# Patient Record
Sex: FEMALE | Race: BLACK OR AFRICAN AMERICAN | NOT HISPANIC OR LATINO | Employment: UNEMPLOYED | URBAN - METROPOLITAN AREA
[De-identification: names, ages, dates, MRNs, and addresses within clinical notes are randomized per-mention and may not be internally consistent; named-entity substitution may affect disease eponyms.]

---

## 2020-09-30 ENCOUNTER — TELEPHONE (OUTPATIENT)
Dept: RHEUMATOLOGY | Facility: CLINIC | Age: 45
End: 2020-09-30

## 2020-10-01 ENCOUNTER — OFFICE VISIT (OUTPATIENT)
Dept: RHEUMATOLOGY | Facility: CLINIC | Age: 45
End: 2020-10-01
Payer: MEDICAID

## 2020-10-01 ENCOUNTER — TELEPHONE (OUTPATIENT)
Dept: RHEUMATOLOGY | Facility: CLINIC | Age: 45
End: 2020-10-01

## 2020-10-01 ENCOUNTER — HOSPITAL ENCOUNTER (OUTPATIENT)
Dept: RADIOLOGY | Facility: HOSPITAL | Age: 45
Discharge: HOME | End: 2020-10-01
Attending: INTERNAL MEDICINE
Payer: MEDICAID

## 2020-10-01 VITALS
WEIGHT: 192.69 LBS | DIASTOLIC BLOOD PRESSURE: 102 MMHG | BODY MASS INDEX: 35.46 KG/M2 | HEART RATE: 71 BPM | SYSTOLIC BLOOD PRESSURE: 150 MMHG | HEIGHT: 62 IN

## 2020-10-01 DIAGNOSIS — M06.4 INFLAMMATORY POLYARTHRITIS: ICD-10-CM

## 2020-10-01 DIAGNOSIS — M06.4 INFLAMMATORY POLYARTHRITIS: Primary | ICD-10-CM

## 2020-10-01 PROCEDURE — 99205 OFFICE O/P NEW HI 60 MIN: CPT | Mod: S$PBB,,, | Performed by: INTERNAL MEDICINE

## 2020-10-01 PROCEDURE — 99999 PR PBB SHADOW E&M-EST. PATIENT-LVL IV: ICD-10-PCS | Mod: PBBFAC,,, | Performed by: INTERNAL MEDICINE

## 2020-10-01 PROCEDURE — 77077 JOINT SURVEY SINGLE VIEW: CPT | Mod: TC

## 2020-10-01 PROCEDURE — 99999 PR PBB SHADOW E&M-EST. PATIENT-LVL IV: CPT | Mod: PBBFAC,,, | Performed by: INTERNAL MEDICINE

## 2020-10-01 PROCEDURE — 99214 OFFICE O/P EST MOD 30 MIN: CPT | Mod: PBBFAC | Performed by: INTERNAL MEDICINE

## 2020-10-01 PROCEDURE — 77077 XR ARTHRITIS SURVEY: ICD-10-PCS | Mod: 26,,, | Performed by: RADIOLOGY

## 2020-10-01 PROCEDURE — 77077 JOINT SURVEY SINGLE VIEW: CPT | Mod: 26,,, | Performed by: RADIOLOGY

## 2020-10-01 PROCEDURE — 99205 PR OFFICE/OUTPT VISIT, NEW, LEVL V, 60-74 MIN: ICD-10-PCS | Mod: S$PBB,,, | Performed by: INTERNAL MEDICINE

## 2020-10-01 RX ORDER — HYDRALAZINE HYDROCHLORIDE 100 MG/1
TABLET, FILM COATED ORAL
COMMUNITY
Start: 2020-09-21 | End: 2021-03-30

## 2020-10-01 RX ORDER — TIZANIDINE 4 MG/1
TABLET ORAL
COMMUNITY
Start: 2020-09-28 | End: 2022-11-10 | Stop reason: ALTCHOICE

## 2020-10-01 RX ORDER — CARVEDILOL 25 MG/1
25 TABLET ORAL 2 TIMES DAILY
COMMUNITY
Start: 2020-09-05 | End: 2022-10-12

## 2020-10-01 RX ORDER — ESCITALOPRAM OXALATE 20 MG/1
TABLET ORAL
COMMUNITY
Start: 2020-08-25 | End: 2022-10-12

## 2020-10-01 RX ORDER — CLONAZEPAM 1 MG/1
TABLET ORAL
COMMUNITY
Start: 2020-08-18

## 2020-10-01 RX ORDER — IBUPROFEN 800 MG/1
TABLET ORAL
COMMUNITY
Start: 2020-09-28 | End: 2020-10-28 | Stop reason: ALTCHOICE

## 2020-10-01 RX ORDER — CHLORHEXIDINE GLUCONATE ORAL RINSE 1.2 MG/ML
SOLUTION DENTAL
COMMUNITY
Start: 2020-09-27 | End: 2021-03-30

## 2020-10-01 RX ORDER — NALOXONE HYDROCHLORIDE 4 MG/.1ML
SPRAY NASAL
COMMUNITY
Start: 2020-08-25 | End: 2021-10-05 | Stop reason: ALTCHOICE

## 2020-10-01 RX ORDER — PREDNISONE 20 MG/1
20 TABLET ORAL DAILY
Qty: 30 TABLET | Refills: 2 | Status: SHIPPED | OUTPATIENT
Start: 2020-10-01 | End: 2020-10-28 | Stop reason: ALTCHOICE

## 2020-10-01 RX ORDER — DICLOFENAC SODIUM 10 MG/G
GEL TOPICAL
COMMUNITY
Start: 2020-06-26 | End: 2021-10-05 | Stop reason: ALTCHOICE

## 2020-10-01 RX ORDER — ASPIRIN 325 MG
TABLET ORAL
COMMUNITY
Start: 2020-09-30

## 2020-10-01 RX ORDER — BUSPIRONE HYDROCHLORIDE 10 MG/1
TABLET ORAL
COMMUNITY
Start: 2020-09-15 | End: 2021-03-30

## 2020-10-01 RX ORDER — LOSARTAN POTASSIUM 100 MG/1
TABLET ORAL
COMMUNITY
Start: 2020-07-06 | End: 2021-03-30

## 2020-10-01 RX ORDER — GABAPENTIN 300 MG/1
CAPSULE ORAL
COMMUNITY
Start: 2020-09-28 | End: 2021-03-30

## 2020-10-01 RX ORDER — CLONIDINE HYDROCHLORIDE 0.2 MG/1
0.4 TABLET ORAL 2 TIMES DAILY
COMMUNITY
Start: 2020-09-10 | End: 2021-03-30

## 2020-10-01 NOTE — PROGRESS NOTES
RHEUMATOLOGY CLINIC INITIAL VISIT    Reason for referral:-  Referred for evaluation of joint pain.    Chief complaints:-  Joint pain.    HPI:-  [facility] 45 y[facility] pleasant female comes in for an initial visit with above chief complaints.  She was at her usual health until 3 months ago when she started having acute onset diffuse joint pain all over the body.  It came out of nowhere without any preceding systemic illness.  No exposure to COVID.  No Raynaud's phenomenon.  No skin rash.  No sicca syndrome.  No personal or family history of skin psoriasis.  No family history of lupus.  The pain has been progressively worsening associated with stiffness and swelling lasting all day long especially in her small joints.  She was checked for lupus and was found to have some lupus antibody.  No further information available on that.  She was given 3 days of prednisone which helped with the pain and the pain returned.  She has been doing physical therapy which has only worsened the pain.  It is not improving her pain.    Review of Systems   Constitutional: Negative for chills and fever.   HENT: Negative for congestion and sore throat.    Eyes: Negative for blurred vision and redness.   Respiratory: Negative for cough and shortness of breath.    Cardiovascular: Negative for chest pain and leg swelling.   Gastrointestinal: Negative for abdominal pain.   Genitourinary: Negative for dysuria.   Musculoskeletal: Positive for joint pain. Negative for back pain, falls, myalgias and neck pain.   Skin: Negative for rash.   Neurological: Negative for headaches.   Endo/Heme/Allergies: Does not bruise/bleed easily.   Psychiatric/Behavioral: Negative for memory loss. The patient does not have insomnia.        Past Medical History:   Diagnosis Date    Hypertension     Stroke            Past Surgical History:   Procedure Laterality Date     SECTION      TUBAL LIGATION          Social History     Tobacco Use     "Smoking status: Never Smoker    Smokeless tobacco: Never Used   Substance Use Topics    Alcohol use: Not Currently    Drug use: Not on file       Family History   Problem Relation Age of Onset    Hypertension Mother     Cancer Father         osiel       Review of patient's allergies indicates:   Allergen Reactions    Oranges [orange] Hives       Vitals:    10/01/20 1029   BP: (!) 150/102   Pulse: 71   Weight: 87.4 kg (192 lb 10.9 oz)   Height: 5' 2" (1.575 m)   PainSc:   9   PainLoc: Generalized       Physical Exam   Constitutional: She is oriented to person, place, and time and well-developed, well-nourished, and in no distress. No distress.   HENT:   Head: Normocephalic.   Mouth/Throat: Oropharynx is clear and moist.   Eyes: Pupils are equal, round, and reactive to light. Conjunctivae and EOM are normal.   Neck: Normal range of motion. Neck supple.   Cardiovascular: Normal rate and intact distal pulses.   Pulmonary/Chest: Effort normal. No respiratory distress.   Abdominal: Soft. There is no abdominal tenderness.   Musculoskeletal:      Comments: Severe synovitis over bilateral PIP is, MCPs, tenderness over bilateral wrists, tenderness around bilateral knees, fullness of bilateral ankles with effusion and positive MTP squeeze test.   Neurological: She is alert and oriented to person, place, and time. No cranial nerve deficit.   Skin: Skin is warm. No rash noted. No erythema.   Psychiatric: Mood and affect normal.   Nursing note and vitals reviewed.        Medication List with Changes/Refills   New Medications    PREDNISONE (DELTASONE) 20 MG TABLET    Take 1 tablet (20 mg total) by mouth once daily.   Current Medications    ASPIRIN 325 MG TABLET        BUSPIRONE (BUSPAR) 10 MG TABLET    TAKE 1 TO 2 TABLETS BY MOUTH ONCE DAILY    CARVEDILOL (COREG) 25 MG TABLET    Take 25 mg by mouth 2 (two) times daily.    CHLORHEXIDINE (PERIDEX) 0.12 % SOLUTION    SWISH AND SPIT 15 ML IN MOUTH TWICE A DAY (NO FOOD 60 " MINUTES AFTER)    CLONAZEPAM (KLONOPIN) 1 MG TABLET    TAKE 1 TABLET BY MOUTH ONCE DAILY AT BEDTIME AS NEEDED FOR ANXIETY SLEEP    CLONIDINE (CATAPRES) 0.2 MG TABLET    Take 0.4 mg by mouth 2 (two) times daily.    DICLOFENAC SODIUM (VOLTAREN) 1 % GEL    AS DIRECTED APPLY APPROXIMATELY 4 GRAMS TO PAINFUL JOINTS TWO TIMES A DAY IF NEEDED FOR 30 DAYS    ESCITALOPRAM OXALATE (LEXAPRO) 20 MG TABLET    TAKE 1 TABLET BY MOUTH ONCE DAILY FOR 30 DAYS    GABAPENTIN (NEURONTIN) 300 MG CAPSULE    TAKE 1 CAPSULE BY MOUTH TWICE DAILY FOR PAIN    HYDRALAZINE (APRESOLINE) 100 MG TABLET    TAKE 1 TABLET BY MOUTH THREE TIMES DAILY FOR 30 DAYS    IBUPROFEN (ADVIL,MOTRIN) 800 MG TABLET    TAKE 1 TABLET BY MOUTH EVERY 6 TO 8 HOURS FOR PAIN    LOSARTAN (COZAAR) 100 MG TABLET    TAKE 1 TABLET BY MOUTH ONCE DAILY FOR 30 DAYS    NARCAN 4 MG/ACTUATION SPRY    CALL 911. ADMINISTER A SINGLE SPRAY INTRANASALLY INTO ONE NOSTRIL UPON SIGNS OF OPIOID OVERDOSE. MAY REPEAT AFTER 3 MINUTES IF NO RESPONSE.    TIZANIDINE (ZANAFLEX) 4 MG TABLET    TAKE 1 TABLET BY MOUTH AS NEEDED THREE TIMES A DAY IF NEEDED FOR PAIN       Assessment/Plans:-  1. Inflammatory polyarthritis    ·  Acute onset chronic inflammatory symmetric polyarthritis.  Differential diagnosis includes rheumatoid arthritis, lupus arthritis, psoriatic arthritis and other connective tissue disease arthritis.  Check labs today to confirm what kind of arthritis she has.  Start prednisone in the interim.  Check x-ray to look for any damage  Problem List Items Addressed This Visit     Inflammatory polyarthritis - Primary    Relevant Medications    predniSONE (DELTASONE) 20 MG tablet    Other Relevant Orders    ED Screen w/Reflex    CBC auto differential    Cyclic Citrullinated Peptide Antibody, IgG    Comprehensive metabolic panel    C-Reactive Protein    Sedimentation rate    Rheumatoid factor    HLA B27 Antigen    Parvovirus B19 Antibody, IgG and IgM    XR Arthritis Survey          Follow up  in about 3 weeks (around 10/22/2020).    Thank you for allowing me to participate in the [facility].    Disclaimer: This note was prepared using voice recognition system and is likely to have sound alike errors and is not proof read.  Please call me with any questions.

## 2020-10-01 NOTE — TELEPHONE ENCOUNTER
Spoke with patient she states that her Potassium is always low. She is currently taking Potassium 20meq twice daily and states that she has been faithfully taking it.    She will contact her PCP Xena Miller [phone] for recommendations

## 2020-10-05 ENCOUNTER — TELEPHONE (OUTPATIENT)
Dept: RHEUMATOLOGY | Facility: CLINIC | Age: 45
End: 2020-10-05

## 2020-10-05 NOTE — TELEPHONE ENCOUNTER
----- Message from Chase Edwards sent at 10/5/2020  9:42 AM CDT -----  Type:  Test Results    Who Called: Pt  Name of Test (Lab/Mammo/Etc): lab   Date of Test:   Ordering Provider: Dr. LÓPEZ   Where the test was performed:   Would the patient rather a call back or a response via [facility]? Call back  Best Call Back Number: [phone] (home)   Additional Information:

## 2020-10-05 NOTE — TELEPHONE ENCOUNTER
Spoke with pt and she was call for her lab results, please advise.     Pt also states that she is feeling a little better that at her visit.

## 2020-10-09 ENCOUNTER — TELEPHONE (OUTPATIENT)
Dept: RHEUMATOLOGY | Facility: CLINIC | Age: 45
End: 2020-10-09

## 2020-10-09 DIAGNOSIS — M06.4 INFLAMMATORY POLYARTHRITIS: Primary | ICD-10-CM

## 2020-10-09 NOTE — TELEPHONE ENCOUNTER
Spoke with pt and she states that she has been having pain the last few days. States that it moves around throughout her body and is a throbbing pain. Rates it at a 9 on a scale of 0 to 10. States that she couldn't go to therapy yesterday because of the pain and she couldn't sleep last night because of it. Has tried aleve, bc powder, ibuprofen, etc and nothing has helped. Advised pt that Dr. LÓPEZ is out of the clinic on Fridays but we could check with, come for a visit or do a virtual visit with another provider in clinic today and pt declined stating that she only wants to see Dr. LÓPEZ. Advised patient that he will be back in clinic on Monday and once he reviews her message we will get back with her. Pt verbalized understanding

## 2020-10-09 NOTE — TELEPHONE ENCOUNTER
----- Message from Jennifer Alberto sent at 10/9/2020  7:41 AM CDT -----  Contact: self  Type:  Needs Medical Advice    Who Called: pt  Symptoms (please be specific): pains through out body  How long has patient had these symptoms: 2 days  Pharmacy name and phone #:   [facility] [place], [place] - [address]  [address] [address]  Phone: [phone] Fax: [phone]  Would the patient rather a call back or a response via [facility]? Call back   Best Call Back Number: [phone]  Additional Information: none

## 2020-10-12 ENCOUNTER — LAB VISIT (OUTPATIENT)
Dept: LAB | Facility: HOSPITAL | Age: 45
End: 2020-10-12
Attending: INTERNAL MEDICINE
Payer: MEDICAID

## 2020-10-12 DIAGNOSIS — M06.4 INFLAMMATORY POLYARTHRITIS: ICD-10-CM

## 2020-10-12 LAB
ALBUMIN SERPL BCP-MCNC: 3.5 G/DL (ref 3.5–5.2)
ALP SERPL-CCNC: 70 U/L (ref 55–135)
ALT SERPL W/O P-5'-P-CCNC: 19 U/L (ref 10–44)
ANION GAP SERPL CALC-SCNC: 12 MMOL/L (ref 8–16)
ANISOCYTOSIS BLD QL SMEAR: SLIGHT
AST SERPL-CCNC: 13 U/L (ref 10–40)
BASOPHILS # BLD AUTO: 0.03 K/UL (ref 0–0.2)
BASOPHILS NFR BLD: 0.3 % (ref 0–1.9)
BILIRUB SERPL-MCNC: 0.3 MG/DL (ref 0.1–1)
BUN SERPL-MCNC: 19 MG/DL (ref 6–20)
CALCIUM SERPL-MCNC: 9.1 MG/DL (ref 8.7–10.5)
CHLORIDE SERPL-SCNC: 104 MMOL/L (ref 95–110)
CO2 SERPL-SCNC: 25 MMOL/L (ref 23–29)
CREAT SERPL-MCNC: 0.9 MG/DL (ref 0.5–1.4)
CRP SERPL-MCNC: 3.2 MG/L (ref 0–8.2)
DIFFERENTIAL METHOD: ABNORMAL
EOSINOPHIL # BLD AUTO: 0 K/UL (ref 0–0.5)
EOSINOPHIL NFR BLD: 0.1 % (ref 0–8)
ERYTHROCYTE [DISTWIDTH] IN BLOOD BY AUTOMATED COUNT: 15.9 % (ref 11.5–14.5)
EST. GFR  (AFRICAN AMERICAN): >60 ML/MIN/1.73 M^2
EST. GFR  (NON AFRICAN AMERICAN): >60 ML/MIN/1.73 M^2
GLUCOSE SERPL-MCNC: 108 MG/DL (ref 70–110)
HCT VFR BLD AUTO: 34.7 % (ref 37–48.5)
HGB BLD-MCNC: 10.7 G/DL (ref 12–16)
IMM GRANULOCYTES # BLD AUTO: 0.22 K/UL (ref 0–0.04)
IMM GRANULOCYTES NFR BLD AUTO: 2 % (ref 0–0.5)
LYMPHOCYTES # BLD AUTO: 1.9 K/UL (ref 1–4.8)
LYMPHOCYTES NFR BLD: 17.4 % (ref 18–48)
MCH RBC QN AUTO: 28.7 PG (ref 27–31)
MCHC RBC AUTO-ENTMCNC: 30.8 G/DL (ref 32–36)
MCV RBC AUTO: 93 FL (ref 82–98)
MONOCYTES # BLD AUTO: 0.9 K/UL (ref 0.3–1)
MONOCYTES NFR BLD: 8.4 % (ref 4–15)
NEUTROPHILS # BLD AUTO: 8.2 K/UL (ref 1.8–7.7)
NEUTROPHILS NFR BLD: 73.8 % (ref 38–73)
NRBC BLD-RTO: 0 /100 WBC
PLATELET # BLD AUTO: 326 K/UL (ref 150–350)
PLATELET BLD QL SMEAR: ABNORMAL
PMV BLD AUTO: 11.5 FL (ref 9.2–12.9)
POLYCHROMASIA BLD QL SMEAR: ABNORMAL
POTASSIUM SERPL-SCNC: 3.2 MMOL/L (ref 3.5–5.1)
PROT SERPL-MCNC: 7.4 G/DL (ref 6–8.4)
RBC # BLD AUTO: 3.73 M/UL (ref 4–5.4)
SODIUM SERPL-SCNC: 141 MMOL/L (ref 136–145)
WBC # BLD AUTO: 11.14 K/UL (ref 3.9–12.7)

## 2020-10-12 PROCEDURE — 85025 COMPLETE CBC W/AUTO DIFF WBC: CPT

## 2020-10-12 PROCEDURE — 86704 HEP B CORE ANTIBODY TOTAL: CPT

## 2020-10-12 PROCEDURE — 80053 COMPREHEN METABOLIC PANEL: CPT

## 2020-10-12 PROCEDURE — 87340 HEPATITIS B SURFACE AG IA: CPT

## 2020-10-12 PROCEDURE — 36415 COLL VENOUS BLD VENIPUNCTURE: CPT

## 2020-10-12 PROCEDURE — 85652 RBC SED RATE AUTOMATED: CPT

## 2020-10-12 PROCEDURE — 86803 HEPATITIS C AB TEST: CPT

## 2020-10-12 PROCEDURE — 86140 C-REACTIVE PROTEIN: CPT

## 2020-10-12 PROCEDURE — 86706 HEP B SURFACE ANTIBODY: CPT

## 2020-10-12 NOTE — TELEPHONE ENCOUNTER
Patient called and advised about the lab results. Please schedule ESR/CRP ALONG WITH OTHER LABS ordered today at the earliest.

## 2020-10-13 ENCOUNTER — TELEPHONE (OUTPATIENT)
Dept: RHEUMATOLOGY | Facility: CLINIC | Age: 45
End: 2020-10-13

## 2020-10-13 LAB
ERYTHROCYTE [SEDIMENTATION RATE] IN BLOOD BY WESTERGREN METHOD: 46 MM/HR (ref 0–36)
HBV CORE AB SERPL QL IA: NEGATIVE
HBV SURFACE AB SER-ACNC: POSITIVE M[IU]/ML
HBV SURFACE AG SERPL QL IA: NEGATIVE
HCV AB SERPL QL IA: NEGATIVE

## 2020-10-13 NOTE — PROGRESS NOTES
Test results show significant improvement of inflammation.  Advice to continue prednisone until follow-up visit.

## 2020-10-13 NOTE — TELEPHONE ENCOUNTER
----- Message from Ruperto Noguera MD sent at 10/13/2020  8:44 AM CDT -----  Test results show significant improvement of inflammation.  Advice to continue prednisone until follow-up visit.

## 2020-10-21 ENCOUNTER — TELEPHONE (OUTPATIENT)
Dept: RHEUMATOLOGY | Facility: CLINIC | Age: 45
End: 2020-10-21

## 2020-10-21 NOTE — TELEPHONE ENCOUNTER
----- Message from Pat Nelson sent at 10/21/2020 12:02 PM CDT -----  Regarding: pain  Contact: patient  Patient states that she is in a lot of pain and swollen and would like to be advised, please call her back at [phone]

## 2020-10-21 NOTE — TELEPHONE ENCOUNTER
Spoke with pt and scheduled apt for labs on 10.22.20 at the [place]. Pt verbalized understanding

## 2020-10-21 NOTE — TELEPHONE ENCOUNTER
Spoke with pt and about 1 week ago she being to have swelling all over and pain in her legs, hands and shoulders. States that it is a constant achy pain that she rates at a 9 on a scale of 0 to 10. Is currently taking 20 mg prednisone daily. Would like to know what else she can do. Please Advise.

## 2020-10-22 ENCOUNTER — LAB VISIT (OUTPATIENT)
Dept: LAB | Facility: HOSPITAL | Age: 45
End: 2020-10-22
Attending: INTERNAL MEDICINE
Payer: MEDICAID

## 2020-10-22 DIAGNOSIS — M06.4 INFLAMMATORY POLYARTHRITIS: ICD-10-CM

## 2020-10-22 LAB
CRP SERPL-MCNC: 7.2 MG/L (ref 0–8.2)
ERYTHROCYTE [SEDIMENTATION RATE] IN BLOOD BY WESTERGREN METHOD: 69 MM/HR (ref 0–36)

## 2020-10-22 PROCEDURE — 85652 RBC SED RATE AUTOMATED: CPT

## 2020-10-22 PROCEDURE — 36415 COLL VENOUS BLD VENIPUNCTURE: CPT

## 2020-10-22 PROCEDURE — 86140 C-REACTIVE PROTEIN: CPT

## 2020-10-26 ENCOUNTER — TELEPHONE (OUTPATIENT)
Dept: RHEUMATOLOGY | Facility: CLINIC | Age: 45
End: 2020-10-26

## 2020-10-26 NOTE — TELEPHONE ENCOUNTER
----- Message from Ruperto Noguera MD sent at 10/25/2020 10:39 PM CDT -----  Advice to increase prednisone to 40 mg daily until the clinic visit. Thanks.

## 2020-10-28 ENCOUNTER — PATIENT MESSAGE (OUTPATIENT)
Dept: RHEUMATOLOGY | Facility: CLINIC | Age: 45
End: 2020-10-28

## 2020-10-28 ENCOUNTER — OFFICE VISIT (OUTPATIENT)
Dept: RHEUMATOLOGY | Facility: CLINIC | Age: 45
End: 2020-10-28
Payer: MEDICAID

## 2020-10-28 ENCOUNTER — TELEPHONE (OUTPATIENT)
Dept: RHEUMATOLOGY | Facility: CLINIC | Age: 45
End: 2020-10-28

## 2020-10-28 DIAGNOSIS — R06.02 SHORTNESS OF BREATH: ICD-10-CM

## 2020-10-28 DIAGNOSIS — R06.09 DOE (DYSPNEA ON EXERTION): ICD-10-CM

## 2020-10-28 DIAGNOSIS — M06.4 INFLAMMATORY POLYARTHRITIS: Primary | ICD-10-CM

## 2020-10-28 PROCEDURE — 99215 OFFICE O/P EST HI 40 MIN: CPT | Mod: 95,,, | Performed by: INTERNAL MEDICINE

## 2020-10-28 PROCEDURE — 99215 PR OFFICE/OUTPT VISIT, EST, LEVL V, 40-54 MIN: ICD-10-PCS | Mod: 95,,, | Performed by: INTERNAL MEDICINE

## 2020-10-28 NOTE — TELEPHONE ENCOUNTER
Spoke with pt to offer virtual visit with Dr. LÓPEZ for 10.28.20 at 4.45 pm due to the weather, pt accepted virtual visit. Text link sent via epic to set up [employer] account, will send message with instructions on how to complete virtual visit once account is set up and change visit over. Pt verbalized understanding.

## 2020-10-28 NOTE — PROGRESS NOTES
RHEUMATOLOGY FOLLOW UP - TELE VISIT     The patient location is: [place]  The chief complaint leading to consultation is:  Worsening joint pain, diffuse body pain, diffuse swelling  Visit type: Virtual visit with synchronous audio and video  Total time spent with patient: 25 miinutes  Each patient to whom he or she provides medical services by telemedicine is:  (1) informed of the relationship between the physician and patient and the respective role of any other health care provider with respect to management of the patient; and (2) notified that he or she may decline to receive medical services by telemedicine and may withdraw from such care at any time.    HPI:-  Denise Leburnelaine a 45 y.o. pleasant female seen today through My chart video visit for follow up.  She has a very complex presentation of acute onset chronic inflammatory arthritis.  When I saw her in 1st week of October she manifested with inflammatory arthritis and high inflammatory markers.  She was diagnosed with seronegative rheumatoid arthritis and was started on prednisone therapy.  The 20 mg prednisone significantly improved her symptoms for a week.  When she complained of pain returning and worsening inflammation, repeat inflammatory markers showed normalization of C-reactive protein but high sedimentation rate.  At that time she was advised by her primary care physician to start taking another 10 mg prednisone at bedtime.  She was taking 20 mg in mornings and 10 mg at night and after a week when she complained of worsening pain repeat inflammatory markers were drawn which showed high sedimentation rate back to her prior value but normal CRP.  In this time she denies any new onset skin rash other than mild redness under left elbow.  No symptoms like temporal arteritis, abdominal pain after eating, chest pain with exertion, neuropathy in her feet or purpura.  In the last to be due to worsening symptom her prednisone dose was increased to 40 mg  but she did not notice any change in her symptoms and have noticed worsening swelling all over the body associated with dyspnea on exertion.  She was seen by her primary care physician and was found to have fluid around her lungs and was put on diuretics.  She has history of stroke back in 2010 and chronic uncontrolled hypertension but no history of heart failure.    Review of Systems   Constitutional: Positive for malaise/fatigue. Negative for chills and fever.   HENT: Negative for congestion and sore throat.    Eyes: Negative for blurred vision and redness.   Respiratory: Positive for shortness of breath. Negative for cough.    Cardiovascular: Positive for leg swelling. Negative for chest pain.   Gastrointestinal: Negative for abdominal pain.   Genitourinary: Negative for dysuria.   Musculoskeletal: Positive for back pain, joint pain, myalgias and neck pain. Negative for falls.   Skin: Positive for rash.   Neurological: Negative for headaches.   Endo/Heme/Allergies: Does not bruise/bleed easily.   Psychiatric/Behavioral: Negative for memory loss. The patient is nervous/anxious. The patient does not have insomnia.        Past Medical History:   Diagnosis Date    Hypertension     Stroke            Past Surgical History:   Procedure Laterality Date     SECTION      TUBAL LIGATION          Social History     Tobacco Use    Smoking status: Never Smoker    Smokeless tobacco: Never Used   Substance Use Topics    Alcohol use: Not Currently    Drug use: Not on file       Family History   Problem Relation Age of Onset    Hypertension Mother     Cancer Father         esophegal       Review of patient's allergies indicates:   Allergen Reactions    Oranges [orange] Hives       Physical exam:-    GEN: awake, alert, non-diaphoretic, no psychomotor agitation, no acute distress    HEENT :Head: atraumatic, normocephalic, no rashes noted, no lesions noted;    Eyes:  Mild periorbital swelling.    Nose: NO  redness, swelling, discharge, deformity, or impetigo/crusting    Skin:  Erythematous rash under left elbow but no scaling.    Cardiopulmonary: no increased respiratory effort, speaking in clear sentences    MSK: normal ROM in the neck, Upper extremities, Lower extremities  Good ROM of hands, fist formation 80% with diffuse swelling of hands and feet  Good ambulation in front of the camera    Neuro: cranial nerves grossly normal, speech normal rate and rhythm, orientation arrived to appointment on time with no prompting, moved both upper extremities equally    Pysch:  appearance, behavior, and attitude- well groomed, pleasant, cooperative    Medication List with Changes/Refills   Current Medications    ASPIRIN 325 MG TABLET        BUSPIRONE (BUSPAR) 10 MG TABLET    TAKE 1 TO 2 TABLETS BY MOUTH ONCE DAILY    CARVEDILOL (COREG) 25 MG TABLET    Take 25 mg by mouth 2 (two) times daily.    CHLORHEXIDINE (PERIDEX) 0.12 % SOLUTION    SWISH AND SPIT 15 ML IN MOUTH TWICE A DAY (NO FOOD 60 MINUTES AFTER)    CLONAZEPAM (KLONOPIN) 1 MG TABLET    TAKE 1 TABLET BY MOUTH ONCE DAILY AT BEDTIME AS NEEDED FOR ANXIETY SLEEP    CLONIDINE (CATAPRES) 0.2 MG TABLET    Take 0.4 mg by mouth 2 (two) times daily.    DICLOFENAC SODIUM (VOLTAREN) 1 % GEL    AS DIRECTED APPLY APPROXIMATELY 4 GRAMS TO PAINFUL JOINTS TWO TIMES A DAY IF NEEDED FOR 30 DAYS    ESCITALOPRAM OXALATE (LEXAPRO) 20 MG TABLET    TAKE 1 TABLET BY MOUTH ONCE DAILY FOR 30 DAYS    GABAPENTIN (NEURONTIN) 300 MG CAPSULE    TAKE 1 CAPSULE BY MOUTH TWICE DAILY FOR PAIN    HYDRALAZINE (APRESOLINE) 100 MG TABLET    TAKE 1 TABLET BY MOUTH THREE TIMES DAILY FOR 30 DAYS    LOSARTAN (COZAAR) 100 MG TABLET    TAKE 1 TABLET BY MOUTH ONCE DAILY FOR 30 DAYS    NARCAN 4 MG/ACTUATION SPRY    CALL 911. ADMINISTER A SINGLE SPRAY INTRANASALLY INTO ONE NOSTRIL UPON SIGNS OF OPIOID OVERDOSE. MAY REPEAT AFTER 3 MINUTES IF NO RESPONSE.    TIZANIDINE (ZANAFLEX) 4 MG TABLET    TAKE 1 TABLET BY MOUTH AS  NEEDED THREE TIMES A DAY IF NEEDED FOR PAIN   Discontinued Medications    IBUPROFEN (ADVIL,MOTRIN) 800 MG TABLET    TAKE 1 TABLET BY MOUTH EVERY 6 TO 8 HOURS FOR PAIN    PREDNISONE (DELTASONE) 20 MG TABLET    Take 1 tablet (20 mg total) by mouth once daily.       Assessment/Plans:-  1. Inflammatory polyarthritis    2. ALSTON (dyspnea on exertion)    3. Shortness of breath    ·  Extremely complex presentation with significantly elevated inflammatory markers, diffuse inflammatory polyarthritis not responding to high-dose prednisone therapy -40 mg daily causing worsening swelling, volume overload and worsening dyspnea on exertion.  · Her CRP normalized after prednisone but her ESR failed to improve.  · Her pain improved for a week on 20 mg prednisone but when it worsened the pain failed to improve even after increasing the dose of prednisone to 40 mg  · Worsening diffuse swelling, volume overload, need for diuretics raises possibility of a more systemic inflammatory process.  · No purpura or neuropathy to suspect small or medium vessel vasculitis.  · No headaches or chest pain to suspect large vessel vasculitis  · No skin psoriasis to suspect psoriatic arthritis  · High titer positive ED but negative DRE, no photosensitive rash, sicca syndrome, Raynaud's phenomenon to suspect systemic lupus  · No history of exposure to COVID-19 to suspect unusual postviral autoimmune inflammatory process.  · Her diagnosis is still unclear.  · Expand the antibody panel for vasculitis antibodies, antiphospholipid antibodies.  · Discontinue prednisone.  · Chest x-ray before starting methotrexate for seronegative rheumatoid arthritis/ psoriatic arthritis  · TB QuantiFERON testing before starting TNF alpha inhibitors or other biologic if any other medical comorbidities detected which might contraindicate TNF alpha inhibitor therapy  Problem List Items Addressed This Visit     Inflammatory polyarthritis - Primary    Relevant Orders    B-TYPE  NATRIURETIC PEPTIDE    Cardiolipin antibody    Beta-2 Glycoprotein Abs (IgA, IgG, IgM)    DRVVT    Sedimentation rate    C-Reactive Protein    Anti-Neutrophilic Cytoplasmic Antibody    Proteinase 3 Autoantibodies    Myeloperoxidase Antibody (MPO)    X-Ray Chest PA And Lateral    Quantiferon Gold TB    CK    Aldolase    ALSTON (dyspnea on exertion)    Relevant Orders    CT Chest Without Contrast      Other Visit Diagnoses     Shortness of breath        Relevant Orders    CT Chest Without Contrast          Follow up in about 4 weeks (around 11/25/2020).    Disclaimer: This note was prepared using voice recognition system and is likely to have sound alike errors and is not proof read.  Please call me with any questions.

## 2020-10-29 ENCOUNTER — HOSPITAL ENCOUNTER (OUTPATIENT)
Dept: RADIOLOGY | Facility: HOSPITAL | Age: 45
Discharge: HOME | End: 2020-10-29
Attending: INTERNAL MEDICINE
Payer: MEDICAID

## 2020-10-29 DIAGNOSIS — M06.4 INFLAMMATORY POLYARTHRITIS: ICD-10-CM

## 2020-10-29 PROCEDURE — 71046 X-RAY EXAM CHEST 2 VIEWS: CPT | Mod: 26,,, | Performed by: RADIOLOGY

## 2020-10-29 PROCEDURE — 71046 XR CHEST PA AND LATERAL: ICD-10-PCS | Mod: 26,,, | Performed by: RADIOLOGY

## 2020-10-29 PROCEDURE — 71046 X-RAY EXAM CHEST 2 VIEWS: CPT | Mod: TC

## 2020-11-02 ENCOUNTER — PATIENT MESSAGE (OUTPATIENT)
Dept: RHEUMATOLOGY | Facility: CLINIC | Age: 45
End: 2020-11-02

## 2020-11-04 ENCOUNTER — PATIENT MESSAGE (OUTPATIENT)
Dept: RHEUMATOLOGY | Facility: CLINIC | Age: 45
End: 2020-11-04

## 2020-11-04 NOTE — PROGRESS NOTES
Called and left voicemail to both her PCP Ms. Miller and Cardiologist Dr. Rodrigues to discuss about trial of discontinuing Hydralazine which might be contributing to her positive ED and positive MPO antibody.

## 2020-11-05 ENCOUNTER — TELEPHONE (OUTPATIENT)
Dept: RADIOLOGY | Facility: HOSPITAL | Age: 45
End: 2020-11-05

## 2020-11-06 ENCOUNTER — HOSPITAL ENCOUNTER (OUTPATIENT)
Dept: RADIOLOGY | Facility: HOSPITAL | Age: 45
Discharge: HOME | End: 2020-11-06
Attending: INTERNAL MEDICINE
Payer: MEDICAID

## 2020-11-06 DIAGNOSIS — R06.09 DOE (DYSPNEA ON EXERTION): ICD-10-CM

## 2020-11-06 DIAGNOSIS — R06.02 SHORTNESS OF BREATH: ICD-10-CM

## 2020-11-06 PROCEDURE — 71250 CT THORAX DX C-: CPT | Mod: 26,,, | Performed by: RADIOLOGY

## 2020-11-06 PROCEDURE — 71250 CT THORAX DX C-: CPT | Mod: TC

## 2020-11-06 PROCEDURE — 71250 CT CHEST WITHOUT CONTRAST: ICD-10-PCS | Mod: 26,,, | Performed by: RADIOLOGY

## 2020-11-11 ENCOUNTER — TELEPHONE (OUTPATIENT)
Dept: RHEUMATOLOGY | Facility: CLINIC | Age: 45
End: 2020-11-11

## 2020-11-11 DIAGNOSIS — M31.7 MICROSCOPIC POLYANGIITIS: ICD-10-CM

## 2020-11-11 DIAGNOSIS — M06.09 RHEUMATOID ARTHRITIS OF MULTIPLE SITES WITH NEGATIVE RHEUMATOID FACTOR: Primary | ICD-10-CM

## 2020-11-11 RX ORDER — FOLIC ACID 1 MG/1
1 TABLET ORAL DAILY
Qty: 90 TABLET | Refills: 0 | Status: SHIPPED | OUTPATIENT
Start: 2020-11-11 | End: 2021-03-01

## 2020-11-11 RX ORDER — METHOTREXATE 2.5 MG/1
TABLET ORAL
Qty: 84 TABLET | Refills: 0 | Status: SHIPPED | OUTPATIENT
Start: 2020-11-11 | End: 2020-12-22

## 2020-11-11 RX ORDER — PREDNISONE 20 MG/1
20 TABLET ORAL DAILY
Qty: 30 TABLET | Refills: 3 | Status: SHIPPED | OUTPATIENT
Start: 2020-11-11 | End: 2020-12-22

## 2020-11-11 NOTE — TELEPHONE ENCOUNTER
----- Message from Nikki Thompson sent at 11/11/2020 11:54 AM CST -----  Contact: [phone]/SELF  Type:  Needs Medical Advice    Who Called: Patient  Symptoms (please be specific):  HIGH BP  How long has patient had these symptoms:    Pharmacy name and phone #:    [facility] [address] [place], [place] - [address]  [address] [address]  Phone: [phone] Fax: [phone]      Would the patient rather a call back or a response via [facility]? Call back  Best Call Back Number: [phone]  Additional Information: Patients BP is 202/187 last night and would like to speak with nurse        Dionna/IFEANYI

## 2020-11-11 NOTE — TELEPHONE ENCOUNTER
Spoke with pt and her blood pressure is elevated. States that the last few nights her blood pressure is has been very high. States that last night it was 202/187. States that it has been running around 169/100 since stopping the hydralazine on 10.28.2020. Just took her blood pressure and it is 148/99 and that is after taking the losartan, clonidine 0.2 mg and aspirin. States that in the evenings it becomes very high.       Pt states that her pain has not improved since stopping the hydralazine. Advised pt that Dr. LÓPEZ called Dr. Meza office and left a message with his nurse to have Dr. Rodrigues call him back but he said not heard back yet. Would like pt to call his office to 1. Tell them about her blood pressure and 2. Let them know that Dr. LÓPEZ is trying to reach Dr. Rodrigues regarding her care. Pt states that she will call them later today.

## 2020-11-11 NOTE — TELEPHONE ENCOUNTER
Seronegative RA/ Arthritis secondary to Microscopic polyangiitis . Start methotrexate with prednisone. I called and advised patient. Answered all questions.

## 2020-11-12 ENCOUNTER — PATIENT MESSAGE (OUTPATIENT)
Dept: RHEUMATOLOGY | Facility: CLINIC | Age: 45
End: 2020-11-12

## 2020-11-12 NOTE — TELEPHONE ENCOUNTER
Called and advised to patient the importance of discontinuing hydralazine and swithcing to an alternative anti HYPERTENSIVE AGENT. Her cardiologist have not returned my call after several attempts.

## 2020-12-14 ENCOUNTER — TELEPHONE (OUTPATIENT)
Dept: RHEUMATOLOGY | Facility: CLINIC | Age: 45
End: 2020-12-14

## 2020-12-16 ENCOUNTER — PATIENT MESSAGE (OUTPATIENT)
Dept: RHEUMATOLOGY | Facility: CLINIC | Age: 45
End: 2020-12-16

## 2020-12-16 ENCOUNTER — TELEPHONE (OUTPATIENT)
Dept: RHEUMATOLOGY | Facility: CLINIC | Age: 45
End: 2020-12-16

## 2020-12-16 NOTE — TELEPHONE ENCOUNTER
Spoke with Ms. Flower and rescheduled her missed appt with Dr. Noguera for 12/22/20. Virtual Visit instructions provided.

## 2020-12-16 NOTE — TELEPHONE ENCOUNTER
----- Message from Veronica De Oliveira sent at 12/16/2020  4:30 PM CST -----  Regarding: her virtual visit  Contact: pt  Caller is requesting a call back regarding her virtual visit.  Please call back at [phone] .  Thanks.

## 2020-12-21 ENCOUNTER — TELEPHONE (OUTPATIENT)
Dept: RHEUMATOLOGY | Facility: CLINIC | Age: 45
End: 2020-12-21

## 2020-12-22 ENCOUNTER — OFFICE VISIT (OUTPATIENT)
Dept: RHEUMATOLOGY | Facility: CLINIC | Age: 45
End: 2020-12-22
Payer: MEDICAID

## 2020-12-22 DIAGNOSIS — M06.09 RHEUMATOID ARTHRITIS OF MULTIPLE SITES WITH NEGATIVE RHEUMATOID FACTOR: ICD-10-CM

## 2020-12-22 DIAGNOSIS — M31.7 MICROSCOPIC POLYANGIITIS: Primary | ICD-10-CM

## 2020-12-22 DIAGNOSIS — D84.9 IMMUNOCOMPROMISED: ICD-10-CM

## 2020-12-22 DIAGNOSIS — Z79.899 LONG TERM CURRENT USE OF IMMUNOSUPPRESSIVE DRUG: ICD-10-CM

## 2020-12-22 DIAGNOSIS — Z22.7 TB LUNG, LATENT: ICD-10-CM

## 2020-12-22 PROCEDURE — 99215 PR OFFICE/OUTPT VISIT, EST, LEVL V, 40-54 MIN: ICD-10-PCS | Mod: 95,,, | Performed by: INTERNAL MEDICINE

## 2020-12-22 PROCEDURE — 99215 OFFICE O/P EST HI 40 MIN: CPT | Mod: 95,,, | Performed by: INTERNAL MEDICINE

## 2020-12-22 RX ORDER — METHOTREXATE 2.5 MG/1
20 TABLET ORAL
Qty: 32 TABLET | Refills: 3 | Status: SHIPPED | OUTPATIENT
Start: 2020-12-22 | End: 2021-05-26

## 2020-12-22 RX ORDER — PREDNISONE 10 MG/1
10 TABLET ORAL DAILY
Qty: 30 TABLET | Refills: 3 | Status: SHIPPED | OUTPATIENT
Start: 2020-12-22 | End: 2021-04-20

## 2020-12-22 NOTE — PROGRESS NOTES
RHEUMATOLOGY FOLLOW UP - TELE VISIT     The patient location is: [place]  The chief complaint leading to consultation is:  WORSENING HAND PAIN.  Visit type: Virtual visit with synchronous audio and video  Total time spent with patient: 15 minutes  Each patient to whom he or she provides medical services by telemedicine is:  (1) informed of the relationship between the physician and patient and the respective role of any other health care provider with respect to management of the patient; and (2) notified that he or she may decline to receive medical services by telemedicine and may withdraw from such care at any time.    HPI:-  Denise JoPattersonelaine a 45 y.o. pleasant female seen today through My chart video visit for follow up.  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  She reports severe flare of her arthritis since running out of prednisone 3 days ago.  She has been taking methotrexate without any adverse effects.  No abdominal pain.  No blood in stool.  No nasal ulcers.  No recurrent sinusitis.  TB QuantiFERON positive but no cough, night sweats, fever.  Worsening pain over bilateral hands and over feet associated with swelling and stiffness.    Review of Systems   Constitutional: Negative for chills and fever.   HENT: Negative for congestion and sore throat.    Eyes: Negative for blurred vision and redness.   Respiratory: Negative for cough and shortness of breath.    Cardiovascular: Negative for chest pain and leg swelling.   Gastrointestinal: Negative for abdominal pain.   Genitourinary: Negative for dysuria.   Musculoskeletal: Positive for joint pain. Negative for back pain, falls, myalgias and neck pain.   Skin: Negative for rash.   Neurological: Negative for headaches.   Endo/Heme/Allergies: Does not bruise/bleed easily.   Psychiatric/Behavioral: Negative for memory loss. The patient does not have  insomnia.        Past Medical History:   Diagnosis Date    Hypertension     Stroke            Past Surgical History:   Procedure Laterality Date     SECTION      TUBAL LIGATION          Social History     Tobacco Use    Smoking status: Never Smoker    Smokeless tobacco: Never Used   Substance Use Topics    Alcohol use: Not Currently    Drug use: Not on file       Family History   Problem Relation Age of Onset    Hypertension Mother     Cancer Father         esophegal       Review of patient's allergies indicates:   Allergen Reactions    Oranges [orange] Hives       Physical exam:-    GEN: awake, alert, non-diaphoretic, no psychomotor agitation, no acute distress    HEENT :Head: atraumatic, normocephalic, no rashes noted, no lesions noted;    Eyes: NO redness, discharge, swelling, or lesions    Nose: NO redness, swelling, discharge, deformity, or impetigo/crusting    Skin: no lesions, wounds, erythema, or cyanosis noted on face or hands    Cardiopulmonary: no increased respiratory effort, speaking in clear sentences    MSK: normal ROM in the neck, Upper extremities, Lower extremities  Good ROM of hands, fist formation 80% with diffuse synovitis-possible dactylitis over bilateral hands.    Good ambulation in front of the camera    Neuro: cranial nerves grossly normal, speech normal rate and rhythm, orientation arrived to appointment on time with no prompting, moved both upper extremities equally    Pysch:  appearance, behavior, and attitude- well groomed, pleasant, cooperative    Medication List with Changes/Refills   New Medications    PREDNISONE (DELTASONE) 10 MG TABLET    Take 1 tablet (10 mg total) by mouth once daily.   Current Medications    ASPIRIN 325 MG TABLET        BUSPIRONE (BUSPAR) 10 MG TABLET    TAKE 1 TO 2 TABLETS BY MOUTH ONCE DAILY    CARVEDILOL (COREG) 25 MG TABLET    Take 25 mg by mouth 2 (two) times daily.    CHLORHEXIDINE (PERIDEX) 0.12 % SOLUTION    SWISH AND SPIT 15 ML IN  MOUTH TWICE A DAY (NO FOOD 60 MINUTES AFTER)    CLONAZEPAM (KLONOPIN) 1 MG TABLET    TAKE 1 TABLET BY MOUTH ONCE DAILY AT BEDTIME AS NEEDED FOR ANXIETY SLEEP    CLONIDINE (CATAPRES) 0.2 MG TABLET    Take 0.4 mg by mouth 2 (two) times daily.    DICLOFENAC SODIUM (VOLTAREN) 1 % GEL    AS DIRECTED APPLY APPROXIMATELY 4 GRAMS TO PAINFUL JOINTS TWO TIMES A DAY IF NEEDED FOR 30 DAYS    ESCITALOPRAM OXALATE (LEXAPRO) 20 MG TABLET    TAKE 1 TABLET BY MOUTH ONCE DAILY FOR 30 DAYS    FOLIC ACID (FOLVITE) 1 MG TABLET    Take 1 tablet (1 mg total) by mouth once daily.    GABAPENTIN (NEURONTIN) 300 MG CAPSULE    TAKE 1 CAPSULE BY MOUTH TWICE DAILY FOR PAIN    HYDRALAZINE (APRESOLINE) 100 MG TABLET    TAKE 1 TABLET BY MOUTH THREE TIMES DAILY FOR 30 DAYS    LOSARTAN (COZAAR) 100 MG TABLET    TAKE 1 TABLET BY MOUTH ONCE DAILY FOR 30 DAYS    NARCAN 4 MG/ACTUATION SPRY    CALL 911. ADMINISTER A SINGLE SPRAY INTRANASALLY INTO ONE NOSTRIL UPON SIGNS OF OPIOID OVERDOSE. MAY REPEAT AFTER 3 MINUTES IF NO RESPONSE.    TIZANIDINE (ZANAFLEX) 4 MG TABLET    TAKE 1 TABLET BY MOUTH AS NEEDED THREE TIMES A DAY IF NEEDED FOR PAIN   Changed and/or Refilled Medications    Modified Medication Previous Medication    METHOTREXATE 2.5 MG TAB methotrexate 2.5 MG Tab       Take 8 tablets (20 mg total) by mouth every 7 days.    Take 4 tablets (10 mg total) by mouth every 7 days for 14 days, THEN 6 tablets (15 mg total) every 7 days for 14 days, THEN 8 tablets (20 mg total) every 7 days.   Discontinued Medications    PREDNISONE (DELTASONE) 20 MG TABLET    Take 1 tablet (20 mg total) by mouth once daily.       Assessment/Plans:-  1. Microscopic polyangiitis    2. Rheumatoid arthritis of multiple sites with negative rheumatoid factor    3. Long term current use of immunosuppressive drug    4. Immunocompromised    Hydralazine induced Microscopic polyangiitis with polyarthritis on methotrexate.  It has been 1 month since discontinuing hydralazine.  Active.  Increase methotrexate to 20 mg weekly and prednisone 10 mg daily.   Latent TB- Will consult ID .  HOLD METHOTREXATE IF any infection  Safety labs as soon as possible  UA and urine protein creatinine ratio ordered today.  Problem List Items Addressed This Visit     Rheumatoid arthritis of multiple sites with negative rheumatoid factor    Relevant Medications    methotrexate 2.5 MG Tab    predniSONE (DELTASONE) 10 MG tablet    Microscopic polyangiitis - Primary    Relevant Medications    methotrexate 2.5 MG Tab    predniSONE (DELTASONE) 10 MG tablet      Other Visit Diagnoses     Long term current use of immunosuppressive drug        Immunocompromised              Follow up in about 3 months (around 3/22/2021).    Disclaimer: This note was prepared using voice recognition system and is likely to have sound alike errors and is not proof read.  Please call me with any questions.

## 2020-12-22 NOTE — Clinical Note
Please schedule ALL 4 labs asap. UA and urine protein creatinine ratio  with blood test.  FOLLOW UP IN 3 MONTHS.

## 2020-12-23 ENCOUNTER — TELEPHONE (OUTPATIENT)
Dept: RHEUMATOLOGY | Facility: CLINIC | Age: 45
End: 2020-12-23

## 2020-12-23 NOTE — TELEPHONE ENCOUNTER
Spoke with pt and scheduled labs for 12.28.20 at the [facility] and a 3 month follow up for 3.30.21 at 11.45 am. Pt verbalized understanding.

## 2020-12-23 NOTE — TELEPHONE ENCOUNTER
----- Message from Ruperto Noguera MD sent at 12/22/2020  5:32 PM CST -----  Please schedule ALL 4 labs asap. UA and urine protein creatinine ratio  with blood test.  FOLLOW UP IN 3 MONTHS.

## 2020-12-28 ENCOUNTER — TELEPHONE (OUTPATIENT)
Dept: RHEUMATOLOGY | Facility: CLINIC | Age: 45
End: 2020-12-28

## 2020-12-28 ENCOUNTER — LAB VISIT (OUTPATIENT)
Dept: LAB | Facility: HOSPITAL | Age: 45
End: 2020-12-28
Attending: INTERNAL MEDICINE
Payer: MEDICAID

## 2020-12-28 DIAGNOSIS — M06.4 INFLAMMATORY POLYARTHRITIS: ICD-10-CM

## 2020-12-28 DIAGNOSIS — E87.6 HYPOPOTASSEMIA: Primary | ICD-10-CM

## 2020-12-28 LAB
ALBUMIN SERPL BCP-MCNC: 3.4 G/DL (ref 3.5–5.2)
ALP SERPL-CCNC: 76 U/L (ref 55–135)
ALT SERPL W/O P-5'-P-CCNC: 29 U/L (ref 10–44)
ANION GAP SERPL CALC-SCNC: 11 MMOL/L (ref 8–16)
AST SERPL-CCNC: 19 U/L (ref 10–40)
BASOPHILS # BLD AUTO: 0.03 K/UL (ref 0–0.2)
BASOPHILS NFR BLD: 0.8 % (ref 0–1.9)
BILIRUB SERPL-MCNC: 0.4 MG/DL (ref 0.1–1)
BUN SERPL-MCNC: 16 MG/DL (ref 6–20)
CALCIUM SERPL-MCNC: 9.2 MG/DL (ref 8.7–10.5)
CHLORIDE SERPL-SCNC: 99 MMOL/L (ref 95–110)
CO2 SERPL-SCNC: 32 MMOL/L (ref 23–29)
CREAT SERPL-MCNC: 0.9 MG/DL (ref 0.5–1.4)
CRP SERPL-MCNC: 9.9 MG/L (ref 0–8.2)
DIFFERENTIAL METHOD: ABNORMAL
EOSINOPHIL # BLD AUTO: 0.1 K/UL (ref 0–0.5)
EOSINOPHIL NFR BLD: 1.6 % (ref 0–8)
ERYTHROCYTE [DISTWIDTH] IN BLOOD BY AUTOMATED COUNT: 14.4 % (ref 11.5–14.5)
ERYTHROCYTE [SEDIMENTATION RATE] IN BLOOD BY WESTERGREN METHOD: 68 MM/HR (ref 0–36)
EST. GFR  (AFRICAN AMERICAN): >60 ML/MIN/1.73 M^2
EST. GFR  (NON AFRICAN AMERICAN): >60 ML/MIN/1.73 M^2
GLUCOSE SERPL-MCNC: 127 MG/DL (ref 70–110)
HCT VFR BLD AUTO: 37.7 % (ref 37–48.5)
HGB BLD-MCNC: 12.2 G/DL (ref 12–16)
IMM GRANULOCYTES # BLD AUTO: 0.01 K/UL (ref 0–0.04)
IMM GRANULOCYTES NFR BLD AUTO: 0.3 % (ref 0–0.5)
LYMPHOCYTES # BLD AUTO: 1.9 K/UL (ref 1–4.8)
LYMPHOCYTES NFR BLD: 50 % (ref 18–48)
MCH RBC QN AUTO: 28.6 PG (ref 27–31)
MCHC RBC AUTO-ENTMCNC: 32.4 G/DL (ref 32–36)
MCV RBC AUTO: 88 FL (ref 82–98)
MONOCYTES # BLD AUTO: 0.5 K/UL (ref 0.3–1)
MONOCYTES NFR BLD: 13.4 % (ref 4–15)
NEUTROPHILS # BLD AUTO: 1.3 K/UL (ref 1.8–7.7)
NEUTROPHILS NFR BLD: 33.9 % (ref 38–73)
NRBC BLD-RTO: 0 /100 WBC
PLATELET # BLD AUTO: 381 K/UL (ref 150–350)
PMV BLD AUTO: 10.2 FL (ref 9.2–12.9)
POTASSIUM SERPL-SCNC: 2.6 MMOL/L (ref 3.5–5.1)
PROT SERPL-MCNC: 7.6 G/DL (ref 6–8.4)
RBC # BLD AUTO: 4.27 M/UL (ref 4–5.4)
SODIUM SERPL-SCNC: 142 MMOL/L (ref 136–145)
WBC # BLD AUTO: 3.82 K/UL (ref 3.9–12.7)

## 2020-12-28 PROCEDURE — 85025 COMPLETE CBC W/AUTO DIFF WBC: CPT

## 2020-12-28 PROCEDURE — 36415 COLL VENOUS BLD VENIPUNCTURE: CPT

## 2020-12-28 PROCEDURE — 85652 RBC SED RATE AUTOMATED: CPT

## 2020-12-28 PROCEDURE — 80053 COMPREHEN METABOLIC PANEL: CPT

## 2020-12-28 PROCEDURE — 86140 C-REACTIVE PROTEIN: CPT

## 2020-12-28 RX ORDER — POTASSIUM CHLORIDE 20 MEQ/1
20 TABLET, EXTENDED RELEASE ORAL 2 TIMES DAILY
Qty: 60 TABLET | Refills: 6 | Status: SHIPPED | OUTPATIENT
Start: 2020-12-28

## 2020-12-28 NOTE — TELEPHONE ENCOUNTER
Spoke with pt and she states that she is currently taking 20 meq of potassium daily and denies any symptoms at this time. Pt states that her potassium level always comes back low. ( 10.1.20- 2.6, 10.12.20- 3.2)

## 2020-12-28 NOTE — TELEPHONE ENCOUNTER
Have her increase her potassium supplement 20 mEq from once a day up to twice a day and I will send in new script to [facility]  Follow up w/dr LÓPEZ as scheduled and can repeat cmp to check potassium

## 2020-12-28 NOTE — TELEPHONE ENCOUNTER
----- Message from Ruperto Ngouera MD sent at 12/28/2020 11:08 AM CST -----  Critically low potassium , probably due to her anti hypertensive medications. I called her, no answer, couldnt leave voicemail since it was not set up.  If she calls back , please advice to contact her cardiologist/PCP office immediately to correct her potassium.   Thanks.

## 2021-01-11 DIAGNOSIS — R80.0 ISOLATED PROTEINURIA WITHOUT SPECIFIC MORPHOLOGIC LESION: Primary | ICD-10-CM

## 2021-01-11 DIAGNOSIS — M31.7 MICROSCOPIC POLYANGIITIS: ICD-10-CM

## 2021-01-11 PROBLEM — N06.9 ISOLATED PROTEINURIA WITH MORPHOLOGIC LESION: Status: ACTIVE | Noted: 2021-01-11

## 2021-03-26 ENCOUNTER — PATIENT MESSAGE (OUTPATIENT)
Dept: RHEUMATOLOGY | Facility: CLINIC | Age: 46
End: 2021-03-26

## 2021-03-30 ENCOUNTER — TELEPHONE (OUTPATIENT)
Dept: NEPHROLOGY | Facility: CLINIC | Age: 46
End: 2021-03-30

## 2021-03-30 ENCOUNTER — LAB VISIT (OUTPATIENT)
Dept: LAB | Facility: HOSPITAL | Age: 46
End: 2021-03-30
Attending: INTERNAL MEDICINE
Payer: MEDICAID

## 2021-03-30 ENCOUNTER — OFFICE VISIT (OUTPATIENT)
Dept: RHEUMATOLOGY | Facility: CLINIC | Age: 46
End: 2021-03-30
Payer: MEDICAID

## 2021-03-30 ENCOUNTER — TELEPHONE (OUTPATIENT)
Dept: RHEUMATOLOGY | Facility: CLINIC | Age: 46
End: 2021-03-30

## 2021-03-30 VITALS
BODY MASS INDEX: 41.3 KG/M2 | DIASTOLIC BLOOD PRESSURE: 102 MMHG | SYSTOLIC BLOOD PRESSURE: 159 MMHG | HEIGHT: 62 IN | HEART RATE: 78 BPM | WEIGHT: 224.44 LBS

## 2021-03-30 DIAGNOSIS — M31.7 MICROSCOPIC POLYANGIITIS: ICD-10-CM

## 2021-03-30 DIAGNOSIS — M31.7 MICROSCOPIC POLYANGIITIS: Primary | ICD-10-CM

## 2021-03-30 DIAGNOSIS — R80.0 ISOLATED PROTEINURIA WITHOUT SPECIFIC MORPHOLOGIC LESION: ICD-10-CM

## 2021-03-30 DIAGNOSIS — M06.09 RHEUMATOID ARTHRITIS OF MULTIPLE SITES WITH NEGATIVE RHEUMATOID FACTOR: ICD-10-CM

## 2021-03-30 DIAGNOSIS — Z22.7 TB LUNG, LATENT: ICD-10-CM

## 2021-03-30 LAB
ALBUMIN SERPL BCP-MCNC: 4 G/DL (ref 3.5–5.2)
ALP SERPL-CCNC: 77 U/L (ref 55–135)
ALT SERPL W/O P-5'-P-CCNC: 110 U/L (ref 10–44)
ANION GAP SERPL CALC-SCNC: 11 MMOL/L (ref 8–16)
AST SERPL-CCNC: 69 U/L (ref 10–40)
BACTERIA #/AREA URNS HPF: ABNORMAL /HPF
BASOPHILS # BLD AUTO: 0.03 K/UL (ref 0–0.2)
BASOPHILS NFR BLD: 0.5 % (ref 0–1.9)
BILIRUB SERPL-MCNC: 0.6 MG/DL (ref 0.1–1)
BILIRUB UR QL STRIP: NEGATIVE
BUN SERPL-MCNC: 24 MG/DL (ref 6–20)
CALCIUM SERPL-MCNC: 10.1 MG/DL (ref 8.7–10.5)
CHLORIDE SERPL-SCNC: 97 MMOL/L (ref 95–110)
CK SERPL-CCNC: 41 U/L (ref 20–180)
CLARITY UR: ABNORMAL
CO2 SERPL-SCNC: 30 MMOL/L (ref 23–29)
COLOR UR: YELLOW
CREAT SERPL-MCNC: 1.3 MG/DL (ref 0.5–1.4)
CRP SERPL-MCNC: 8.7 MG/L (ref 0–8.2)
DIFFERENTIAL METHOD: ABNORMAL
EOSINOPHIL # BLD AUTO: 0.1 K/UL (ref 0–0.5)
EOSINOPHIL NFR BLD: 1.6 % (ref 0–8)
ERYTHROCYTE [DISTWIDTH] IN BLOOD BY AUTOMATED COUNT: 15.6 % (ref 11.5–14.5)
ERYTHROCYTE [SEDIMENTATION RATE] IN BLOOD BY WESTERGREN METHOD: 60 MM/HR (ref 0–20)
EST. GFR  (AFRICAN AMERICAN): 56 ML/MIN/1.73 M^2
EST. GFR  (NON AFRICAN AMERICAN): 49 ML/MIN/1.73 M^2
GLUCOSE SERPL-MCNC: 91 MG/DL (ref 70–110)
GLUCOSE UR QL STRIP: NEGATIVE
HCT VFR BLD AUTO: 40.7 % (ref 37–48.5)
HGB BLD-MCNC: 13 G/DL (ref 12–16)
HGB UR QL STRIP: NEGATIVE
HYALINE CASTS #/AREA URNS LPF: 0 /LPF
IMM GRANULOCYTES # BLD AUTO: 0.01 K/UL (ref 0–0.04)
IMM GRANULOCYTES NFR BLD AUTO: 0.2 % (ref 0–0.5)
KETONES UR QL STRIP: NEGATIVE
LEUKOCYTE ESTERASE UR QL STRIP: NEGATIVE
LYMPHOCYTES # BLD AUTO: 2.5 K/UL (ref 1–4.8)
LYMPHOCYTES NFR BLD: 43.2 % (ref 18–48)
MCH RBC QN AUTO: 30.3 PG (ref 27–31)
MCHC RBC AUTO-ENTMCNC: 31.9 G/DL (ref 32–36)
MCV RBC AUTO: 95 FL (ref 82–98)
MICROSCOPIC COMMENT: ABNORMAL
MONOCYTES # BLD AUTO: 0.3 K/UL (ref 0.3–1)
MONOCYTES NFR BLD: 5.5 % (ref 4–15)
NEUTROPHILS # BLD AUTO: 2.8 K/UL (ref 1.8–7.7)
NEUTROPHILS NFR BLD: 49 % (ref 38–73)
NITRITE UR QL STRIP: NEGATIVE
NRBC BLD-RTO: 0 /100 WBC
PH UR STRIP: 6 [PH] (ref 5–8)
PLATELET # BLD AUTO: 367 K/UL (ref 150–450)
PMV BLD AUTO: 11.1 FL (ref 9.2–12.9)
POTASSIUM SERPL-SCNC: 4.1 MMOL/L (ref 3.5–5.1)
PROT SERPL-MCNC: 8.1 G/DL (ref 6–8.4)
PROT UR QL STRIP: ABNORMAL
RBC # BLD AUTO: 4.29 M/UL (ref 4–5.4)
RBC #/AREA URNS HPF: 5 /HPF (ref 0–4)
SODIUM SERPL-SCNC: 138 MMOL/L (ref 136–145)
SP GR UR STRIP: 1.02 (ref 1–1.03)
SQUAMOUS #/AREA URNS HPF: 30 /HPF
TRICHOMONAS UR QL MICRO: ABNORMAL
URN SPEC COLLECT METH UR: ABNORMAL
WBC # BLD AUTO: 5.67 K/UL (ref 3.9–12.7)
WBC #/AREA URNS HPF: 8 /HPF (ref 0–5)

## 2021-03-30 PROCEDURE — 85651 RBC SED RATE NONAUTOMATED: CPT | Performed by: INTERNAL MEDICINE

## 2021-03-30 PROCEDURE — 36415 COLL VENOUS BLD VENIPUNCTURE: CPT | Performed by: INTERNAL MEDICINE

## 2021-03-30 PROCEDURE — 85025 COMPLETE CBC W/AUTO DIFF WBC: CPT | Performed by: INTERNAL MEDICINE

## 2021-03-30 PROCEDURE — 99999 PR PBB SHADOW E&M-EST. PATIENT-LVL V: CPT | Mod: PBBFAC,,, | Performed by: INTERNAL MEDICINE

## 2021-03-30 PROCEDURE — 99215 OFFICE O/P EST HI 40 MIN: CPT | Mod: S$PBB,,, | Performed by: INTERNAL MEDICINE

## 2021-03-30 PROCEDURE — 80053 COMPREHEN METABOLIC PANEL: CPT | Performed by: INTERNAL MEDICINE

## 2021-03-30 PROCEDURE — 84156 ASSAY OF PROTEIN URINE: CPT | Performed by: INTERNAL MEDICINE

## 2021-03-30 PROCEDURE — 99999 PR PBB SHADOW E&M-EST. PATIENT-LVL V: ICD-10-PCS | Mod: PBBFAC,,, | Performed by: INTERNAL MEDICINE

## 2021-03-30 PROCEDURE — 82550 ASSAY OF CK (CPK): CPT | Performed by: INTERNAL MEDICINE

## 2021-03-30 PROCEDURE — 81000 URINALYSIS NONAUTO W/SCOPE: CPT | Performed by: INTERNAL MEDICINE

## 2021-03-30 PROCEDURE — 99215 PR OFFICE/OUTPT VISIT, EST, LEVL V, 40-54 MIN: ICD-10-PCS | Mod: S$PBB,,, | Performed by: INTERNAL MEDICINE

## 2021-03-30 PROCEDURE — 86480 TB TEST CELL IMMUN MEASURE: CPT | Performed by: INTERNAL MEDICINE

## 2021-03-30 PROCEDURE — 99215 OFFICE O/P EST HI 40 MIN: CPT | Mod: PBBFAC | Performed by: INTERNAL MEDICINE

## 2021-03-30 PROCEDURE — 82085 ASSAY OF ALDOLASE: CPT | Performed by: INTERNAL MEDICINE

## 2021-03-30 PROCEDURE — 86140 C-REACTIVE PROTEIN: CPT | Performed by: INTERNAL MEDICINE

## 2021-03-30 RX ORDER — TRIAMTERENE/HYDROCHLOROTHIAZID 37.5-25 MG
1 TABLET ORAL EVERY MORNING
COMMUNITY
Start: 2021-03-10 | End: 2022-04-27

## 2021-03-30 RX ORDER — IRBESARTAN AND HYDROCHLOROTHIAZIDE 300; 12.5 MG/1; MG/1
1 TABLET, FILM COATED ORAL DAILY
COMMUNITY
Start: 2021-03-04 | End: 2022-04-27

## 2021-03-30 RX ORDER — NIFEDIPINE 30 MG/1
TABLET, FILM COATED, EXTENDED RELEASE ORAL
COMMUNITY
Start: 2021-02-01 | End: 2022-04-27

## 2021-03-30 RX ORDER — LEFLUNOMIDE 10 MG/1
10 TABLET ORAL DAILY
Qty: 30 TABLET | Refills: 2 | Status: SHIPPED | OUTPATIENT
Start: 2021-03-30 | End: 2021-04-20

## 2021-03-30 RX ORDER — MIRTAZAPINE 15 MG/1
15 TABLET, FILM COATED ORAL NIGHTLY
COMMUNITY
Start: 2021-03-28 | End: 2022-10-12

## 2021-03-30 RX ORDER — BUSPIRONE HYDROCHLORIDE 15 MG/1
15 TABLET ORAL 2 TIMES DAILY
COMMUNITY
Start: 2021-03-12 | End: 2021-10-05 | Stop reason: ALTCHOICE

## 2021-03-30 RX ORDER — GABAPENTIN 600 MG/1
600 TABLET ORAL 4 TIMES DAILY
COMMUNITY
Start: 2021-03-12 | End: 2021-10-05

## 2021-03-30 RX ORDER — FUROSEMIDE 20 MG/1
20 TABLET ORAL 2 TIMES DAILY
COMMUNITY
Start: 2021-03-10 | End: 2022-10-12

## 2021-03-30 RX ORDER — SPIRONOLACTONE 25 MG/1
25 TABLET ORAL DAILY
COMMUNITY
Start: 2021-03-10 | End: 2022-10-12

## 2021-03-30 RX ORDER — BUPROPION HYDROCHLORIDE 150 MG/1
TABLET ORAL
COMMUNITY
Start: 2021-02-22 | End: 2021-05-26

## 2021-03-30 RX ORDER — TRAMADOL HYDROCHLORIDE 50 MG/1
50 TABLET ORAL EVERY 12 HOURS PRN
Qty: 14 TABLET | Refills: 0 | Status: SHIPPED | OUTPATIENT
Start: 2021-03-30 | End: 2022-03-10

## 2021-03-30 RX ORDER — MELOXICAM 7.5 MG/1
7.5 TABLET ORAL 2 TIMES DAILY PRN
COMMUNITY
Start: 2021-03-22 | End: 2021-10-05 | Stop reason: ALTCHOICE

## 2021-03-30 RX ORDER — CLONIDINE HYDROCHLORIDE 0.3 MG/1
TABLET ORAL
COMMUNITY
Start: 2021-01-19 | End: 2022-10-12

## 2021-03-31 ENCOUNTER — PATIENT MESSAGE (OUTPATIENT)
Dept: RHEUMATOLOGY | Facility: CLINIC | Age: 46
End: 2021-03-31

## 2021-03-31 LAB
ALDOLASE SERPL-CCNC: 10.1 U/L (ref 1.2–7.6)
CREAT UR-MCNC: 208 MG/DL (ref 15–325)
PROT UR-MCNC: 42 MG/DL (ref 0–15)
PROT/CREAT UR: 0.2 MG/G{CREAT} (ref 0–0.2)

## 2021-03-31 RX ORDER — SODIUM CHLORIDE 0.9 % (FLUSH) 0.9 %
10 SYRINGE (ML) INJECTION
Status: CANCELLED | OUTPATIENT
Start: 2021-03-31

## 2021-03-31 RX ORDER — HEPARIN 100 UNIT/ML
500 SYRINGE INTRAVENOUS
Status: CANCELLED | OUTPATIENT
Start: 2021-03-31

## 2021-03-31 RX ORDER — FAMOTIDINE 10 MG/ML
20 INJECTION INTRAVENOUS
Status: CANCELLED | OUTPATIENT
Start: 2021-03-31

## 2021-03-31 RX ORDER — ACETAMINOPHEN 325 MG/1
650 TABLET ORAL
Status: CANCELLED | OUTPATIENT
Start: 2021-03-31

## 2021-04-01 ENCOUNTER — PATIENT MESSAGE (OUTPATIENT)
Dept: RHEUMATOLOGY | Facility: CLINIC | Age: 46
End: 2021-04-01

## 2021-04-01 ENCOUNTER — TELEPHONE (OUTPATIENT)
Dept: INFUSION THERAPY | Facility: HOSPITAL | Age: 46
End: 2021-04-01

## 2021-04-01 DIAGNOSIS — Z22.7 TB LUNG, LATENT: Primary | ICD-10-CM

## 2021-04-01 LAB
GAMMA INTERFERON BACKGROUND BLD IA-ACNC: 0.08 IU/ML
M TB IFN-G CD4+ BCKGRND COR BLD-ACNC: 0.81 IU/ML
MITOGEN IGNF BCKGRD COR BLD-ACNC: 8.6 IU/ML
TB GOLD PLUS: POSITIVE
TB2 - NIL: 0.45 IU/ML

## 2021-04-09 ENCOUNTER — PATIENT MESSAGE (OUTPATIENT)
Dept: INFUSION THERAPY | Facility: HOSPITAL | Age: 46
End: 2021-04-09

## 2021-04-09 ENCOUNTER — TELEPHONE (OUTPATIENT)
Dept: INFUSION THERAPY | Facility: HOSPITAL | Age: 46
End: 2021-04-09

## 2021-04-12 ENCOUNTER — TELEPHONE (OUTPATIENT)
Dept: INFUSION THERAPY | Facility: HOSPITAL | Age: 46
End: 2021-04-12

## 2021-04-19 ENCOUNTER — TELEPHONE (OUTPATIENT)
Dept: RHEUMATOLOGY | Facility: CLINIC | Age: 46
End: 2021-04-19

## 2021-04-19 ENCOUNTER — PATIENT MESSAGE (OUTPATIENT)
Dept: INFUSION THERAPY | Facility: HOSPITAL | Age: 46
End: 2021-04-19

## 2021-04-19 ENCOUNTER — TELEPHONE (OUTPATIENT)
Dept: INFUSION THERAPY | Facility: HOSPITAL | Age: 46
End: 2021-04-19

## 2021-04-20 ENCOUNTER — OFFICE VISIT (OUTPATIENT)
Dept: RHEUMATOLOGY | Facility: CLINIC | Age: 46
End: 2021-04-20
Payer: MEDICAID

## 2021-04-20 ENCOUNTER — TELEPHONE (OUTPATIENT)
Dept: RHEUMATOLOGY | Facility: CLINIC | Age: 46
End: 2021-04-20

## 2021-04-20 ENCOUNTER — LAB VISIT (OUTPATIENT)
Dept: LAB | Facility: HOSPITAL | Age: 46
End: 2021-04-20
Attending: FAMILY MEDICINE
Payer: MEDICAID

## 2021-04-20 VITALS
SYSTOLIC BLOOD PRESSURE: 160 MMHG | DIASTOLIC BLOOD PRESSURE: 100 MMHG | HEART RATE: 98 BPM | WEIGHT: 222.69 LBS | HEIGHT: 62 IN | RESPIRATION RATE: 16 BRPM | BODY MASS INDEX: 40.98 KG/M2

## 2021-04-20 DIAGNOSIS — Z22.7 TB LUNG, LATENT: ICD-10-CM

## 2021-04-20 DIAGNOSIS — M06.09 RHEUMATOID ARTHRITIS OF MULTIPLE SITES WITH NEGATIVE RHEUMATOID FACTOR: Primary | ICD-10-CM

## 2021-04-20 DIAGNOSIS — M31.7 MICROSCOPIC POLYANGIITIS: ICD-10-CM

## 2021-04-20 DIAGNOSIS — M06.4 INFLAMMATORY POLYARTHRITIS: ICD-10-CM

## 2021-04-20 LAB
ALBUMIN SERPL BCP-MCNC: 3.7 G/DL (ref 3.5–5.2)
ALP SERPL-CCNC: 79 U/L (ref 55–135)
ALT SERPL W/O P-5'-P-CCNC: 32 U/L (ref 10–44)
ANION GAP SERPL CALC-SCNC: 13 MMOL/L (ref 8–16)
AST SERPL-CCNC: 25 U/L (ref 10–40)
BASOPHILS # BLD AUTO: 0.03 K/UL (ref 0–0.2)
BASOPHILS NFR BLD: 0.6 % (ref 0–1.9)
BILIRUB SERPL-MCNC: 0.3 MG/DL (ref 0.1–1)
BUN SERPL-MCNC: 17 MG/DL (ref 6–20)
CALCIUM SERPL-MCNC: 9.8 MG/DL (ref 8.7–10.5)
CHLORIDE SERPL-SCNC: 101 MMOL/L (ref 95–110)
CO2 SERPL-SCNC: 26 MMOL/L (ref 23–29)
CREAT SERPL-MCNC: 1.2 MG/DL (ref 0.5–1.4)
DIFFERENTIAL METHOD: ABNORMAL
EOSINOPHIL # BLD AUTO: 0.1 K/UL (ref 0–0.5)
EOSINOPHIL NFR BLD: 1.6 % (ref 0–8)
ERYTHROCYTE [DISTWIDTH] IN BLOOD BY AUTOMATED COUNT: 14.8 % (ref 11.5–14.5)
EST. GFR  (AFRICAN AMERICAN): >60 ML/MIN/1.73 M^2
EST. GFR  (NON AFRICAN AMERICAN): 54 ML/MIN/1.73 M^2
GLUCOSE SERPL-MCNC: 130 MG/DL (ref 70–110)
HCT VFR BLD AUTO: 39.7 % (ref 37–48.5)
HGB BLD-MCNC: 12.4 G/DL (ref 12–16)
IMM GRANULOCYTES # BLD AUTO: 0.01 K/UL (ref 0–0.04)
IMM GRANULOCYTES NFR BLD AUTO: 0.2 % (ref 0–0.5)
LYMPHOCYTES # BLD AUTO: 2 K/UL (ref 1–4.8)
LYMPHOCYTES NFR BLD: 40.2 % (ref 18–48)
MCH RBC QN AUTO: 29.6 PG (ref 27–31)
MCHC RBC AUTO-ENTMCNC: 31.2 G/DL (ref 32–36)
MCV RBC AUTO: 95 FL (ref 82–98)
MONOCYTES # BLD AUTO: 0.7 K/UL (ref 0.3–1)
MONOCYTES NFR BLD: 13.9 % (ref 4–15)
NEUTROPHILS # BLD AUTO: 2.2 K/UL (ref 1.8–7.7)
NEUTROPHILS NFR BLD: 43.5 % (ref 38–73)
NRBC BLD-RTO: 0 /100 WBC
PLATELET # BLD AUTO: 296 K/UL (ref 150–450)
PMV BLD AUTO: 11.1 FL (ref 9.2–12.9)
POTASSIUM SERPL-SCNC: 3.5 MMOL/L (ref 3.5–5.1)
PROT SERPL-MCNC: 8.3 G/DL (ref 6–8.4)
RBC # BLD AUTO: 4.19 M/UL (ref 4–5.4)
SODIUM SERPL-SCNC: 140 MMOL/L (ref 136–145)
WBC # BLD AUTO: 5.03 K/UL (ref 3.9–12.7)

## 2021-04-20 PROCEDURE — 99999 PR PBB SHADOW E&M-EST. PATIENT-LVL V: ICD-10-PCS | Mod: PBBFAC,,, | Performed by: INTERNAL MEDICINE

## 2021-04-20 PROCEDURE — 36415 COLL VENOUS BLD VENIPUNCTURE: CPT | Performed by: INTERNAL MEDICINE

## 2021-04-20 PROCEDURE — 99999 PR PBB SHADOW E&M-EST. PATIENT-LVL V: CPT | Mod: PBBFAC,,, | Performed by: INTERNAL MEDICINE

## 2021-04-20 PROCEDURE — 99215 OFFICE O/P EST HI 40 MIN: CPT | Mod: PBBFAC | Performed by: INTERNAL MEDICINE

## 2021-04-20 PROCEDURE — 99215 OFFICE O/P EST HI 40 MIN: CPT | Mod: S$PBB,,, | Performed by: INTERNAL MEDICINE

## 2021-04-20 PROCEDURE — 85025 COMPLETE CBC W/AUTO DIFF WBC: CPT | Performed by: INTERNAL MEDICINE

## 2021-04-20 PROCEDURE — 99215 PR OFFICE/OUTPT VISIT, EST, LEVL V, 40-54 MIN: ICD-10-PCS | Mod: S$PBB,,, | Performed by: INTERNAL MEDICINE

## 2021-04-20 PROCEDURE — 80053 COMPREHEN METABOLIC PANEL: CPT | Performed by: INTERNAL MEDICINE

## 2021-04-20 RX ORDER — BARICITINIB 2 MG/1
2 TABLET, FILM COATED ORAL DAILY
Qty: 30 TABLET | Refills: 11 | OUTPATIENT
Start: 2021-04-20 | End: 2021-05-13 | Stop reason: ALTCHOICE

## 2021-04-20 RX ORDER — RIFAMPIN 300 MG/1
600 CAPSULE ORAL DAILY
Qty: 60 CAPSULE | Refills: 3 | Status: SHIPPED | OUTPATIENT
Start: 2021-04-20 | End: 2021-10-05 | Stop reason: ALTCHOICE

## 2021-04-20 RX ORDER — UPADACITINIB 15 MG/1
15 TABLET, EXTENDED RELEASE ORAL DAILY
Qty: 30 TABLET | Refills: 11 | OUTPATIENT
Start: 2021-04-20 | End: 2021-04-20 | Stop reason: ALTCHOICE

## 2021-04-21 ENCOUNTER — TELEPHONE (OUTPATIENT)
Dept: RHEUMATOLOGY | Facility: CLINIC | Age: 46
End: 2021-04-21

## 2021-04-22 ENCOUNTER — TELEPHONE (OUTPATIENT)
Dept: RHEUMATOLOGY | Facility: CLINIC | Age: 46
End: 2021-04-22

## 2021-04-22 ENCOUNTER — SPECIALTY PHARMACY (OUTPATIENT)
Dept: PHARMACY | Facility: CLINIC | Age: 46
End: 2021-04-22

## 2021-04-23 ENCOUNTER — INFUSION (OUTPATIENT)
Dept: INFUSION THERAPY | Facility: HOSPITAL | Age: 46
End: 2021-04-23
Attending: INTERNAL MEDICINE
Payer: MEDICAID

## 2021-04-23 VITALS
TEMPERATURE: 98 F | HEART RATE: 73 BPM | SYSTOLIC BLOOD PRESSURE: 138 MMHG | BODY MASS INDEX: 42.03 KG/M2 | DIASTOLIC BLOOD PRESSURE: 74 MMHG | WEIGHT: 228.38 LBS | OXYGEN SATURATION: 98 % | HEIGHT: 62 IN | RESPIRATION RATE: 18 BRPM

## 2021-04-23 DIAGNOSIS — M31.7 MICROSCOPIC POLYANGIITIS: Primary | ICD-10-CM

## 2021-04-23 DIAGNOSIS — M06.09 RHEUMATOID ARTHRITIS OF MULTIPLE SITES WITH NEGATIVE RHEUMATOID FACTOR: ICD-10-CM

## 2021-04-23 PROCEDURE — 63600175 PHARM REV CODE 636 W HCPCS: Performed by: INTERNAL MEDICINE

## 2021-04-23 PROCEDURE — A4216 STERILE WATER/SALINE, 10 ML: HCPCS | Performed by: INTERNAL MEDICINE

## 2021-04-23 PROCEDURE — 96413 CHEMO IV INFUSION 1 HR: CPT

## 2021-04-23 PROCEDURE — 96415 CHEMO IV INFUSION ADDL HR: CPT

## 2021-04-23 PROCEDURE — 96375 TX/PRO/DX INJ NEW DRUG ADDON: CPT

## 2021-04-23 PROCEDURE — 25000003 PHARM REV CODE 250: Performed by: INTERNAL MEDICINE

## 2021-04-23 RX ORDER — DIPHENHYDRAMINE HYDROCHLORIDE 50 MG/ML
25 INJECTION INTRAMUSCULAR; INTRAVENOUS
Status: CANCELLED
Start: 2021-04-23

## 2021-04-23 RX ORDER — SODIUM CHLORIDE 0.9 % (FLUSH) 0.9 %
10 SYRINGE (ML) INJECTION
Status: DISCONTINUED | OUTPATIENT
Start: 2021-04-23 | End: 2021-04-23 | Stop reason: HOSPADM

## 2021-04-23 RX ORDER — ACETAMINOPHEN 325 MG/1
650 TABLET ORAL
Status: COMPLETED | OUTPATIENT
Start: 2021-04-23 | End: 2021-04-23

## 2021-04-23 RX ORDER — HEPARIN 100 UNIT/ML
500 SYRINGE INTRAVENOUS
Status: CANCELLED | OUTPATIENT
Start: 2021-04-23

## 2021-04-23 RX ORDER — FAMOTIDINE 10 MG/ML
20 INJECTION INTRAVENOUS
Status: COMPLETED | OUTPATIENT
Start: 2021-04-23 | End: 2021-04-23

## 2021-04-23 RX ORDER — METHYLPREDNISOLONE SOD SUCC 125 MG
100 VIAL (EA) INJECTION
Status: CANCELLED
Start: 2021-04-23

## 2021-04-23 RX ORDER — ACETAMINOPHEN 325 MG/1
650 TABLET ORAL
Status: CANCELLED | OUTPATIENT
Start: 2021-04-23

## 2021-04-23 RX ORDER — SODIUM CHLORIDE 0.9 % (FLUSH) 0.9 %
10 SYRINGE (ML) INJECTION
Status: CANCELLED | OUTPATIENT
Start: 2021-04-23

## 2021-04-23 RX ORDER — DIPHENHYDRAMINE HYDROCHLORIDE 50 MG/ML
25 INJECTION INTRAMUSCULAR; INTRAVENOUS
Status: COMPLETED | OUTPATIENT
Start: 2021-04-23 | End: 2021-04-23

## 2021-04-23 RX ORDER — FAMOTIDINE 10 MG/ML
20 INJECTION INTRAVENOUS
Status: CANCELLED | OUTPATIENT
Start: 2021-04-23

## 2021-04-23 RX ORDER — METHYLPREDNISOLONE SOD SUCC 125 MG
100 VIAL (EA) INJECTION
Status: COMPLETED | OUTPATIENT
Start: 2021-04-23 | End: 2021-04-23

## 2021-04-23 RX ADMIN — ACETAMINOPHEN 650 MG: 325 TABLET ORAL at 09:04

## 2021-04-23 RX ADMIN — Medication 10 ML: at 09:04

## 2021-04-23 RX ADMIN — METHYLPREDNISOLONE SODIUM SUCCINATE 100 MG: 125 INJECTION, POWDER, FOR SOLUTION INTRAMUSCULAR; INTRAVENOUS at 09:04

## 2021-04-23 RX ADMIN — DIPHENHYDRAMINE HYDROCHLORIDE 25 MG: 50 INJECTION INTRAMUSCULAR; INTRAVENOUS at 09:04

## 2021-04-23 RX ADMIN — RITUXIMAB 1000 MG: 10 INJECTION, SOLUTION INTRAVENOUS at 09:04

## 2021-04-23 RX ADMIN — FAMOTIDINE 20 MG: 10 INJECTION INTRAVENOUS at 09:04

## 2021-05-07 ENCOUNTER — INFUSION (OUTPATIENT)
Dept: INFUSION THERAPY | Facility: HOSPITAL | Age: 46
End: 2021-05-07
Attending: INTERNAL MEDICINE
Payer: MEDICAID

## 2021-05-07 VITALS
HEIGHT: 62 IN | BODY MASS INDEX: 42.8 KG/M2 | DIASTOLIC BLOOD PRESSURE: 88 MMHG | OXYGEN SATURATION: 98 % | HEART RATE: 75 BPM | TEMPERATURE: 98 F | RESPIRATION RATE: 18 BRPM | SYSTOLIC BLOOD PRESSURE: 155 MMHG | WEIGHT: 232.56 LBS

## 2021-05-07 DIAGNOSIS — M06.09 RHEUMATOID ARTHRITIS OF MULTIPLE SITES WITH NEGATIVE RHEUMATOID FACTOR: ICD-10-CM

## 2021-05-07 DIAGNOSIS — M31.7 MICROSCOPIC POLYANGIITIS: Primary | ICD-10-CM

## 2021-05-07 PROCEDURE — 25000003 PHARM REV CODE 250: Performed by: INTERNAL MEDICINE

## 2021-05-07 PROCEDURE — 63600175 PHARM REV CODE 636 W HCPCS: Performed by: INTERNAL MEDICINE

## 2021-05-07 PROCEDURE — 96415 CHEMO IV INFUSION ADDL HR: CPT

## 2021-05-07 PROCEDURE — 96375 TX/PRO/DX INJ NEW DRUG ADDON: CPT

## 2021-05-07 PROCEDURE — 96413 CHEMO IV INFUSION 1 HR: CPT

## 2021-05-07 RX ORDER — SODIUM CHLORIDE 0.9 % (FLUSH) 0.9 %
10 SYRINGE (ML) INJECTION
Status: CANCELLED | OUTPATIENT
Start: 2021-05-07

## 2021-05-07 RX ORDER — ACETAMINOPHEN 325 MG/1
650 TABLET ORAL
Status: COMPLETED | OUTPATIENT
Start: 2021-05-07 | End: 2021-05-07

## 2021-05-07 RX ORDER — SODIUM CHLORIDE 0.9 % (FLUSH) 0.9 %
10 SYRINGE (ML) INJECTION
Status: DISCONTINUED | OUTPATIENT
Start: 2021-05-07 | End: 2021-05-07 | Stop reason: HOSPADM

## 2021-05-07 RX ORDER — ACETAMINOPHEN 325 MG/1
650 TABLET ORAL
Status: CANCELLED | OUTPATIENT
Start: 2021-05-07

## 2021-05-07 RX ORDER — METHYLPREDNISOLONE SOD SUCC 125 MG
100 VIAL (EA) INJECTION
Status: CANCELLED
Start: 2021-05-07

## 2021-05-07 RX ORDER — DIPHENHYDRAMINE HYDROCHLORIDE 50 MG/ML
25 INJECTION INTRAMUSCULAR; INTRAVENOUS
Status: CANCELLED
Start: 2021-05-07

## 2021-05-07 RX ORDER — METHYLPREDNISOLONE SOD SUCC 125 MG
100 VIAL (EA) INJECTION
Status: COMPLETED | OUTPATIENT
Start: 2021-05-07 | End: 2021-05-07

## 2021-05-07 RX ORDER — HEPARIN 100 UNIT/ML
500 SYRINGE INTRAVENOUS
Status: CANCELLED | OUTPATIENT
Start: 2021-05-07

## 2021-05-07 RX ORDER — FAMOTIDINE 10 MG/ML
20 INJECTION INTRAVENOUS
Status: COMPLETED | OUTPATIENT
Start: 2021-05-07 | End: 2021-05-07

## 2021-05-07 RX ORDER — DIPHENHYDRAMINE HYDROCHLORIDE 50 MG/ML
25 INJECTION INTRAMUSCULAR; INTRAVENOUS
Status: COMPLETED | OUTPATIENT
Start: 2021-05-07 | End: 2021-05-07

## 2021-05-07 RX ORDER — FAMOTIDINE 10 MG/ML
20 INJECTION INTRAVENOUS
Status: CANCELLED | OUTPATIENT
Start: 2021-05-07

## 2021-05-07 RX ADMIN — RITUXIMAB 1000 MG: 10 INJECTION, SOLUTION INTRAVENOUS at 09:05

## 2021-05-07 RX ADMIN — DIPHENHYDRAMINE HYDROCHLORIDE 25 MG: 50 INJECTION INTRAMUSCULAR; INTRAVENOUS at 08:05

## 2021-05-07 RX ADMIN — FAMOTIDINE 20 MG: 10 INJECTION INTRAVENOUS at 08:05

## 2021-05-07 RX ADMIN — ACETAMINOPHEN 650 MG: 325 TABLET ORAL at 08:05

## 2021-05-07 RX ADMIN — METHYLPREDNISOLONE SODIUM SUCCINATE 100 MG: 125 INJECTION, POWDER, FOR SOLUTION INTRAMUSCULAR; INTRAVENOUS at 08:05

## 2021-05-12 ENCOUNTER — PATIENT MESSAGE (OUTPATIENT)
Dept: RHEUMATOLOGY | Facility: CLINIC | Age: 46
End: 2021-05-12

## 2021-05-24 ENCOUNTER — TELEPHONE (OUTPATIENT)
Dept: RHEUMATOLOGY | Facility: CLINIC | Age: 46
End: 2021-05-24

## 2021-05-26 ENCOUNTER — OFFICE VISIT (OUTPATIENT)
Dept: RHEUMATOLOGY | Facility: CLINIC | Age: 46
End: 2021-05-26
Payer: MEDICAID

## 2021-05-26 ENCOUNTER — LAB VISIT (OUTPATIENT)
Dept: LAB | Facility: HOSPITAL | Age: 46
End: 2021-05-26
Attending: INTERNAL MEDICINE
Payer: MEDICAID

## 2021-05-26 DIAGNOSIS — M06.09 RHEUMATOID ARTHRITIS OF MULTIPLE SITES WITH NEGATIVE RHEUMATOID FACTOR: Primary | ICD-10-CM

## 2021-05-26 DIAGNOSIS — M31.7 MICROSCOPIC POLYANGIITIS: ICD-10-CM

## 2021-05-26 DIAGNOSIS — Z79.899 LONG TERM CURRENT USE OF IMMUNOSUPPRESSIVE DRUG: ICD-10-CM

## 2021-05-26 DIAGNOSIS — D84.9 IMMUNOCOMPROMISED: ICD-10-CM

## 2021-05-26 DIAGNOSIS — M06.09 RHEUMATOID ARTHRITIS OF MULTIPLE SITES WITH NEGATIVE RHEUMATOID FACTOR: ICD-10-CM

## 2021-05-26 DIAGNOSIS — Z22.7 TB LUNG, LATENT: ICD-10-CM

## 2021-05-26 PROBLEM — Z79.60 LONG TERM CURRENT USE OF IMMUNOSUPPRESSIVE DRUG: Status: ACTIVE | Noted: 2021-05-26

## 2021-05-26 LAB
ALBUMIN SERPL BCP-MCNC: 3.6 G/DL (ref 3.5–5.2)
ALP SERPL-CCNC: 75 U/L (ref 55–135)
ALT SERPL W/O P-5'-P-CCNC: 15 U/L (ref 10–44)
ANION GAP SERPL CALC-SCNC: 10 MMOL/L (ref 8–16)
AST SERPL-CCNC: 13 U/L (ref 10–40)
BASOPHILS # BLD AUTO: 0.02 K/UL (ref 0–0.2)
BASOPHILS NFR BLD: 0.4 % (ref 0–1.9)
BILIRUB SERPL-MCNC: 0.2 MG/DL (ref 0.1–1)
BUN SERPL-MCNC: 16 MG/DL (ref 6–20)
CALCIUM SERPL-MCNC: 9 MG/DL (ref 8.7–10.5)
CHLORIDE SERPL-SCNC: 101 MMOL/L (ref 95–110)
CO2 SERPL-SCNC: 30 MMOL/L (ref 23–29)
CREAT SERPL-MCNC: 1.3 MG/DL (ref 0.5–1.4)
CRP SERPL-MCNC: 11.1 MG/L (ref 0–8.2)
DIFFERENTIAL METHOD: ABNORMAL
EOSINOPHIL # BLD AUTO: 0.1 K/UL (ref 0–0.5)
EOSINOPHIL NFR BLD: 1.9 % (ref 0–8)
ERYTHROCYTE [DISTWIDTH] IN BLOOD BY AUTOMATED COUNT: 13.2 % (ref 11.5–14.5)
ERYTHROCYTE [SEDIMENTATION RATE] IN BLOOD BY WESTERGREN METHOD: 36 MM/HR (ref 0–20)
EST. GFR  (AFRICAN AMERICAN): 56 ML/MIN/1.73 M^2
EST. GFR  (NON AFRICAN AMERICAN): 49 ML/MIN/1.73 M^2
GLUCOSE SERPL-MCNC: 104 MG/DL (ref 70–110)
HCT VFR BLD AUTO: 36 % (ref 37–48.5)
HGB BLD-MCNC: 11.8 G/DL (ref 12–16)
IMM GRANULOCYTES # BLD AUTO: 0.01 K/UL (ref 0–0.04)
IMM GRANULOCYTES NFR BLD AUTO: 0.2 % (ref 0–0.5)
LYMPHOCYTES # BLD AUTO: 2.1 K/UL (ref 1–4.8)
LYMPHOCYTES NFR BLD: 43.1 % (ref 18–48)
MCH RBC QN AUTO: 30.6 PG (ref 27–31)
MCHC RBC AUTO-ENTMCNC: 32.8 G/DL (ref 32–36)
MCV RBC AUTO: 94 FL (ref 82–98)
MONOCYTES # BLD AUTO: 0.9 K/UL (ref 0.3–1)
MONOCYTES NFR BLD: 18.3 % (ref 4–15)
NEUTROPHILS # BLD AUTO: 1.7 K/UL (ref 1.8–7.7)
NEUTROPHILS NFR BLD: 36.1 % (ref 38–73)
NRBC BLD-RTO: 0 /100 WBC
PLATELET # BLD AUTO: 277 K/UL (ref 150–450)
PMV BLD AUTO: 11.8 FL (ref 9.2–12.9)
POTASSIUM SERPL-SCNC: 3.1 MMOL/L (ref 3.5–5.1)
PROT SERPL-MCNC: 7.7 G/DL (ref 6–8.4)
RBC # BLD AUTO: 3.85 M/UL (ref 4–5.4)
SODIUM SERPL-SCNC: 141 MMOL/L (ref 136–145)
WBC # BLD AUTO: 4.8 K/UL (ref 3.9–12.7)

## 2021-05-26 PROCEDURE — 85025 COMPLETE CBC W/AUTO DIFF WBC: CPT | Performed by: INTERNAL MEDICINE

## 2021-05-26 PROCEDURE — 83516 IMMUNOASSAY NONANTIBODY: CPT | Mod: 59 | Performed by: INTERNAL MEDICINE

## 2021-05-26 PROCEDURE — 83516 IMMUNOASSAY NONANTIBODY: CPT | Performed by: INTERNAL MEDICINE

## 2021-05-26 PROCEDURE — 85651 RBC SED RATE NONAUTOMATED: CPT | Performed by: INTERNAL MEDICINE

## 2021-05-26 PROCEDURE — 86235 NUCLEAR ANTIGEN ANTIBODY: CPT | Mod: 59 | Performed by: INTERNAL MEDICINE

## 2021-05-26 PROCEDURE — 86160 COMPLEMENT ANTIGEN: CPT | Performed by: INTERNAL MEDICINE

## 2021-05-26 PROCEDURE — 86140 C-REACTIVE PROTEIN: CPT | Performed by: INTERNAL MEDICINE

## 2021-05-26 PROCEDURE — 99215 OFFICE O/P EST HI 40 MIN: CPT | Mod: 95,,, | Performed by: INTERNAL MEDICINE

## 2021-05-26 PROCEDURE — 99215 PR OFFICE/OUTPT VISIT, EST, LEVL V, 40-54 MIN: ICD-10-PCS | Mod: 95,,, | Performed by: INTERNAL MEDICINE

## 2021-05-26 PROCEDURE — 86160 COMPLEMENT ANTIGEN: CPT | Mod: 59 | Performed by: INTERNAL MEDICINE

## 2021-05-26 PROCEDURE — 80053 COMPREHEN METABOLIC PANEL: CPT | Performed by: INTERNAL MEDICINE

## 2021-05-26 PROCEDURE — 86038 ANTINUCLEAR ANTIBODIES: CPT | Performed by: INTERNAL MEDICINE

## 2021-05-26 PROCEDURE — 86039 ANTINUCLEAR ANTIBODIES (ANA): CPT | Performed by: INTERNAL MEDICINE

## 2021-05-26 PROCEDURE — 36415 COLL VENOUS BLD VENIPUNCTURE: CPT | Performed by: INTERNAL MEDICINE

## 2021-05-26 RX ORDER — PREDNISONE 10 MG/1
10 TABLET ORAL DAILY
Qty: 30 TABLET | Refills: 3 | Status: SHIPPED | OUTPATIENT
Start: 2021-05-26 | End: 2021-10-05

## 2021-05-27 ENCOUNTER — TELEPHONE (OUTPATIENT)
Dept: RHEUMATOLOGY | Facility: CLINIC | Age: 46
End: 2021-05-27

## 2021-05-27 LAB
C3 SERPL-MCNC: 139 MG/DL (ref 50–180)
C4 SERPL-MCNC: 25 MG/DL (ref 11–44)

## 2021-05-28 LAB
ANA PATTERN 1: NORMAL
ANA SER QL IF: POSITIVE
ANA TITR SER IF: NORMAL {TITER}

## 2021-06-01 LAB
ANTI SM ANTIBODY: 0.07 RATIO (ref 0–0.99)
ANTI SM/RNP ANTIBODY: 0.12 RATIO (ref 0–0.99)
ANTI-SM INTERPRETATION: NEGATIVE
ANTI-SM/RNP INTERPRETATION: NEGATIVE
ANTI-SSA ANTIBODY: 0.07 RATIO (ref 0–0.99)
ANTI-SSA INTERPRETATION: NEGATIVE
ANTI-SSB ANTIBODY: 0.06 RATIO (ref 0–0.99)
ANTI-SSB INTERPRETATION: NEGATIVE
DSDNA AB SER-ACNC: NORMAL [IU]/ML
MYELOPEROXIDASE AB SER-ACNC: 35 UNITS
PROTEINASE3 IGG SER-ACNC: 0.3 U

## 2021-06-23 ENCOUNTER — OFFICE VISIT (OUTPATIENT)
Dept: RHEUMATOLOGY | Facility: CLINIC | Age: 46
End: 2021-06-23
Payer: MEDICAID

## 2021-06-23 ENCOUNTER — LAB VISIT (OUTPATIENT)
Dept: LAB | Facility: HOSPITAL | Age: 46
End: 2021-06-23
Attending: INTERNAL MEDICINE
Payer: MEDICAID

## 2021-06-23 VITALS
SYSTOLIC BLOOD PRESSURE: 126 MMHG | HEART RATE: 77 BPM | DIASTOLIC BLOOD PRESSURE: 85 MMHG | BODY MASS INDEX: 42.84 KG/M2 | HEIGHT: 62 IN | WEIGHT: 232.81 LBS

## 2021-06-23 DIAGNOSIS — M31.7 MICROSCOPIC POLYANGIITIS: Primary | ICD-10-CM

## 2021-06-23 DIAGNOSIS — Z79.899 LONG TERM CURRENT USE OF IMMUNOSUPPRESSIVE DRUG: ICD-10-CM

## 2021-06-23 DIAGNOSIS — Z22.7 TB LUNG, LATENT: ICD-10-CM

## 2021-06-23 DIAGNOSIS — D84.9 IMMUNOCOMPROMISED: ICD-10-CM

## 2021-06-23 DIAGNOSIS — M31.7 MICROSCOPIC POLYANGIITIS: ICD-10-CM

## 2021-06-23 LAB
ALBUMIN SERPL BCP-MCNC: 3.5 G/DL (ref 3.5–5.2)
ALP SERPL-CCNC: 76 U/L (ref 55–135)
ALT SERPL W/O P-5'-P-CCNC: 18 U/L (ref 10–44)
ANION GAP SERPL CALC-SCNC: 13 MMOL/L (ref 8–16)
AST SERPL-CCNC: 12 U/L (ref 10–40)
BASOPHILS # BLD AUTO: 0.03 K/UL (ref 0–0.2)
BASOPHILS NFR BLD: 0.3 % (ref 0–1.9)
BILIRUB SERPL-MCNC: 0.2 MG/DL (ref 0.1–1)
BUN SERPL-MCNC: 21 MG/DL (ref 6–20)
CALCIUM SERPL-MCNC: 9.1 MG/DL (ref 8.7–10.5)
CHLORIDE SERPL-SCNC: 100 MMOL/L (ref 95–110)
CO2 SERPL-SCNC: 31 MMOL/L (ref 23–29)
CREAT SERPL-MCNC: 1.6 MG/DL (ref 0.5–1.4)
CRP SERPL-MCNC: 14.6 MG/L (ref 0–8.2)
DIFFERENTIAL METHOD: ABNORMAL
EOSINOPHIL # BLD AUTO: 0.1 K/UL (ref 0–0.5)
EOSINOPHIL NFR BLD: 1.3 % (ref 0–8)
ERYTHROCYTE [DISTWIDTH] IN BLOOD BY AUTOMATED COUNT: 13 % (ref 11.5–14.5)
ERYTHROCYTE [SEDIMENTATION RATE] IN BLOOD BY WESTERGREN METHOD: 102 MM/HR (ref 0–36)
EST. GFR  (AFRICAN AMERICAN): 44 ML/MIN/1.73 M^2
EST. GFR  (NON AFRICAN AMERICAN): 38 ML/MIN/1.73 M^2
GLUCOSE SERPL-MCNC: 85 MG/DL (ref 70–110)
HCT VFR BLD AUTO: 35.7 % (ref 37–48.5)
HGB BLD-MCNC: 11.5 G/DL (ref 12–16)
IMM GRANULOCYTES # BLD AUTO: 0.03 K/UL (ref 0–0.04)
IMM GRANULOCYTES NFR BLD AUTO: 0.3 % (ref 0–0.5)
LYMPHOCYTES # BLD AUTO: 3.1 K/UL (ref 1–4.8)
LYMPHOCYTES NFR BLD: 33.5 % (ref 18–48)
MCH RBC QN AUTO: 29.9 PG (ref 27–31)
MCHC RBC AUTO-ENTMCNC: 32.2 G/DL (ref 32–36)
MCV RBC AUTO: 93 FL (ref 82–98)
MONOCYTES # BLD AUTO: 1.3 K/UL (ref 0.3–1)
MONOCYTES NFR BLD: 13.5 % (ref 4–15)
NEUTROPHILS # BLD AUTO: 4.8 K/UL (ref 1.8–7.7)
NEUTROPHILS NFR BLD: 51.1 % (ref 38–73)
NRBC BLD-RTO: 0 /100 WBC
PLATELET # BLD AUTO: 324 K/UL (ref 150–450)
PMV BLD AUTO: 11.8 FL (ref 9.2–12.9)
POTASSIUM SERPL-SCNC: 3.3 MMOL/L (ref 3.5–5.1)
PROT SERPL-MCNC: 8 G/DL (ref 6–8.4)
RBC # BLD AUTO: 3.84 M/UL (ref 4–5.4)
SODIUM SERPL-SCNC: 144 MMOL/L (ref 136–145)
WBC # BLD AUTO: 9.35 K/UL (ref 3.9–12.7)

## 2021-06-23 PROCEDURE — 99999 PR PBB SHADOW E&M-EST. PATIENT-LVL V: ICD-10-PCS | Mod: PBBFAC,,, | Performed by: INTERNAL MEDICINE

## 2021-06-23 PROCEDURE — 86140 C-REACTIVE PROTEIN: CPT | Performed by: INTERNAL MEDICINE

## 2021-06-23 PROCEDURE — 99215 OFFICE O/P EST HI 40 MIN: CPT | Mod: PBBFAC | Performed by: INTERNAL MEDICINE

## 2021-06-23 PROCEDURE — 85652 RBC SED RATE AUTOMATED: CPT | Performed by: INTERNAL MEDICINE

## 2021-06-23 PROCEDURE — 0034U TPMT NUDT15 GENES: CPT | Performed by: INTERNAL MEDICINE

## 2021-06-23 PROCEDURE — 85025 COMPLETE CBC W/AUTO DIFF WBC: CPT | Performed by: INTERNAL MEDICINE

## 2021-06-23 PROCEDURE — 80053 COMPREHEN METABOLIC PANEL: CPT | Performed by: INTERNAL MEDICINE

## 2021-06-23 PROCEDURE — 36415 COLL VENOUS BLD VENIPUNCTURE: CPT | Performed by: INTERNAL MEDICINE

## 2021-06-23 PROCEDURE — 99215 OFFICE O/P EST HI 40 MIN: CPT | Mod: S$PBB,,, | Performed by: INTERNAL MEDICINE

## 2021-06-23 PROCEDURE — 99999 PR PBB SHADOW E&M-EST. PATIENT-LVL V: CPT | Mod: PBBFAC,,, | Performed by: INTERNAL MEDICINE

## 2021-06-23 PROCEDURE — 99215 PR OFFICE/OUTPT VISIT, EST, LEVL V, 40-54 MIN: ICD-10-PCS | Mod: S$PBB,,, | Performed by: INTERNAL MEDICINE

## 2021-06-23 RX ORDER — PANTOPRAZOLE SODIUM 20 MG/1
20 TABLET, DELAYED RELEASE ORAL EVERY MORNING
COMMUNITY
Start: 2021-06-08 | End: 2022-10-12

## 2021-06-23 RX ORDER — AZATHIOPRINE 50 MG/1
50 TABLET ORAL DAILY
Qty: 30 TABLET | Refills: 1 | Status: SHIPPED | OUTPATIENT
Start: 2021-06-23 | End: 2021-07-13

## 2021-06-23 RX ORDER — BUMETANIDE 2 MG/1
2 TABLET ORAL 2 TIMES DAILY
COMMUNITY
Start: 2021-05-28 | End: 2022-10-12

## 2021-06-23 RX ORDER — LIDOCAINE 50 MG/G
1 PATCH TOPICAL DAILY
COMMUNITY
Start: 2021-06-08 | End: 2023-03-30

## 2021-06-23 RX ORDER — OLMESARTAN MEDOXOMIL 20 MG/1
20 TABLET ORAL EVERY MORNING
COMMUNITY
Start: 2021-06-08 | End: 2022-10-12

## 2021-06-23 ASSESSMENT — ROUTINE ASSESSMENT OF PATIENT INDEX DATA (RAPID3): MDHAQ FUNCTION SCORE: 1.5

## 2021-06-24 ENCOUNTER — PATIENT MESSAGE (OUTPATIENT)
Dept: RHEUMATOLOGY | Facility: CLINIC | Age: 46
End: 2021-06-24

## 2021-06-24 ENCOUNTER — TELEPHONE (OUTPATIENT)
Dept: RHEUMATOLOGY | Facility: CLINIC | Age: 46
End: 2021-06-24

## 2021-06-24 RX ORDER — SODIUM CHLORIDE 0.9 % (FLUSH) 0.9 %
10 SYRINGE (ML) INJECTION
Status: CANCELLED | OUTPATIENT
Start: 2021-06-24

## 2021-06-24 RX ORDER — HEPARIN 100 UNIT/ML
500 SYRINGE INTRAVENOUS
Status: CANCELLED | OUTPATIENT
Start: 2021-06-24

## 2021-06-28 LAB
NUDT15 GENOTYPE: NORMAL
NUDT15 PHENOTYPE: NORMAL
TPMT ADDITIONAL INFORMATION: NORMAL
TPMT DISCLAIMER: NORMAL
TPMT GENOTYPE RESULT: NORMAL
TPMT INTERPRETATION: NORMAL
TPMT METHOD: NORMAL
TPMT PHENOTYPE: NORMAL
TPMT REVIEWED BY: NORMAL

## 2021-07-12 ENCOUNTER — LAB VISIT (OUTPATIENT)
Dept: LAB | Facility: HOSPITAL | Age: 46
End: 2021-07-12
Payer: MEDICAID

## 2021-07-12 ENCOUNTER — PATIENT MESSAGE (OUTPATIENT)
Dept: RHEUMATOLOGY | Facility: CLINIC | Age: 46
End: 2021-07-12

## 2021-07-12 DIAGNOSIS — M06.09 RHEUMATOID ARTHRITIS OF MULTIPLE SITES WITH NEGATIVE RHEUMATOID FACTOR: ICD-10-CM

## 2021-07-12 DIAGNOSIS — M31.7 MICROSCOPIC POLYANGIITIS: ICD-10-CM

## 2021-07-12 LAB
CRP SERPL-MCNC: 48 MG/L (ref 0–8.2)
ERYTHROCYTE [SEDIMENTATION RATE] IN BLOOD BY WESTERGREN METHOD: 88 MM/HR (ref 0–36)

## 2021-07-12 PROCEDURE — 36415 COLL VENOUS BLD VENIPUNCTURE: CPT | Performed by: INTERNAL MEDICINE

## 2021-07-12 PROCEDURE — 85652 RBC SED RATE AUTOMATED: CPT | Performed by: INTERNAL MEDICINE

## 2021-07-12 PROCEDURE — 86140 C-REACTIVE PROTEIN: CPT | Performed by: INTERNAL MEDICINE

## 2021-07-13 ENCOUNTER — INFUSION (OUTPATIENT)
Dept: INFUSION THERAPY | Facility: HOSPITAL | Age: 46
End: 2021-07-13
Attending: INTERNAL MEDICINE
Payer: MEDICAID

## 2021-07-13 ENCOUNTER — PATIENT MESSAGE (OUTPATIENT)
Dept: RHEUMATOLOGY | Facility: CLINIC | Age: 46
End: 2021-07-13

## 2021-07-13 ENCOUNTER — TELEPHONE (OUTPATIENT)
Dept: RHEUMATOLOGY | Facility: CLINIC | Age: 46
End: 2021-07-13

## 2021-07-13 VITALS
TEMPERATURE: 98 F | DIASTOLIC BLOOD PRESSURE: 64 MMHG | OXYGEN SATURATION: 96 % | HEART RATE: 71 BPM | SYSTOLIC BLOOD PRESSURE: 100 MMHG | RESPIRATION RATE: 18 BRPM

## 2021-07-13 DIAGNOSIS — M31.7 MICROSCOPIC POLYANGIITIS: ICD-10-CM

## 2021-07-13 DIAGNOSIS — M31.7 MICROSCOPIC POLYANGIITIS: Primary | ICD-10-CM

## 2021-07-13 PROCEDURE — 63600175 PHARM REV CODE 636 W HCPCS: Performed by: INTERNAL MEDICINE

## 2021-07-13 PROCEDURE — 96365 THER/PROPH/DIAG IV INF INIT: CPT

## 2021-07-13 PROCEDURE — 25000003 PHARM REV CODE 250: Performed by: INTERNAL MEDICINE

## 2021-07-13 RX ORDER — HEPARIN 100 UNIT/ML
500 SYRINGE INTRAVENOUS
Status: CANCELLED | OUTPATIENT
Start: 2021-07-13

## 2021-07-13 RX ORDER — AZATHIOPRINE 50 MG/1
50 TABLET ORAL 2 TIMES DAILY
Qty: 60 TABLET | Refills: 1 | Status: SHIPPED | OUTPATIENT
Start: 2021-07-13 | End: 2021-10-05

## 2021-07-13 RX ORDER — SODIUM CHLORIDE 0.9 % (FLUSH) 0.9 %
10 SYRINGE (ML) INJECTION
Status: CANCELLED | OUTPATIENT
Start: 2021-07-13

## 2021-07-13 RX ADMIN — SODIUM CHLORIDE 1000 MG: 9 INJECTION, SOLUTION INTRAVENOUS at 11:07

## 2021-07-20 ENCOUNTER — TELEPHONE (OUTPATIENT)
Dept: RHEUMATOLOGY | Facility: CLINIC | Age: 46
End: 2021-07-20

## 2021-07-20 DIAGNOSIS — M06.09 RHEUMATOID ARTHRITIS OF MULTIPLE SITES WITH NEGATIVE RHEUMATOID FACTOR: Primary | ICD-10-CM

## 2021-07-21 ENCOUNTER — TELEPHONE (OUTPATIENT)
Dept: RHEUMATOLOGY | Facility: CLINIC | Age: 46
End: 2021-07-21

## 2021-07-21 DIAGNOSIS — E87.6 HYPOPOTASSEMIA: Primary | ICD-10-CM

## 2021-07-22 ENCOUNTER — DOCUMENTATION ONLY (OUTPATIENT)
Dept: RHEUMATOLOGY | Facility: CLINIC | Age: 46
End: 2021-07-22

## 2021-08-03 ENCOUNTER — TELEPHONE (OUTPATIENT)
Dept: RHEUMATOLOGY | Facility: CLINIC | Age: 46
End: 2021-08-03

## 2021-08-11 ENCOUNTER — TELEPHONE (OUTPATIENT)
Dept: RHEUMATOLOGY | Facility: CLINIC | Age: 46
End: 2021-08-11

## 2021-08-11 ENCOUNTER — TELEPHONE (OUTPATIENT)
Dept: INFUSION THERAPY | Facility: HOSPITAL | Age: 46
End: 2021-08-11

## 2021-08-11 DIAGNOSIS — M31.7 MICROSCOPIC POLYANGIITIS: Primary | ICD-10-CM

## 2021-08-11 RX ORDER — ACETAMINOPHEN 325 MG/1
650 TABLET ORAL
Status: CANCELLED | OUTPATIENT
Start: 2021-08-11

## 2021-08-11 RX ORDER — METHYLPREDNISOLONE SOD SUCC 125 MG
125 VIAL (EA) INJECTION
Status: CANCELLED | OUTPATIENT
Start: 2021-08-11

## 2021-08-11 RX ORDER — SODIUM CHLORIDE 0.9 % (FLUSH) 0.9 %
10 SYRINGE (ML) INJECTION
Status: CANCELLED | OUTPATIENT
Start: 2021-08-11

## 2021-08-11 RX ORDER — MEPERIDINE HYDROCHLORIDE 50 MG/ML
25 INJECTION INTRAMUSCULAR; INTRAVENOUS; SUBCUTANEOUS
Status: CANCELLED | OUTPATIENT
Start: 2021-08-11

## 2021-08-11 RX ORDER — HEPARIN 100 UNIT/ML
500 SYRINGE INTRAVENOUS
Status: CANCELLED | OUTPATIENT
Start: 2021-08-11

## 2021-08-11 RX ORDER — DIPHENHYDRAMINE HCL 25 MG
25 CAPSULE ORAL
Status: CANCELLED | OUTPATIENT
Start: 2021-08-11

## 2021-08-13 ENCOUNTER — INFUSION (OUTPATIENT)
Dept: INFUSION THERAPY | Facility: HOSPITAL | Age: 46
End: 2021-08-13
Attending: INTERNAL MEDICINE
Payer: MEDICAID

## 2021-08-13 VITALS
OXYGEN SATURATION: 98 % | HEART RATE: 68 BPM | RESPIRATION RATE: 18 BRPM | SYSTOLIC BLOOD PRESSURE: 142 MMHG | DIASTOLIC BLOOD PRESSURE: 96 MMHG | TEMPERATURE: 98 F

## 2021-08-13 DIAGNOSIS — M31.7 MICROSCOPIC POLYANGIITIS: Primary | ICD-10-CM

## 2021-08-13 PROCEDURE — 63600175 PHARM REV CODE 636 W HCPCS: Performed by: INTERNAL MEDICINE

## 2021-08-13 PROCEDURE — 25000003 PHARM REV CODE 250: Performed by: PHYSICIAN ASSISTANT

## 2021-08-13 PROCEDURE — A4216 STERILE WATER/SALINE, 10 ML: HCPCS | Performed by: INTERNAL MEDICINE

## 2021-08-13 PROCEDURE — 25000003 PHARM REV CODE 250: Performed by: INTERNAL MEDICINE

## 2021-08-13 PROCEDURE — 96365 THER/PROPH/DIAG IV INF INIT: CPT

## 2021-08-13 RX ORDER — CLONIDINE HYDROCHLORIDE 0.1 MG/1
0.1 TABLET ORAL
Status: CANCELLED
Start: 2021-08-13

## 2021-08-13 RX ORDER — HYDRALAZINE HYDROCHLORIDE 25 MG/1
25 TABLET, FILM COATED ORAL
Status: COMPLETED | OUTPATIENT
Start: 2021-08-13 | End: 2021-08-13

## 2021-08-13 RX ORDER — DIPHENHYDRAMINE HCL 25 MG
25 CAPSULE ORAL
Status: CANCELLED | OUTPATIENT
Start: 2021-08-14

## 2021-08-13 RX ORDER — ACETAMINOPHEN 325 MG/1
650 TABLET ORAL
Status: CANCELLED | OUTPATIENT
Start: 2021-08-14

## 2021-08-13 RX ORDER — CLONIDINE HYDROCHLORIDE 0.1 MG/1
0.1 TABLET ORAL
Status: CANCELLED
Start: 2021-08-14

## 2021-08-13 RX ORDER — METHYLPREDNISOLONE SOD SUCC 125 MG
125 VIAL (EA) INJECTION
Status: CANCELLED | OUTPATIENT
Start: 2021-08-14

## 2021-08-13 RX ORDER — CLONIDINE HYDROCHLORIDE 0.1 MG/1
0.2 TABLET ORAL
Status: CANCELLED
Start: 2021-08-13

## 2021-08-13 RX ORDER — SODIUM CHLORIDE 0.9 % (FLUSH) 0.9 %
10 SYRINGE (ML) INJECTION
Status: CANCELLED | OUTPATIENT
Start: 2021-08-14

## 2021-08-13 RX ORDER — SODIUM CHLORIDE 0.9 % (FLUSH) 0.9 %
10 SYRINGE (ML) INJECTION
Status: CANCELLED | OUTPATIENT
Start: 2021-08-13

## 2021-08-13 RX ORDER — CLONIDINE HYDROCHLORIDE 0.1 MG/1
0.1 TABLET ORAL
Status: COMPLETED | OUTPATIENT
Start: 2021-08-13 | End: 2021-08-13

## 2021-08-13 RX ORDER — CLONIDINE HYDROCHLORIDE 0.1 MG/1
0.2 TABLET ORAL
Status: CANCELLED
Start: 2021-08-14

## 2021-08-13 RX ORDER — HEPARIN 100 UNIT/ML
500 SYRINGE INTRAVENOUS
Status: CANCELLED | OUTPATIENT
Start: 2021-08-13

## 2021-08-13 RX ORDER — HEPARIN 100 UNIT/ML
500 SYRINGE INTRAVENOUS
Status: CANCELLED | OUTPATIENT
Start: 2021-08-14

## 2021-08-13 RX ORDER — CLONIDINE HYDROCHLORIDE 0.1 MG/1
0.2 TABLET ORAL
Status: COMPLETED | OUTPATIENT
Start: 2021-08-13 | End: 2021-08-13

## 2021-08-13 RX ORDER — SODIUM CHLORIDE 0.9 % (FLUSH) 0.9 %
10 SYRINGE (ML) INJECTION
Status: DISCONTINUED | OUTPATIENT
Start: 2021-08-13 | End: 2021-08-13 | Stop reason: HOSPADM

## 2021-08-13 RX ADMIN — HYDRALAZINE HYDROCHLORIDE 25 MG: 25 TABLET, FILM COATED ORAL at 11:08

## 2021-08-13 RX ADMIN — Medication 10 ML: at 12:08

## 2021-08-13 RX ADMIN — CLONIDINE HYDROCHLORIDE 0.2 MG: 0.1 TABLET ORAL at 11:08

## 2021-08-13 RX ADMIN — SODIUM CHLORIDE 1000 MG: 9 INJECTION, SOLUTION INTRAVENOUS at 01:08

## 2021-08-13 RX ADMIN — CLONIDINE HYDROCHLORIDE 0.1 MG: 0.1 TABLET ORAL at 10:08

## 2021-09-01 ENCOUNTER — INFUSION (OUTPATIENT)
Dept: INFUSION THERAPY | Facility: HOSPITAL | Age: 46
End: 2021-09-01
Attending: INTERNAL MEDICINE
Payer: MEDICAID

## 2021-09-01 VITALS
HEIGHT: 62 IN | HEART RATE: 81 BPM | BODY MASS INDEX: 44.55 KG/M2 | SYSTOLIC BLOOD PRESSURE: 136 MMHG | TEMPERATURE: 98 F | RESPIRATION RATE: 16 BRPM | OXYGEN SATURATION: 98 % | DIASTOLIC BLOOD PRESSURE: 93 MMHG | WEIGHT: 242.06 LBS

## 2021-09-01 DIAGNOSIS — M31.7 MICROSCOPIC POLYANGIITIS: Primary | ICD-10-CM

## 2021-09-01 PROCEDURE — 63600175 PHARM REV CODE 636 W HCPCS: Mod: JW,JG | Performed by: INTERNAL MEDICINE

## 2021-09-01 PROCEDURE — 96375 TX/PRO/DX INJ NEW DRUG ADDON: CPT

## 2021-09-01 PROCEDURE — 25000003 PHARM REV CODE 250: Performed by: INTERNAL MEDICINE

## 2021-09-01 PROCEDURE — 96415 CHEMO IV INFUSION ADDL HR: CPT

## 2021-09-01 PROCEDURE — 96413 CHEMO IV INFUSION 1 HR: CPT

## 2021-09-01 RX ORDER — DIPHENHYDRAMINE HYDROCHLORIDE 50 MG/ML
25 INJECTION INTRAMUSCULAR; INTRAVENOUS
Status: CANCELLED
Start: 2021-09-02

## 2021-09-01 RX ORDER — METHYLPREDNISOLONE SOD SUCC 125 MG
125 VIAL (EA) INJECTION
Status: CANCELLED | OUTPATIENT
Start: 2021-09-02

## 2021-09-01 RX ORDER — SODIUM CHLORIDE 0.9 % (FLUSH) 0.9 %
10 SYRINGE (ML) INJECTION
Status: DISCONTINUED | OUTPATIENT
Start: 2021-09-01 | End: 2021-09-01 | Stop reason: HOSPADM

## 2021-09-01 RX ORDER — ACETAMINOPHEN 325 MG/1
650 TABLET ORAL
Status: CANCELLED | OUTPATIENT
Start: 2021-09-02

## 2021-09-01 RX ORDER — HEPARIN 100 UNIT/ML
500 SYRINGE INTRAVENOUS
Status: CANCELLED | OUTPATIENT
Start: 2021-09-02

## 2021-09-01 RX ORDER — DIPHENHYDRAMINE HYDROCHLORIDE 50 MG/ML
25 INJECTION INTRAMUSCULAR; INTRAVENOUS
Status: COMPLETED | OUTPATIENT
Start: 2021-09-01 | End: 2021-09-01

## 2021-09-01 RX ORDER — DIPHENHYDRAMINE HCL 25 MG
25 CAPSULE ORAL
Status: CANCELLED | OUTPATIENT
Start: 2021-09-02

## 2021-09-01 RX ORDER — ACETAMINOPHEN 325 MG/1
650 TABLET ORAL
Status: COMPLETED | OUTPATIENT
Start: 2021-09-01 | End: 2021-09-01

## 2021-09-01 RX ORDER — DIPHENHYDRAMINE HCL 25 MG
25 CAPSULE ORAL
Status: DISCONTINUED | OUTPATIENT
Start: 2021-09-01 | End: 2021-09-01

## 2021-09-01 RX ORDER — SODIUM CHLORIDE 0.9 % (FLUSH) 0.9 %
10 SYRINGE (ML) INJECTION
Status: CANCELLED | OUTPATIENT
Start: 2021-09-02

## 2021-09-01 RX ORDER — METHYLPREDNISOLONE SOD SUCC 125 MG
125 VIAL (EA) INJECTION
Status: COMPLETED | OUTPATIENT
Start: 2021-09-01 | End: 2021-09-01

## 2021-09-01 RX ADMIN — METHYLPREDNISOLONE SODIUM SUCCINATE 125 MG: 125 INJECTION, POWDER, FOR SOLUTION INTRAMUSCULAR; INTRAVENOUS at 10:09

## 2021-09-01 RX ADMIN — DIPHENHYDRAMINE HYDROCHLORIDE 25 MG: 50 INJECTION INTRAMUSCULAR; INTRAVENOUS at 10:09

## 2021-09-01 RX ADMIN — ACETAMINOPHEN 650 MG: 325 TABLET ORAL at 10:09

## 2021-09-01 RX ADMIN — RITUXIMAB 821 MG: 10 INJECTION, SOLUTION INTRAVENOUS at 10:09

## 2021-09-08 ENCOUNTER — INFUSION (OUTPATIENT)
Dept: INFUSION THERAPY | Facility: HOSPITAL | Age: 46
End: 2021-09-08
Attending: INTERNAL MEDICINE
Payer: MEDICAID

## 2021-09-08 VITALS
SYSTOLIC BLOOD PRESSURE: 129 MMHG | DIASTOLIC BLOOD PRESSURE: 85 MMHG | OXYGEN SATURATION: 99 % | TEMPERATURE: 98 F | RESPIRATION RATE: 18 BRPM | HEART RATE: 72 BPM

## 2021-09-08 DIAGNOSIS — M31.7 MICROSCOPIC POLYANGIITIS: Primary | ICD-10-CM

## 2021-09-08 PROCEDURE — 96375 TX/PRO/DX INJ NEW DRUG ADDON: CPT

## 2021-09-08 PROCEDURE — 96366 THER/PROPH/DIAG IV INF ADDON: CPT

## 2021-09-08 PROCEDURE — 63600175 PHARM REV CODE 636 W HCPCS: Performed by: INTERNAL MEDICINE

## 2021-09-08 PROCEDURE — 96413 CHEMO IV INFUSION 1 HR: CPT

## 2021-09-08 PROCEDURE — 96365 THER/PROPH/DIAG IV INF INIT: CPT

## 2021-09-08 PROCEDURE — 96415 CHEMO IV INFUSION ADDL HR: CPT

## 2021-09-08 PROCEDURE — 25000003 PHARM REV CODE 250: Performed by: INTERNAL MEDICINE

## 2021-09-08 RX ORDER — DIPHENHYDRAMINE HYDROCHLORIDE 50 MG/ML
25 INJECTION INTRAMUSCULAR; INTRAVENOUS
Status: CANCELLED
Start: 2021-09-09

## 2021-09-08 RX ORDER — ACETAMINOPHEN 325 MG/1
650 TABLET ORAL
Status: COMPLETED | OUTPATIENT
Start: 2021-09-08 | End: 2021-09-08

## 2021-09-08 RX ORDER — DIPHENHYDRAMINE HYDROCHLORIDE 50 MG/ML
25 INJECTION INTRAMUSCULAR; INTRAVENOUS
Status: COMPLETED | OUTPATIENT
Start: 2021-09-08 | End: 2021-09-08

## 2021-09-08 RX ORDER — METHYLPREDNISOLONE SOD SUCC 125 MG
125 VIAL (EA) INJECTION
Status: COMPLETED | OUTPATIENT
Start: 2021-09-08 | End: 2021-09-08

## 2021-09-08 RX ORDER — METHYLPREDNISOLONE SOD SUCC 125 MG
125 VIAL (EA) INJECTION
Status: CANCELLED | OUTPATIENT
Start: 2021-09-09

## 2021-09-08 RX ORDER — ACETAMINOPHEN 325 MG/1
650 TABLET ORAL
Status: CANCELLED | OUTPATIENT
Start: 2021-09-09

## 2021-09-08 RX ORDER — HEPARIN 100 UNIT/ML
500 SYRINGE INTRAVENOUS
Status: CANCELLED | OUTPATIENT
Start: 2021-09-09

## 2021-09-08 RX ORDER — SODIUM CHLORIDE 0.9 % (FLUSH) 0.9 %
10 SYRINGE (ML) INJECTION
Status: CANCELLED | OUTPATIENT
Start: 2021-09-09

## 2021-09-08 RX ADMIN — ACETAMINOPHEN 650 MG: 325 TABLET ORAL at 09:09

## 2021-09-08 RX ADMIN — DIPHENHYDRAMINE HYDROCHLORIDE 25 MG: 50 INJECTION INTRAMUSCULAR; INTRAVENOUS at 09:09

## 2021-09-08 RX ADMIN — RITUXIMAB 821 MG: 10 INJECTION, SOLUTION INTRAVENOUS at 10:09

## 2021-09-08 RX ADMIN — METHYLPREDNISOLONE SODIUM SUCCINATE 125 MG: 125 INJECTION, POWDER, FOR SOLUTION INTRAMUSCULAR; INTRAVENOUS at 09:09

## 2021-09-15 ENCOUNTER — INFUSION (OUTPATIENT)
Dept: INFUSION THERAPY | Facility: HOSPITAL | Age: 46
End: 2021-09-15
Attending: INTERNAL MEDICINE
Payer: MEDICAID

## 2021-09-15 VITALS
BODY MASS INDEX: 42.88 KG/M2 | HEART RATE: 70 BPM | HEIGHT: 62 IN | TEMPERATURE: 98 F | SYSTOLIC BLOOD PRESSURE: 105 MMHG | RESPIRATION RATE: 18 BRPM | WEIGHT: 233 LBS | OXYGEN SATURATION: 99 % | DIASTOLIC BLOOD PRESSURE: 68 MMHG

## 2021-09-15 DIAGNOSIS — M31.7 MICROSCOPIC POLYANGIITIS: Primary | ICD-10-CM

## 2021-09-15 PROCEDURE — 96413 CHEMO IV INFUSION 1 HR: CPT

## 2021-09-15 PROCEDURE — 96375 TX/PRO/DX INJ NEW DRUG ADDON: CPT

## 2021-09-15 PROCEDURE — 25000003 PHARM REV CODE 250: Performed by: INTERNAL MEDICINE

## 2021-09-15 PROCEDURE — 96415 CHEMO IV INFUSION ADDL HR: CPT

## 2021-09-15 PROCEDURE — 63600175 PHARM REV CODE 636 W HCPCS: Mod: JG | Performed by: INTERNAL MEDICINE

## 2021-09-15 RX ORDER — METHYLPREDNISOLONE SOD SUCC 125 MG
125 VIAL (EA) INJECTION
Status: CANCELLED | OUTPATIENT
Start: 2021-09-22

## 2021-09-15 RX ORDER — ACETAMINOPHEN 325 MG/1
650 TABLET ORAL
Status: COMPLETED | OUTPATIENT
Start: 2021-09-15 | End: 2021-09-15

## 2021-09-15 RX ORDER — ACETAMINOPHEN 325 MG/1
650 TABLET ORAL
Status: CANCELLED | OUTPATIENT
Start: 2021-09-22

## 2021-09-15 RX ORDER — HEPARIN 100 UNIT/ML
500 SYRINGE INTRAVENOUS
Status: CANCELLED | OUTPATIENT
Start: 2021-09-22

## 2021-09-15 RX ORDER — DIPHENHYDRAMINE HYDROCHLORIDE 50 MG/ML
25 INJECTION INTRAMUSCULAR; INTRAVENOUS
Status: CANCELLED
Start: 2021-09-22

## 2021-09-15 RX ORDER — SODIUM CHLORIDE 0.9 % (FLUSH) 0.9 %
10 SYRINGE (ML) INJECTION
Status: CANCELLED | OUTPATIENT
Start: 2021-09-22

## 2021-09-15 RX ORDER — DIPHENHYDRAMINE HYDROCHLORIDE 50 MG/ML
25 INJECTION INTRAMUSCULAR; INTRAVENOUS
Status: COMPLETED | OUTPATIENT
Start: 2021-09-15 | End: 2021-09-15

## 2021-09-15 RX ORDER — METHYLPREDNISOLONE SOD SUCC 125 MG
125 VIAL (EA) INJECTION
Status: COMPLETED | OUTPATIENT
Start: 2021-09-15 | End: 2021-09-15

## 2021-09-15 RX ADMIN — SODIUM CHLORIDE: 0.9 INJECTION, SOLUTION INTRAVENOUS at 09:09

## 2021-09-15 RX ADMIN — DIPHENHYDRAMINE HYDROCHLORIDE 25 MG: 50 INJECTION INTRAMUSCULAR; INTRAVENOUS at 09:09

## 2021-09-15 RX ADMIN — METHYLPREDNISOLONE SODIUM SUCCINATE 125 MG: 125 INJECTION, POWDER, FOR SOLUTION INTRAMUSCULAR; INTRAVENOUS at 09:09

## 2021-09-15 RX ADMIN — ACETAMINOPHEN 650 MG: 325 TABLET ORAL at 09:09

## 2021-09-15 RX ADMIN — RITUXIMAB 806 MG: 10 INJECTION, SOLUTION INTRAVENOUS at 09:09

## 2021-09-22 ENCOUNTER — INFUSION (OUTPATIENT)
Dept: INFUSION THERAPY | Facility: HOSPITAL | Age: 46
End: 2021-09-22
Attending: INTERNAL MEDICINE
Payer: MEDICAID

## 2021-09-22 VITALS
WEIGHT: 235.88 LBS | HEIGHT: 62 IN | OXYGEN SATURATION: 100 % | RESPIRATION RATE: 18 BRPM | BODY MASS INDEX: 43.41 KG/M2 | HEART RATE: 60 BPM | TEMPERATURE: 97 F | SYSTOLIC BLOOD PRESSURE: 108 MMHG | DIASTOLIC BLOOD PRESSURE: 72 MMHG

## 2021-09-22 DIAGNOSIS — M31.7 MICROSCOPIC POLYANGIITIS: Primary | ICD-10-CM

## 2021-09-22 PROCEDURE — 63600175 PHARM REV CODE 636 W HCPCS: Performed by: INTERNAL MEDICINE

## 2021-09-22 PROCEDURE — 96413 CHEMO IV INFUSION 1 HR: CPT

## 2021-09-22 PROCEDURE — 25000003 PHARM REV CODE 250: Performed by: INTERNAL MEDICINE

## 2021-09-22 PROCEDURE — 96375 TX/PRO/DX INJ NEW DRUG ADDON: CPT

## 2021-09-22 PROCEDURE — 96415 CHEMO IV INFUSION ADDL HR: CPT

## 2021-09-22 RX ORDER — SODIUM CHLORIDE 0.9 % (FLUSH) 0.9 %
10 SYRINGE (ML) INJECTION
Status: CANCELLED | OUTPATIENT
Start: 2021-09-22

## 2021-09-22 RX ORDER — ACETAMINOPHEN 325 MG/1
650 TABLET ORAL
Status: COMPLETED | OUTPATIENT
Start: 2021-09-22 | End: 2021-09-22

## 2021-09-22 RX ORDER — DIPHENHYDRAMINE HYDROCHLORIDE 50 MG/ML
25 INJECTION INTRAMUSCULAR; INTRAVENOUS
Status: COMPLETED | OUTPATIENT
Start: 2021-09-22 | End: 2021-09-22

## 2021-09-22 RX ORDER — DIPHENHYDRAMINE HYDROCHLORIDE 50 MG/ML
25 INJECTION INTRAMUSCULAR; INTRAVENOUS
Status: CANCELLED
Start: 2021-09-22

## 2021-09-22 RX ORDER — ACETAMINOPHEN 325 MG/1
650 TABLET ORAL
Status: CANCELLED | OUTPATIENT
Start: 2021-09-22

## 2021-09-22 RX ORDER — HEPARIN 100 UNIT/ML
500 SYRINGE INTRAVENOUS
Status: CANCELLED | OUTPATIENT
Start: 2021-09-22

## 2021-09-22 RX ADMIN — DIPHENHYDRAMINE HYDROCHLORIDE 25 MG: 50 INJECTION INTRAMUSCULAR; INTRAVENOUS at 09:09

## 2021-09-22 RX ADMIN — ACETAMINOPHEN 650 MG: 325 TABLET ORAL at 09:09

## 2021-09-22 RX ADMIN — RITUXIMAB 810 MG: 10 INJECTION, SOLUTION INTRAVENOUS at 09:09

## 2021-10-01 ENCOUNTER — LAB VISIT (OUTPATIENT)
Dept: LAB | Facility: HOSPITAL | Age: 46
End: 2021-10-01
Attending: FAMILY MEDICINE
Payer: MEDICAID

## 2021-10-01 DIAGNOSIS — M31.7 MICROSCOPIC POLYANGIITIS: ICD-10-CM

## 2021-10-01 DIAGNOSIS — M06.09 RHEUMATOID ARTHRITIS OF MULTIPLE SITES WITH NEGATIVE RHEUMATOID FACTOR: ICD-10-CM

## 2021-10-01 LAB
ALBUMIN SERPL BCP-MCNC: 3.5 G/DL (ref 3.5–5.2)
ALP SERPL-CCNC: 60 U/L (ref 55–135)
ALT SERPL W/O P-5'-P-CCNC: 13 U/L (ref 10–44)
ANION GAP SERPL CALC-SCNC: 11 MMOL/L (ref 8–16)
AST SERPL-CCNC: 14 U/L (ref 10–40)
BASOPHILS # BLD AUTO: 0.02 K/UL (ref 0–0.2)
BASOPHILS NFR BLD: 0.5 % (ref 0–1.9)
BILIRUB SERPL-MCNC: 0.4 MG/DL (ref 0.1–1)
BUN SERPL-MCNC: 10 MG/DL (ref 6–20)
CALCIUM SERPL-MCNC: 8.4 MG/DL (ref 8.7–10.5)
CHLORIDE SERPL-SCNC: 103 MMOL/L (ref 95–110)
CK SERPL-CCNC: 46 U/L (ref 20–180)
CO2 SERPL-SCNC: 28 MMOL/L (ref 23–29)
CREAT SERPL-MCNC: 1.2 MG/DL (ref 0.5–1.4)
CRP SERPL-MCNC: 12.9 MG/L (ref 0–8.2)
DIFFERENTIAL METHOD: ABNORMAL
EOSINOPHIL # BLD AUTO: 0.1 K/UL (ref 0–0.5)
EOSINOPHIL NFR BLD: 3.1 % (ref 0–8)
ERYTHROCYTE [DISTWIDTH] IN BLOOD BY AUTOMATED COUNT: 13.8 % (ref 11.5–14.5)
ERYTHROCYTE [SEDIMENTATION RATE] IN BLOOD BY WESTERGREN METHOD: 70 MM/HR (ref 0–36)
EST. GFR  (AFRICAN AMERICAN): >60 ML/MIN/1.73 M^2
EST. GFR  (NON AFRICAN AMERICAN): 54 ML/MIN/1.73 M^2
GLUCOSE SERPL-MCNC: 144 MG/DL (ref 70–110)
HCT VFR BLD AUTO: 34.9 % (ref 37–48.5)
HGB BLD-MCNC: 11.1 G/DL (ref 12–16)
IMM GRANULOCYTES # BLD AUTO: 0.01 K/UL (ref 0–0.04)
IMM GRANULOCYTES NFR BLD AUTO: 0.2 % (ref 0–0.5)
LYMPHOCYTES # BLD AUTO: 1.6 K/UL (ref 1–4.8)
LYMPHOCYTES NFR BLD: 37.4 % (ref 18–48)
MCH RBC QN AUTO: 29 PG (ref 27–31)
MCHC RBC AUTO-ENTMCNC: 31.8 G/DL (ref 32–36)
MCV RBC AUTO: 91 FL (ref 82–98)
MONOCYTES # BLD AUTO: 0.6 K/UL (ref 0.3–1)
MONOCYTES NFR BLD: 14.1 % (ref 4–15)
NEUTROPHILS # BLD AUTO: 1.9 K/UL (ref 1.8–7.7)
NEUTROPHILS NFR BLD: 44.7 % (ref 38–73)
NRBC BLD-RTO: 0 /100 WBC
PLATELET # BLD AUTO: 267 K/UL (ref 150–450)
PMV BLD AUTO: 11 FL (ref 9.2–12.9)
POTASSIUM SERPL-SCNC: 3.3 MMOL/L (ref 3.5–5.1)
PROT SERPL-MCNC: 7.4 G/DL (ref 6–8.4)
RBC # BLD AUTO: 3.83 M/UL (ref 4–5.4)
SODIUM SERPL-SCNC: 142 MMOL/L (ref 136–145)
WBC # BLD AUTO: 4.25 K/UL (ref 3.9–12.7)

## 2021-10-01 PROCEDURE — 85652 RBC SED RATE AUTOMATED: CPT | Performed by: INTERNAL MEDICINE

## 2021-10-01 PROCEDURE — 36415 COLL VENOUS BLD VENIPUNCTURE: CPT | Performed by: INTERNAL MEDICINE

## 2021-10-01 PROCEDURE — 85025 COMPLETE CBC W/AUTO DIFF WBC: CPT | Performed by: INTERNAL MEDICINE

## 2021-10-01 PROCEDURE — 80053 COMPREHEN METABOLIC PANEL: CPT | Performed by: INTERNAL MEDICINE

## 2021-10-01 PROCEDURE — 82550 ASSAY OF CK (CPK): CPT | Performed by: INTERNAL MEDICINE

## 2021-10-01 PROCEDURE — 86140 C-REACTIVE PROTEIN: CPT | Performed by: INTERNAL MEDICINE

## 2021-10-04 ENCOUNTER — TELEPHONE (OUTPATIENT)
Dept: RHEUMATOLOGY | Facility: CLINIC | Age: 46
End: 2021-10-04

## 2021-10-05 ENCOUNTER — OFFICE VISIT (OUTPATIENT)
Dept: RHEUMATOLOGY | Facility: CLINIC | Age: 46
End: 2021-10-05
Payer: MEDICAID

## 2021-10-05 VITALS
WEIGHT: 234.38 LBS | DIASTOLIC BLOOD PRESSURE: 100 MMHG | SYSTOLIC BLOOD PRESSURE: 131 MMHG | BODY MASS INDEX: 43.13 KG/M2 | HEART RATE: 66 BPM | HEIGHT: 62 IN

## 2021-10-05 DIAGNOSIS — D84.9 IMMUNOCOMPROMISED: ICD-10-CM

## 2021-10-05 DIAGNOSIS — Z79.899 LONG TERM CURRENT USE OF IMMUNOSUPPRESSIVE DRUG: ICD-10-CM

## 2021-10-05 DIAGNOSIS — M31.7 MICROSCOPIC POLYANGIITIS: Primary | ICD-10-CM

## 2021-10-05 DIAGNOSIS — M79.7 FIBROMYALGIA: ICD-10-CM

## 2021-10-05 PROCEDURE — 99214 OFFICE O/P EST MOD 30 MIN: CPT | Mod: PBBFAC | Performed by: INTERNAL MEDICINE

## 2021-10-05 PROCEDURE — 99215 OFFICE O/P EST HI 40 MIN: CPT | Mod: S$PBB,,, | Performed by: INTERNAL MEDICINE

## 2021-10-05 PROCEDURE — 99999 PR PBB SHADOW E&M-EST. PATIENT-LVL IV: ICD-10-PCS | Mod: PBBFAC,,, | Performed by: INTERNAL MEDICINE

## 2021-10-05 PROCEDURE — 99215 PR OFFICE/OUTPT VISIT, EST, LEVL V, 40-54 MIN: ICD-10-PCS | Mod: S$PBB,,, | Performed by: INTERNAL MEDICINE

## 2021-10-05 PROCEDURE — 99999 PR PBB SHADOW E&M-EST. PATIENT-LVL IV: CPT | Mod: PBBFAC,,, | Performed by: INTERNAL MEDICINE

## 2021-10-05 RX ORDER — PREDNISONE 5 MG/1
TABLET ORAL
Qty: 135 TABLET | Refills: 0 | Status: SHIPPED | OUTPATIENT
Start: 2021-10-05 | End: 2021-12-14

## 2021-10-05 RX ORDER — DULOXETIN HYDROCHLORIDE 20 MG/1
20 CAPSULE, DELAYED RELEASE ORAL 2 TIMES DAILY
COMMUNITY
Start: 2021-09-09 | End: 2022-04-27

## 2021-10-05 RX ORDER — AZATHIOPRINE 50 MG/1
100 TABLET ORAL 2 TIMES DAILY
Qty: 120 TABLET | Refills: 1 | Status: SHIPPED | OUTPATIENT
Start: 2021-10-05 | End: 2022-01-25

## 2021-10-05 RX ORDER — TEMAZEPAM 15 MG/1
30 CAPSULE ORAL
COMMUNITY
Start: 2021-09-10

## 2021-10-05 RX ORDER — PREGABALIN 100 MG/1
100 CAPSULE ORAL 2 TIMES DAILY
Qty: 60 CAPSULE | Refills: 3 | Status: SHIPPED | OUTPATIENT
Start: 2021-10-05 | End: 2022-01-25

## 2021-10-05 ASSESSMENT — ROUTINE ASSESSMENT OF PATIENT INDEX DATA (RAPID3): MDHAQ FUNCTION SCORE: 1.1

## 2021-10-06 ENCOUNTER — DOCUMENTATION ONLY (OUTPATIENT)
Dept: RHEUMATOLOGY | Facility: CLINIC | Age: 46
End: 2021-10-06

## 2021-10-11 ENCOUNTER — TELEPHONE (OUTPATIENT)
Dept: RHEUMATOLOGY | Facility: CLINIC | Age: 46
End: 2021-10-11

## 2021-12-01 ENCOUNTER — LAB VISIT (OUTPATIENT)
Dept: LAB | Facility: HOSPITAL | Age: 46
End: 2021-12-01
Attending: FAMILY MEDICINE
Payer: MEDICAID

## 2021-12-01 DIAGNOSIS — D84.9 IMMUNOCOMPROMISED: ICD-10-CM

## 2021-12-01 DIAGNOSIS — Z79.899 LONG TERM CURRENT USE OF IMMUNOSUPPRESSIVE DRUG: ICD-10-CM

## 2021-12-01 DIAGNOSIS — M31.7 MICROSCOPIC POLYANGIITIS: ICD-10-CM

## 2021-12-01 DIAGNOSIS — Z22.7 TB LUNG, LATENT: ICD-10-CM

## 2021-12-01 LAB
ALBUMIN SERPL BCP-MCNC: 3.6 G/DL (ref 3.5–5.2)
ALP SERPL-CCNC: 87 U/L (ref 55–135)
ALT SERPL W/O P-5'-P-CCNC: 17 U/L (ref 10–44)
ANION GAP SERPL CALC-SCNC: 11 MMOL/L (ref 8–16)
AST SERPL-CCNC: 22 U/L (ref 10–40)
BASOPHILS # BLD AUTO: 0.03 K/UL (ref 0–0.2)
BASOPHILS NFR BLD: 0.6 % (ref 0–1.9)
BILIRUB SERPL-MCNC: 0.3 MG/DL (ref 0.1–1)
BUN SERPL-MCNC: 12 MG/DL (ref 6–20)
CALCIUM SERPL-MCNC: 8.8 MG/DL (ref 8.7–10.5)
CHLORIDE SERPL-SCNC: 101 MMOL/L (ref 95–110)
CO2 SERPL-SCNC: 29 MMOL/L (ref 23–29)
CREAT SERPL-MCNC: 0.9 MG/DL (ref 0.5–1.4)
CRP SERPL-MCNC: 25.5 MG/L (ref 0–8.2)
DIFFERENTIAL METHOD: ABNORMAL
EOSINOPHIL # BLD AUTO: 0.1 K/UL (ref 0–0.5)
EOSINOPHIL NFR BLD: 2.9 % (ref 0–8)
ERYTHROCYTE [DISTWIDTH] IN BLOOD BY AUTOMATED COUNT: 13.4 % (ref 11.5–14.5)
ERYTHROCYTE [SEDIMENTATION RATE] IN BLOOD BY WESTERGREN METHOD: 92 MM/HR (ref 0–36)
EST. GFR  (AFRICAN AMERICAN): >60 ML/MIN/1.73 M^2
EST. GFR  (NON AFRICAN AMERICAN): >60 ML/MIN/1.73 M^2
GLUCOSE SERPL-MCNC: 103 MG/DL (ref 70–110)
HCT VFR BLD AUTO: 35.1 % (ref 37–48.5)
HGB BLD-MCNC: 11.1 G/DL (ref 12–16)
IMM GRANULOCYTES # BLD AUTO: 0.01 K/UL (ref 0–0.04)
IMM GRANULOCYTES NFR BLD AUTO: 0.2 % (ref 0–0.5)
LYMPHOCYTES # BLD AUTO: 2 K/UL (ref 1–4.8)
LYMPHOCYTES NFR BLD: 41.4 % (ref 18–48)
MCH RBC QN AUTO: 28.6 PG (ref 27–31)
MCHC RBC AUTO-ENTMCNC: 31.6 G/DL (ref 32–36)
MCV RBC AUTO: 91 FL (ref 82–98)
MONOCYTES # BLD AUTO: 0.8 K/UL (ref 0.3–1)
MONOCYTES NFR BLD: 16 % (ref 4–15)
NEUTROPHILS # BLD AUTO: 1.9 K/UL (ref 1.8–7.7)
NEUTROPHILS NFR BLD: 38.9 % (ref 38–73)
NRBC BLD-RTO: 0 /100 WBC
PLATELET # BLD AUTO: 280 K/UL (ref 150–450)
PMV BLD AUTO: 11 FL (ref 9.2–12.9)
POTASSIUM SERPL-SCNC: 3.6 MMOL/L (ref 3.5–5.1)
PROT SERPL-MCNC: 7 G/DL (ref 6–8.4)
RBC # BLD AUTO: 3.88 M/UL (ref 4–5.4)
SODIUM SERPL-SCNC: 141 MMOL/L (ref 136–145)
WBC # BLD AUTO: 4.76 K/UL (ref 3.9–12.7)

## 2021-12-01 PROCEDURE — 86140 C-REACTIVE PROTEIN: CPT | Performed by: INTERNAL MEDICINE

## 2021-12-01 PROCEDURE — 85652 RBC SED RATE AUTOMATED: CPT | Performed by: INTERNAL MEDICINE

## 2021-12-01 PROCEDURE — 85025 COMPLETE CBC W/AUTO DIFF WBC: CPT | Performed by: INTERNAL MEDICINE

## 2021-12-01 PROCEDURE — 80053 COMPREHEN METABOLIC PANEL: CPT | Performed by: INTERNAL MEDICINE

## 2021-12-01 PROCEDURE — 36415 COLL VENOUS BLD VENIPUNCTURE: CPT | Performed by: INTERNAL MEDICINE

## 2021-12-14 ENCOUNTER — PATIENT MESSAGE (OUTPATIENT)
Dept: RHEUMATOLOGY | Facility: CLINIC | Age: 46
End: 2021-12-14
Payer: MEDICAID

## 2021-12-14 DIAGNOSIS — M31.7 MICROSCOPIC POLYANGIITIS: ICD-10-CM

## 2021-12-14 RX ORDER — PREDNISONE 5 MG/1
10 TABLET ORAL DAILY
Qty: 60 TABLET | Refills: 0 | Status: SHIPPED | OUTPATIENT
Start: 2021-12-14 | End: 2022-01-25

## 2021-12-15 ENCOUNTER — LAB VISIT (OUTPATIENT)
Dept: LAB | Facility: HOSPITAL | Age: 46
End: 2021-12-15
Attending: FAMILY MEDICINE
Payer: MEDICAID

## 2021-12-15 DIAGNOSIS — M31.7 MICROSCOPIC POLYANGIITIS: ICD-10-CM

## 2021-12-15 LAB
IGA SERPL-MCNC: 387 MG/DL (ref 40–350)
IGG SERPL-MCNC: 1096 MG/DL (ref 650–1600)
IGM SERPL-MCNC: 78 MG/DL (ref 50–300)

## 2021-12-15 PROCEDURE — 86334 IMMUNOFIX E-PHORESIS SERUM: CPT | Performed by: INTERNAL MEDICINE

## 2021-12-15 PROCEDURE — 80299 QUANTITATIVE ASSAY DRUG: CPT | Performed by: INTERNAL MEDICINE

## 2021-12-15 PROCEDURE — 84165 PATHOLOGIST INTERPRETATION SPE: ICD-10-PCS | Mod: 26,,, | Performed by: PATHOLOGY

## 2021-12-15 PROCEDURE — 86334 PATHOLOGIST INTERPRETATION IFE: ICD-10-PCS | Mod: 26,,, | Performed by: PATHOLOGY

## 2021-12-15 PROCEDURE — 84165 PROTEIN E-PHORESIS SERUM: CPT | Mod: 26,,, | Performed by: PATHOLOGY

## 2021-12-15 PROCEDURE — 84165 PROTEIN E-PHORESIS SERUM: CPT | Performed by: INTERNAL MEDICINE

## 2021-12-15 PROCEDURE — 86334 IMMUNOFIX E-PHORESIS SERUM: CPT | Mod: 26,,, | Performed by: PATHOLOGY

## 2021-12-15 PROCEDURE — 88184 FLOWCYTOMETRY/ TC 1 MARKER: CPT | Performed by: INTERNAL MEDICINE

## 2021-12-15 PROCEDURE — 82784 ASSAY IGA/IGD/IGG/IGM EACH: CPT | Performed by: INTERNAL MEDICINE

## 2021-12-16 LAB
ALBUMIN SERPL ELPH-MCNC: 3.76 G/DL (ref 3.35–5.55)
ALPHA1 GLOB SERPL ELPH-MCNC: 0.37 G/DL (ref 0.17–0.41)
ALPHA2 GLOB SERPL ELPH-MCNC: 0.88 G/DL (ref 0.43–0.99)
B-GLOBULIN SERPL ELPH-MCNC: 0.99 G/DL (ref 0.5–1.1)
GAMMA GLOB SERPL ELPH-MCNC: 1.1 G/DL (ref 0.67–1.58)
INTERPRETATION SERPL IFE-IMP: NORMAL
PATHOLOGIST INTERPRETATION IFE: NORMAL
PATHOLOGIST INTERPRETATION SPE: NORMAL
PROT SERPL-MCNC: 7.1 G/DL (ref 6–8.4)

## 2021-12-18 LAB
6-TGN ENTSUB RBC: 73 PMOL/8X10(8)RBC (ref 235–450)
6MMP ENTSUB RBC: <356 PMOL/8X10(8)RBC

## 2021-12-21 LAB
PATH REPORT.FINAL DX SPEC: NORMAL
RITUXAN SENSITIVITY (CD20): NORMAL

## 2021-12-23 ENCOUNTER — PATIENT MESSAGE (OUTPATIENT)
Dept: RHEUMATOLOGY | Facility: CLINIC | Age: 46
End: 2021-12-23
Payer: MEDICAID

## 2022-01-25 ENCOUNTER — LAB VISIT (OUTPATIENT)
Dept: LAB | Facility: HOSPITAL | Age: 47
End: 2022-01-25
Attending: INTERNAL MEDICINE
Payer: MEDICAID

## 2022-01-25 ENCOUNTER — OFFICE VISIT (OUTPATIENT)
Dept: RHEUMATOLOGY | Facility: CLINIC | Age: 47
End: 2022-01-25
Payer: MEDICAID

## 2022-01-25 ENCOUNTER — TELEPHONE (OUTPATIENT)
Dept: INFUSION THERAPY | Facility: HOSPITAL | Age: 47
End: 2022-01-25
Payer: MEDICAID

## 2022-01-25 VITALS
BODY MASS INDEX: 43.29 KG/M2 | WEIGHT: 235.25 LBS | HEART RATE: 70 BPM | SYSTOLIC BLOOD PRESSURE: 188 MMHG | HEIGHT: 62 IN | DIASTOLIC BLOOD PRESSURE: 119 MMHG

## 2022-01-25 DIAGNOSIS — M31.7 MICROSCOPIC POLYANGIITIS: ICD-10-CM

## 2022-01-25 DIAGNOSIS — M31.7 MICROSCOPIC POLYANGIITIS: Primary | ICD-10-CM

## 2022-01-25 DIAGNOSIS — Z79.899 LONG TERM CURRENT USE OF IMMUNOSUPPRESSIVE DRUG: ICD-10-CM

## 2022-01-25 DIAGNOSIS — M06.09 RHEUMATOID ARTHRITIS OF MULTIPLE SITES WITH NEGATIVE RHEUMATOID FACTOR: ICD-10-CM

## 2022-01-25 DIAGNOSIS — M79.7 FIBROMYALGIA: ICD-10-CM

## 2022-01-25 DIAGNOSIS — D84.9 IMMUNOCOMPROMISED: ICD-10-CM

## 2022-01-25 LAB
ALBUMIN SERPL BCP-MCNC: 3.6 G/DL (ref 3.5–5.2)
ALP SERPL-CCNC: 100 U/L (ref 55–135)
ALT SERPL W/O P-5'-P-CCNC: 18 U/L (ref 10–44)
ANION GAP SERPL CALC-SCNC: 9 MMOL/L (ref 8–16)
AST SERPL-CCNC: 16 U/L (ref 10–40)
BASOPHILS # BLD AUTO: 0.05 K/UL (ref 0–0.2)
BASOPHILS NFR BLD: 0.5 % (ref 0–1.9)
BILIRUB SERPL-MCNC: 0.2 MG/DL (ref 0.1–1)
BUN SERPL-MCNC: 15 MG/DL (ref 6–20)
CALCIUM SERPL-MCNC: 9.2 MG/DL (ref 8.7–10.5)
CHLORIDE SERPL-SCNC: 103 MMOL/L (ref 95–110)
CO2 SERPL-SCNC: 32 MMOL/L (ref 23–29)
CREAT SERPL-MCNC: 1.1 MG/DL (ref 0.5–1.4)
CRP SERPL-MCNC: 20.2 MG/L (ref 0–8.2)
DIFFERENTIAL METHOD: ABNORMAL
EOSINOPHIL # BLD AUTO: 0 K/UL (ref 0–0.5)
EOSINOPHIL NFR BLD: 0.4 % (ref 0–8)
ERYTHROCYTE [DISTWIDTH] IN BLOOD BY AUTOMATED COUNT: 13.8 % (ref 11.5–14.5)
EST. GFR  (AFRICAN AMERICAN): >60 ML/MIN/1.73 M^2
EST. GFR  (NON AFRICAN AMERICAN): 60 ML/MIN/1.73 M^2
GLUCOSE SERPL-MCNC: 104 MG/DL (ref 70–110)
HCT VFR BLD AUTO: 37.9 % (ref 37–48.5)
HGB BLD-MCNC: 11.9 G/DL (ref 12–16)
IMM GRANULOCYTES # BLD AUTO: 0.1 K/UL (ref 0–0.04)
IMM GRANULOCYTES NFR BLD AUTO: 1 % (ref 0–0.5)
LYMPHOCYTES # BLD AUTO: 1.8 K/UL (ref 1–4.8)
LYMPHOCYTES NFR BLD: 18 % (ref 18–48)
MCH RBC QN AUTO: 27.5 PG (ref 27–31)
MCHC RBC AUTO-ENTMCNC: 31.4 G/DL (ref 32–36)
MCV RBC AUTO: 88 FL (ref 82–98)
MONOCYTES # BLD AUTO: 1.1 K/UL (ref 0.3–1)
MONOCYTES NFR BLD: 11.2 % (ref 4–15)
NEUTROPHILS # BLD AUTO: 6.9 K/UL (ref 1.8–7.7)
NEUTROPHILS NFR BLD: 68.9 % (ref 38–73)
NRBC BLD-RTO: 0 /100 WBC
PLATELET # BLD AUTO: 338 K/UL (ref 150–450)
PMV BLD AUTO: 11.8 FL (ref 9.2–12.9)
POTASSIUM SERPL-SCNC: 3.7 MMOL/L (ref 3.5–5.1)
PROT SERPL-MCNC: 7.9 G/DL (ref 6–8.4)
RBC # BLD AUTO: 4.32 M/UL (ref 4–5.4)
SODIUM SERPL-SCNC: 144 MMOL/L (ref 136–145)
WBC # BLD AUTO: 9.95 K/UL (ref 3.9–12.7)

## 2022-01-25 PROCEDURE — 99215 OFFICE O/P EST HI 40 MIN: CPT | Mod: S$PBB,,, | Performed by: INTERNAL MEDICINE

## 2022-01-25 PROCEDURE — 80053 COMPREHEN METABOLIC PANEL: CPT | Performed by: INTERNAL MEDICINE

## 2022-01-25 PROCEDURE — 3077F SYST BP >= 140 MM HG: CPT | Mod: CPTII,,, | Performed by: INTERNAL MEDICINE

## 2022-01-25 PROCEDURE — 3077F PR MOST RECENT SYSTOLIC BLOOD PRESSURE >= 140 MM HG: ICD-10-PCS | Mod: CPTII,,, | Performed by: INTERNAL MEDICINE

## 2022-01-25 PROCEDURE — 99214 OFFICE O/P EST MOD 30 MIN: CPT | Mod: PBBFAC | Performed by: INTERNAL MEDICINE

## 2022-01-25 PROCEDURE — 85652 RBC SED RATE AUTOMATED: CPT | Performed by: INTERNAL MEDICINE

## 2022-01-25 PROCEDURE — 3080F PR MOST RECENT DIASTOLIC BLOOD PRESSURE >= 90 MM HG: ICD-10-PCS | Mod: CPTII,,, | Performed by: INTERNAL MEDICINE

## 2022-01-25 PROCEDURE — 1159F PR MEDICATION LIST DOCUMENTED IN MEDICAL RECORD: ICD-10-PCS | Mod: CPTII,,, | Performed by: INTERNAL MEDICINE

## 2022-01-25 PROCEDURE — 3008F PR BODY MASS INDEX (BMI) DOCUMENTED: ICD-10-PCS | Mod: CPTII,,, | Performed by: INTERNAL MEDICINE

## 2022-01-25 PROCEDURE — 36415 COLL VENOUS BLD VENIPUNCTURE: CPT | Performed by: INTERNAL MEDICINE

## 2022-01-25 PROCEDURE — 99215 PR OFFICE/OUTPT VISIT, EST, LEVL V, 40-54 MIN: ICD-10-PCS | Mod: S$PBB,,, | Performed by: INTERNAL MEDICINE

## 2022-01-25 PROCEDURE — 86140 C-REACTIVE PROTEIN: CPT | Performed by: INTERNAL MEDICINE

## 2022-01-25 PROCEDURE — 85025 COMPLETE CBC W/AUTO DIFF WBC: CPT | Performed by: INTERNAL MEDICINE

## 2022-01-25 PROCEDURE — 1160F RVW MEDS BY RX/DR IN RCRD: CPT | Mod: CPTII,,, | Performed by: INTERNAL MEDICINE

## 2022-01-25 PROCEDURE — 3008F BODY MASS INDEX DOCD: CPT | Mod: CPTII,,, | Performed by: INTERNAL MEDICINE

## 2022-01-25 PROCEDURE — 1159F MED LIST DOCD IN RCRD: CPT | Mod: CPTII,,, | Performed by: INTERNAL MEDICINE

## 2022-01-25 PROCEDURE — 99999 PR PBB SHADOW E&M-EST. PATIENT-LVL IV: CPT | Mod: PBBFAC,,, | Performed by: INTERNAL MEDICINE

## 2022-01-25 PROCEDURE — 1160F PR REVIEW ALL MEDS BY PRESCRIBER/CLIN PHARMACIST DOCUMENTED: ICD-10-PCS | Mod: CPTII,,, | Performed by: INTERNAL MEDICINE

## 2022-01-25 PROCEDURE — 3080F DIAST BP >= 90 MM HG: CPT | Mod: CPTII,,, | Performed by: INTERNAL MEDICINE

## 2022-01-25 PROCEDURE — 99999 PR PBB SHADOW E&M-EST. PATIENT-LVL IV: ICD-10-PCS | Mod: PBBFAC,,, | Performed by: INTERNAL MEDICINE

## 2022-01-25 RX ORDER — HEPARIN 100 UNIT/ML
500 SYRINGE INTRAVENOUS
Status: CANCELLED | OUTPATIENT
Start: 2022-01-25

## 2022-01-25 RX ORDER — AMLODIPINE BESYLATE 10 MG/1
10 TABLET ORAL DAILY
COMMUNITY
End: 2022-10-12

## 2022-01-25 RX ORDER — AZATHIOPRINE 50 MG/1
100 TABLET ORAL 2 TIMES DAILY
Qty: 120 TABLET | Refills: 1 | Status: SHIPPED | OUTPATIENT
Start: 2022-01-25 | End: 2022-01-25 | Stop reason: SDUPTHER

## 2022-01-25 RX ORDER — FAMOTIDINE 10 MG/ML
20 INJECTION INTRAVENOUS
Status: CANCELLED | OUTPATIENT
Start: 2022-01-25

## 2022-01-25 RX ORDER — SODIUM CHLORIDE 0.9 % (FLUSH) 0.9 %
10 SYRINGE (ML) INJECTION
Status: CANCELLED | OUTPATIENT
Start: 2022-01-25

## 2022-01-25 RX ORDER — AZATHIOPRINE 50 MG/1
100 TABLET ORAL 2 TIMES DAILY
Qty: 120 TABLET | Refills: 1 | Status: SHIPPED | OUTPATIENT
Start: 2022-01-25 | End: 2022-03-24

## 2022-01-25 RX ORDER — PREGABALIN 100 MG/1
100 CAPSULE ORAL 3 TIMES DAILY
Qty: 90 CAPSULE | Refills: 3 | Status: SHIPPED | OUTPATIENT
Start: 2022-01-25 | End: 2022-04-27 | Stop reason: ALTCHOICE

## 2022-01-25 RX ORDER — ACETAMINOPHEN 325 MG/1
650 TABLET ORAL
Status: CANCELLED | OUTPATIENT
Start: 2022-01-25

## 2022-01-25 RX ORDER — PREDNISONE 20 MG/1
20 TABLET ORAL DAILY
Qty: 30 TABLET | Refills: 0 | Status: SHIPPED | OUTPATIENT
Start: 2022-01-25 | End: 2022-03-24

## 2022-01-25 NOTE — TELEPHONE ENCOUNTER
Spoke with pt. Next Rituxan infusion is scheduled urgently per Dr LÓPEZ's orders for 1/27/22.  Pre-cert dept notified. We will call pt if she is not approved by that date.

## 2022-01-25 NOTE — PROGRESS NOTES
RHEUMATOLOGY CLINIC FOLLOW UP VISIT  Chief complaints, HPI, ROS, EXAM, Assessment & Plans:-  Denise Sapp a 46 y.o. pleasant female comes in for follow-up visit.  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  She is on rituximab therapy after failing multiple medications along with Imuran for maintenance therapy.  She  Is reporting flare up since last month.  Complains of pain all over the body today.  Rheumatological review of system negative otherwise.  Physical examination shows mild synovitis over few PIP joints.  Tenderness all over.    1. Microscopic polyangiitis    2. Immunocompromised    3. Long term current use of immunosuppressive drug    4. Fibromyalgia    5. Rheumatoid arthritis of multiple sites with negative rheumatoid factor      Problem List Items Addressed This Visit     Rheumatoid arthritis of multiple sites with negative rheumatoid factor    Microscopic polyangiitis - Primary    Relevant Medications    predniSONE (DELTASONE) 20 MG tablet    azaTHIOprine (IMURAN) 50 mg Tab    Long term current use of immunosuppressive drug    Immunocompromised    Fibromyalgia    Relevant Medications    pregabalin (LYRICA) 100 MG capsule           Severely progressive active microscopic polyangiitis on Imuran after completion of induction therapy with rituximab.    Worsening disease. Repeat rituximab - dose at 1000 mg x 2 doses 2 weeks apart - EVERY 4 MONTHS.    Increase lyrica to 100 mg tid for fibromyalgia . Failed gabapentin.   Hold Imuran if any infection.   Safety labs for Imuran in 12 weeks    Compromised immune system secondary to autoimmune disease and use of immunosuppressive drugs. Monitor carefully for infections. Advised to get immediate medical care if any infection. Also advised strict adherence to age appropriate vaccinations and cancer  screenings with PCP.   # Follow up in about 3 months (around 4/25/2022).    Medication List with Changes/Refills   Current Medications    AMLODIPINE (NORVASC) 10 MG TABLET    Take 10 mg by mouth once daily.    ASPIRIN 325 MG TABLET        BUMETANIDE (BUMEX) 2 MG TABLET    Take 2 mg by mouth 2 (two) times daily.    CARVEDILOL (COREG) 25 MG TABLET    Take 25 mg by mouth 2 (two) times daily.    CLONAZEPAM (KLONOPIN) 1 MG TABLET    TAKE 1 TABLET BY MOUTH ONCE DAILY AT BEDTIME AS NEEDED FOR ANXIETY SLEEP    CLONIDINE (CATAPRES) 0.3 MG TABLET        DULOXETINE (CYMBALTA) 20 MG CAPSULE    Take 20 mg by mouth 2 (two) times daily.    ESCITALOPRAM OXALATE (LEXAPRO) 20 MG TABLET    TAKE 1 TABLET BY MOUTH ONCE DAILY FOR 30 DAYS    FUROSEMIDE (LASIX) 20 MG TABLET    Take 20 mg by mouth 2 (two) times daily.    IRBESARTAN-HYDROCHLOROTHIAZIDE (AVALIDE) 300-12.5 MG PER TABLET    Take 1 tablet by mouth once daily.    LIDOCAINE (LIDODERM) 5 %    1 patch once daily.    MIRTAZAPINE (REMERON) 15 MG TABLET    Take 15 mg by mouth nightly.    NIFEDIPINE (ADALAT CC) 30 MG TBSR        OLMESARTAN (BENICAR) 20 MG TABLET    Take 20 mg by mouth every morning.    PANTOPRAZOLE (PROTONIX) 20 MG TABLET    Take 20 mg by mouth every morning.    POTASSIUM CHLORIDE SA (K-DUR,KLOR-CON) 20 MEQ TABLET    Take 1 tablet (20 mEq total) by mouth 2 (two) times daily.    PREGABALIN (LYRICA) 100 MG CAPSULE    Take 1 capsule (100 mg total) by mouth 2 (two) times daily.    SPIRONOLACTONE (ALDACTONE) 25 MG TABLET    Take 25 mg by mouth once daily.    TEMAZEPAM (RESTORIL) 15 MG CAP    TAKE 1 CAPSULE BY MOUTH ONCE DAILY AT BEDTIME AS NEEDED    TIZANIDINE (ZANAFLEX) 4 MG TABLET    TAKE 1 TABLET BY MOUTH AS NEEDED THREE TIMES A DAY IF NEEDED FOR PAIN    TRAMADOL (ULTRAM) 50 MG TABLET    Take 1 tablet (50 mg total) by mouth every 12 (twelve) hours as needed for Pain.    TRIAMTERENE-HYDROCHLOROTHIAZIDE 37.5-25 MG (MAXZIDE-25) 37.5-25 MG PER TABLET    Take 1 tablet by mouth  every morning.   Changed and/or Refilled Medications    Modified Medication Previous Medication    AZATHIOPRINE (IMURAN) 50 MG TAB azaTHIOprine (IMURAN) 50 mg Tab       Take 2 tablets (100 mg total) by mouth 2 (two) times daily.    Take 2 tablets (100 mg total) by mouth 2 (two) times daily.    PREDNISONE (DELTASONE) 20 MG TABLET predniSONE (DELTASONE) 5 MG tablet       Take 1 tablet (20 mg total) by mouth once daily.    Take 2 tablets (10 mg total) by mouth once daily.       Past Medical History:   Diagnosis Date    Hypertension     Stroke            Past Surgical History:   Procedure Laterality Date     SECTION      TUBAL LIGATION          Social History     Tobacco Use    Smoking status: Never Smoker    Smokeless tobacco: Never Used   Substance Use Topics    Alcohol use: Not Currently       Family History   Problem Relation Age of Onset    Hypertension Mother     Cancer Father         esophegal       Review of patient's allergies indicates:   Allergen Reactions    Oranges [orange] Hives       Disclaimer: This note was prepared using voice recognition system and is likely to have sound alike errors and is not proof read.  Please call me with any questions.

## 2022-01-26 ENCOUNTER — PATIENT MESSAGE (OUTPATIENT)
Dept: RHEUMATOLOGY | Facility: CLINIC | Age: 47
End: 2022-01-26
Payer: MEDICAID

## 2022-01-26 LAB — ERYTHROCYTE [SEDIMENTATION RATE] IN BLOOD BY WESTERGREN METHOD: 92 MM/HR (ref 0–36)

## 2022-01-27 ENCOUNTER — INFUSION (OUTPATIENT)
Dept: INFUSION THERAPY | Facility: HOSPITAL | Age: 47
End: 2022-01-27
Attending: INTERNAL MEDICINE
Payer: MEDICAID

## 2022-01-27 VITALS
TEMPERATURE: 98 F | SYSTOLIC BLOOD PRESSURE: 142 MMHG | BODY MASS INDEX: 42.54 KG/M2 | HEART RATE: 72 BPM | DIASTOLIC BLOOD PRESSURE: 90 MMHG | RESPIRATION RATE: 16 BRPM | OXYGEN SATURATION: 99 % | WEIGHT: 232.56 LBS

## 2022-01-27 DIAGNOSIS — M06.09 RHEUMATOID ARTHRITIS OF MULTIPLE SITES WITH NEGATIVE RHEUMATOID FACTOR: ICD-10-CM

## 2022-01-27 DIAGNOSIS — M31.7 MICROSCOPIC POLYANGIITIS: Primary | ICD-10-CM

## 2022-01-27 PROCEDURE — 25000003 PHARM REV CODE 250: Performed by: INTERNAL MEDICINE

## 2022-01-27 PROCEDURE — A4216 STERILE WATER/SALINE, 10 ML: HCPCS | Performed by: INTERNAL MEDICINE

## 2022-01-27 PROCEDURE — 96375 TX/PRO/DX INJ NEW DRUG ADDON: CPT

## 2022-01-27 PROCEDURE — 96413 CHEMO IV INFUSION 1 HR: CPT

## 2022-01-27 PROCEDURE — 63600175 PHARM REV CODE 636 W HCPCS: Performed by: INTERNAL MEDICINE

## 2022-01-27 PROCEDURE — 96415 CHEMO IV INFUSION ADDL HR: CPT

## 2022-01-27 RX ORDER — FAMOTIDINE 10 MG/ML
20 INJECTION INTRAVENOUS
Status: CANCELLED | OUTPATIENT
Start: 2022-02-10

## 2022-01-27 RX ORDER — METHYLPREDNISOLONE SOD SUCC 125 MG
100 VIAL (EA) INJECTION
Status: COMPLETED | OUTPATIENT
Start: 2022-01-27 | End: 2022-01-27

## 2022-01-27 RX ORDER — SODIUM CHLORIDE 0.9 % (FLUSH) 0.9 %
10 SYRINGE (ML) INJECTION
Status: DISCONTINUED | OUTPATIENT
Start: 2022-01-27 | End: 2022-01-27 | Stop reason: HOSPADM

## 2022-01-27 RX ORDER — ACETAMINOPHEN 325 MG/1
650 TABLET ORAL
Status: CANCELLED | OUTPATIENT
Start: 2022-02-10

## 2022-01-27 RX ORDER — METHYLPREDNISOLONE SOD SUCC 125 MG
100 VIAL (EA) INJECTION
Status: CANCELLED
Start: 2022-02-10

## 2022-01-27 RX ORDER — HEPARIN 100 UNIT/ML
500 SYRINGE INTRAVENOUS
Status: CANCELLED | OUTPATIENT
Start: 2022-02-10

## 2022-01-27 RX ORDER — DIPHENHYDRAMINE HYDROCHLORIDE 50 MG/ML
25 INJECTION INTRAMUSCULAR; INTRAVENOUS
Status: CANCELLED
Start: 2022-02-10

## 2022-01-27 RX ORDER — SODIUM CHLORIDE 0.9 % (FLUSH) 0.9 %
10 SYRINGE (ML) INJECTION
Status: CANCELLED | OUTPATIENT
Start: 2022-02-10

## 2022-01-27 RX ORDER — FAMOTIDINE 10 MG/ML
20 INJECTION INTRAVENOUS
Status: COMPLETED | OUTPATIENT
Start: 2022-01-27 | End: 2022-01-27

## 2022-01-27 RX ORDER — DIPHENHYDRAMINE HYDROCHLORIDE 50 MG/ML
25 INJECTION INTRAMUSCULAR; INTRAVENOUS
Status: COMPLETED | OUTPATIENT
Start: 2022-01-27 | End: 2022-01-27

## 2022-01-27 RX ORDER — ACETAMINOPHEN 325 MG/1
650 TABLET ORAL
Status: COMPLETED | OUTPATIENT
Start: 2022-01-27 | End: 2022-01-27

## 2022-01-27 RX ORDER — CLONIDINE 0.3 MG/D
1 PATCH TRANSDERMAL WEEKLY
COMMUNITY
Start: 2022-01-25 | End: 2022-10-12

## 2022-01-27 RX ORDER — VALSARTAN 320 MG/1
320 TABLET ORAL DAILY
COMMUNITY
Start: 2022-01-26 | End: 2022-10-12

## 2022-01-27 RX ADMIN — FAMOTIDINE 20 MG: 10 INJECTION INTRAVENOUS at 09:01

## 2022-01-27 RX ADMIN — ACETAMINOPHEN 650 MG: 325 TABLET ORAL at 09:01

## 2022-01-27 RX ADMIN — RITUXIMAB 1000 MG: 10 INJECTION, SOLUTION INTRAVENOUS at 10:01

## 2022-01-27 RX ADMIN — DIPHENHYDRAMINE HYDROCHLORIDE 25 MG: 50 INJECTION INTRAMUSCULAR; INTRAVENOUS at 09:01

## 2022-01-27 RX ADMIN — METHYLPREDNISOLONE SODIUM SUCCINATE 100 MG: 125 INJECTION, POWDER, FOR SOLUTION INTRAMUSCULAR; INTRAVENOUS at 09:01

## 2022-01-27 RX ADMIN — Medication 10 ML: at 09:01

## 2022-01-27 NOTE — NURSING
Infusion # 1 of 2-Cycle 3 - Rituxan 1,000 mg   Last dose-9/8/21     Any:  -recent illness, infection, or antibiotic use in past week- denies  -open wounds or mouth sores- denies  -invasive procedures or surgeries in past 4 weeks or in upcoming 4 weeks- denies  -vaccinations in past week- denies  -chance you may be pregnant- n/a     Recent labs- 1/25/22  Last provider visit- Seen by Dr. LÓPEZ on 1/25/22     Premeds- 650 mg Tylenol PO, 100 mg Solumedrol IVP over 3 minutes, 20 mg Pepcid IVP over 3 minutes, and 25 mg Benadryl IVP over 3 minutes.       Rituxan 1,000 mg administered IV over 4 hours and 27 minutes starting at 50 ml/hr and increasing rate by 50 ml/hr every 30 minutes to a max rate of 400 ml/hr per protocol; see MAR and vitals for more details. Tolerated well without adverse events, discharged and ambulatory out of clinic.

## 2022-01-27 NOTE — PLAN OF CARE
Plan of care reviewed with patient. Discussed if there are any new or ongoing concerns. Denies.   Problem: Adult Inpatient Plan of Care  Goal: Plan of Care Review  Outcome: Ongoing, Progressing  Flowsheets (Taken 1/27/2022 0827)  Plan of Care Reviewed With: patient  Goal: Absence of Hospital-Acquired Illness or Injury  Outcome: Ongoing, Progressing  Intervention: Identify and Manage Fall Risk  Flowsheets (Taken 1/27/2022 0827)  Safety Promotion/Fall Prevention: in recliner, wheels locked  Goal: Optimal Comfort and Wellbeing  Outcome: Ongoing, Progressing  Intervention: Provide Person-Centered Care  Flowsheets (Taken 1/27/2022 0827)  Trust Relationship/Rapport:   care explained   questions encouraged   choices provided   reassurance provided   emotional support provided   thoughts/feelings acknowledged   empathic listening provided   questions answered     Problem: Infection  Goal: Absence of Infection Signs and Symptoms  Outcome: Ongoing, Progressing  Intervention: Prevent or Manage Infection  Flowsheets (Taken 1/27/2022 0827)  Infection Management: aseptic technique maintained

## 2022-02-10 ENCOUNTER — INFUSION (OUTPATIENT)
Dept: INFUSION THERAPY | Facility: HOSPITAL | Age: 47
End: 2022-02-10
Attending: INTERNAL MEDICINE
Payer: MEDICAID

## 2022-02-10 VITALS
DIASTOLIC BLOOD PRESSURE: 85 MMHG | HEART RATE: 79 BPM | RESPIRATION RATE: 16 BRPM | OXYGEN SATURATION: 98 % | TEMPERATURE: 98 F | SYSTOLIC BLOOD PRESSURE: 151 MMHG

## 2022-02-10 DIAGNOSIS — M06.09 RHEUMATOID ARTHRITIS OF MULTIPLE SITES WITH NEGATIVE RHEUMATOID FACTOR: ICD-10-CM

## 2022-02-10 DIAGNOSIS — M31.7 MICROSCOPIC POLYANGIITIS: Primary | ICD-10-CM

## 2022-02-10 PROCEDURE — 96415 CHEMO IV INFUSION ADDL HR: CPT

## 2022-02-10 PROCEDURE — 25000003 PHARM REV CODE 250: Performed by: INTERNAL MEDICINE

## 2022-02-10 PROCEDURE — 96413 CHEMO IV INFUSION 1 HR: CPT

## 2022-02-10 PROCEDURE — 63600175 PHARM REV CODE 636 W HCPCS: Mod: JG | Performed by: INTERNAL MEDICINE

## 2022-02-10 PROCEDURE — 96375 TX/PRO/DX INJ NEW DRUG ADDON: CPT

## 2022-02-10 RX ORDER — DIPHENHYDRAMINE HYDROCHLORIDE 50 MG/ML
25 INJECTION INTRAMUSCULAR; INTRAVENOUS
Status: COMPLETED | OUTPATIENT
Start: 2022-02-10 | End: 2022-02-10

## 2022-02-10 RX ORDER — ACETAMINOPHEN 325 MG/1
650 TABLET ORAL
Status: CANCELLED | OUTPATIENT
Start: 2022-02-24

## 2022-02-10 RX ORDER — METHYLPREDNISOLONE SOD SUCC 125 MG
100 VIAL (EA) INJECTION
Status: COMPLETED | OUTPATIENT
Start: 2022-02-10 | End: 2022-02-10

## 2022-02-10 RX ORDER — DIPHENHYDRAMINE HYDROCHLORIDE 50 MG/ML
25 INJECTION INTRAMUSCULAR; INTRAVENOUS
Status: CANCELLED
Start: 2022-02-24

## 2022-02-10 RX ORDER — HEPARIN 100 UNIT/ML
500 SYRINGE INTRAVENOUS
Status: CANCELLED | OUTPATIENT
Start: 2022-02-24

## 2022-02-10 RX ORDER — METHYLPREDNISOLONE SOD SUCC 125 MG
100 VIAL (EA) INJECTION
Status: CANCELLED
Start: 2022-02-24

## 2022-02-10 RX ORDER — FAMOTIDINE 10 MG/ML
20 INJECTION INTRAVENOUS
Status: COMPLETED | OUTPATIENT
Start: 2022-02-10 | End: 2022-02-10

## 2022-02-10 RX ORDER — ACETAMINOPHEN 325 MG/1
650 TABLET ORAL
Status: COMPLETED | OUTPATIENT
Start: 2022-02-10 | End: 2022-02-10

## 2022-02-10 RX ORDER — SODIUM CHLORIDE 0.9 % (FLUSH) 0.9 %
10 SYRINGE (ML) INJECTION
Status: CANCELLED | OUTPATIENT
Start: 2022-02-24

## 2022-02-10 RX ORDER — FAMOTIDINE 10 MG/ML
20 INJECTION INTRAVENOUS
Status: CANCELLED | OUTPATIENT
Start: 2022-02-24

## 2022-02-10 RX ADMIN — RITUXIMAB 1000 MG: 10 INJECTION, SOLUTION INTRAVENOUS at 09:02

## 2022-02-10 RX ADMIN — ACETAMINOPHEN 650 MG: 325 TABLET ORAL at 09:02

## 2022-02-10 RX ADMIN — DIPHENHYDRAMINE HYDROCHLORIDE 25 MG: 50 INJECTION INTRAMUSCULAR; INTRAVENOUS at 09:02

## 2022-02-10 RX ADMIN — FAMOTIDINE 20 MG: 10 INJECTION INTRAVENOUS at 09:02

## 2022-02-10 RX ADMIN — METHYLPREDNISOLONE SODIUM SUCCINATE 100 MG: 125 INJECTION, POWDER, FOR SOLUTION INTRAMUSCULAR; INTRAVENOUS at 09:02

## 2022-02-10 NOTE — DISCHARGE INSTRUCTIONS
[facility]  [address] [facility]  [address]  [phone] phone     [phone] fax  Hours of Operation: Monday- Friday 8:00am- 5:00pm  After hours phone  [phone]  Hematology / Oncology Physicians on call      Dr. Rashel Chopra, NICOLE Daniels NP    Please call with any concerns regarding your appointment today.    HOME CARE AFTER CHEMOTHERAPY   Meals   Many patients feel sick and lose their appetites during treatment. Eat small meals several times a day. Choose bland foods with little taste or smell if you have problems with nausea. Be sure to cook all food thoroughly. This kills bacteria and helps you avoid intestinal infection. Soft foods are easier to swallow and digest.   Activity   Exercise keeps you strong and keeps your heart and lungs active. Talk to your doctor about an appropriate exercise program for you.   Skin Care   To prevent a skin infection, bathe or shower once a day. Use a moisturizing soap and wash with warm water. Avoid very hot or cold water. Chemotherapy can make your skin dry . Apply moisturizing lotion to help relieve dry skin. Some drugs used in high doses can cause slight burns to appear (like sunburn). Ask for a special cream to help relieve the burn and protect your skin.   Prevent Mouth Sores   During chemotherapy, many people get mouth sores. Do the following to help prevent mouth sores or to ease discomfort.   Brush your teeth with a soft-bristle toothbrush after every meal.  Don't use dental floss if your platelet count is below 50,000. Your doctor or nurse will tell you if this is the case.  Use an oral swab or special soft toothbrush if your gums bleed during regular brushing.  Use mouthwash as directed. If you can't tolerate commercial mouthwash, use salt and baking soda to clean your mouth. Mix 1 teaspoon of salt and 1 teaspoon of baking soda into a glass of water.  Swish and spit.  Call your doctor or return to this facility if you develop any of the following:   Sore throat   White patches in the mouth or throat   Fever of 100.4ºF (38ºC) or higher, or as directed by your healthcare provider  © 4002-9175 [employer], [address] [place] [address]. All rights reserved. This information is not intended as a substitute for professional medical care. Always follow your healthcare professional's   FALL PREVENTION   Falls often occur due to slipping, tripping or losing your balance. Here are ways to reduce your risk of falling again.   Was there anything that caused your fall that can be fixed, removed or replaced?   Make your home safe by keeping walkways clear of objects you may trip over.   Use non-slip pads under rugs.   Do not walk in poorly lit areas.   Do not stand on chairs or wobbly ladders.   Use caution when reaching overhead or looking upward. This position can cause a loss of balance.   Be sure your shoes fit properly, have non-slip bottoms and are in good condition.   Be cautious when going up and down stairs, curbs, and when walking on uneven sidewalks.   If your balance is poor, consider using a cane or walker.   If your fall was related to alcohol use, stop or limit alcohol intake.   If your fall was related to use of sleeping medicines, talk to your doctor about this. You may need to reduce your dosage at bedtime if you awaken during the night to go to the bathroom.   To reduce the need for nighttime bathroom trips:   Avoid drinking fluids for several hours before going to bed   Empty your bladder before going to bed   Men can keep a urinal at the bedside   © 4186-5419 [employer] [facility], [address] [place], [place] [id number]. All rights reserved. This information is not intended as a substitute for professional medical care. Always follow your healthcare professional's instructions.  WAYS TO HELP PREVENT INFECTION         WASH YOUR HANDS OFTEN  DURING THE DAY, ESPECIALLY BEFORE YOU EAT, AFTER USING THE BATHROOM, AND AFTER TOUCHING ANIMALS     STAY AWAY FROM PEOPLE WHO HAVE ILLNESSES YOU CAN CATCH; SUCH AS COLDS, FLU, CHICKEN POX     TRY TO AVOID CROWDS     STAY AWAY FROM CHILDREN WHO RECENTLY HAVE RECEIVED LIVE VIRUS VACCINES     MAINTAIN GOOD MOUTH CARE     DO NOT SQUEEZE OR SCRATCH PIMPLES     CLEAN CUTS & SCRAPES RIGHT AWAY AND DAILY UNTIL HEALED WITH WARM WATER, SOAP & AN ANTISEPTIC     AVOID CONTACT WITH LITTER BOXES, BIRD CAGES, & FISH TANKS     AVOID STANDING WATER, IE., BIRD BATHS, FLOWER POTS/VASES, OR HUMIDIFIERS     WEAR GLOVES WHEN GARDENING OR CLEANING UP AFTER OTHERS, ESPECIALLY BABIES & SMALL CHILDREN     DO NOT EAT RAW FISH, SEAFOOD, MEAT, OR EGGS

## 2022-02-12 ENCOUNTER — PATIENT MESSAGE (OUTPATIENT)
Dept: RHEUMATOLOGY | Facility: CLINIC | Age: 47
End: 2022-02-12
Payer: MEDICAID

## 2022-02-12 DIAGNOSIS — Z79.899 LONG TERM CURRENT USE OF IMMUNOSUPPRESSIVE DRUG: ICD-10-CM

## 2022-02-12 DIAGNOSIS — D84.9 IMMUNOCOMPROMISED: Primary | ICD-10-CM

## 2022-02-12 DIAGNOSIS — U07.1 COVID-19: ICD-10-CM

## 2022-02-14 NOTE — TELEPHONE ENCOUNTER
Request to continue prednisone, stop imuran, call PCP or go to nearest ED immediately. Order for antibody infusion placed today. Unsure about the waiting time.

## 2022-02-26 ENCOUNTER — NURSE TRIAGE (OUTPATIENT)
Dept: ADMINISTRATIVE | Facility: CLINIC | Age: 47
End: 2022-02-26
Payer: MEDICAID

## 2022-02-27 NOTE — TELEPHONE ENCOUNTER
[facility] pt states she had COVID-19 two weeks ago. Pt states she tested negative four a days ago, took another test today and tested positive for COVID-19.  Pt states she started experiencing chest pain and tightness, pain left calf, fatigue, runny nose, and SOB with activity today.  Pt states MD placed order for monoclonal infusion but infusion center called patient and told her she did not qualify for infusion.  Pt wants to have monoclonal infusion.    Patient was advised to go to ED now per triage protocol.  Pt verbalized understanding.     Reason for Disposition   [1] MODERATE difficulty breathing (e.g., speaks in phrases, SOB even at rest, pulse 100-120) AND [2] NEW-onset or WORSE than normal    Additional Information   Negative: SEVERE difficulty breathing (e.g., struggling for each breath, speaks in single words)   Negative: [1] Breathing stopped AND [2] hasn't returned   Negative: Choking on something   Negative: Bluish (or gray) lips or face now   Negative: Difficult to awaken or acting confused (e.g., disoriented, slurred speech)   Negative: Passed out (i.e., lost consciousness, collapsed and was not responding)   Negative: Wheezing started suddenly after medicine, an allergic food or bee sting   Negative: Stridor   Negative: Slow, shallow and weak breathing   Negative: Sounds like a life-threatening emergency to the triager    Protocols used: BREATHING DIFFICULTY-A-AH

## 2022-02-28 ENCOUNTER — PATIENT MESSAGE (OUTPATIENT)
Dept: RHEUMATOLOGY | Facility: CLINIC | Age: 47
End: 2022-02-28
Payer: MEDICAID

## 2022-03-10 ENCOUNTER — PATIENT MESSAGE (OUTPATIENT)
Dept: RHEUMATOLOGY | Facility: CLINIC | Age: 47
End: 2022-03-10
Payer: MEDICAID

## 2022-03-10 DIAGNOSIS — M31.7 MICROSCOPIC POLYANGIITIS: ICD-10-CM

## 2022-03-10 DIAGNOSIS — M06.09 RHEUMATOID ARTHRITIS OF MULTIPLE SITES WITH NEGATIVE RHEUMATOID FACTOR: ICD-10-CM

## 2022-03-10 RX ORDER — TRAMADOL HYDROCHLORIDE 50 MG/1
50 TABLET ORAL EVERY 12 HOURS PRN
Qty: 60 TABLET | Refills: 0 | Status: SHIPPED | OUTPATIENT
Start: 2022-03-10 | End: 2022-06-03

## 2022-03-14 ENCOUNTER — PATIENT MESSAGE (OUTPATIENT)
Dept: RHEUMATOLOGY | Facility: CLINIC | Age: 47
End: 2022-03-14
Payer: MEDICAID

## 2022-03-14 ENCOUNTER — TELEPHONE (OUTPATIENT)
Dept: RHEUMATOLOGY | Facility: CLINIC | Age: 47
End: 2022-03-14
Payer: MEDICAID

## 2022-03-15 ENCOUNTER — PATIENT MESSAGE (OUTPATIENT)
Dept: RHEUMATOLOGY | Facility: CLINIC | Age: 47
End: 2022-03-15
Payer: MEDICAID

## 2022-03-18 ENCOUNTER — PATIENT MESSAGE (OUTPATIENT)
Dept: RHEUMATOLOGY | Facility: CLINIC | Age: 47
End: 2022-03-18
Payer: MEDICAID

## 2022-03-23 ENCOUNTER — LAB VISIT (OUTPATIENT)
Dept: LAB | Facility: HOSPITAL | Age: 47
End: 2022-03-23
Attending: FAMILY MEDICINE
Payer: MEDICAID

## 2022-03-23 DIAGNOSIS — M06.09 RHEUMATOID ARTHRITIS OF MULTIPLE SITES WITH NEGATIVE RHEUMATOID FACTOR: ICD-10-CM

## 2022-03-23 DIAGNOSIS — M31.7 MICROSCOPIC POLYANGIITIS: ICD-10-CM

## 2022-03-23 LAB
ALBUMIN SERPL BCP-MCNC: 3.4 G/DL (ref 3.5–5.2)
ALP SERPL-CCNC: 91 U/L (ref 55–135)
ALT SERPL W/O P-5'-P-CCNC: 15 U/L (ref 10–44)
ANION GAP SERPL CALC-SCNC: 12 MMOL/L (ref 8–16)
AST SERPL-CCNC: 14 U/L (ref 10–40)
BASOPHILS # BLD AUTO: 0.04 K/UL (ref 0–0.2)
BASOPHILS NFR BLD: 0.5 % (ref 0–1.9)
BILIRUB SERPL-MCNC: 0.2 MG/DL (ref 0.1–1)
BUN SERPL-MCNC: 15 MG/DL (ref 6–20)
CALCIUM SERPL-MCNC: 9.7 MG/DL (ref 8.7–10.5)
CHLORIDE SERPL-SCNC: 102 MMOL/L (ref 95–110)
CO2 SERPL-SCNC: 25 MMOL/L (ref 23–29)
CREAT SERPL-MCNC: 1 MG/DL (ref 0.5–1.4)
CRP SERPL-MCNC: 25.5 MG/L (ref 0–8.2)
DIFFERENTIAL METHOD: ABNORMAL
EOSINOPHIL # BLD AUTO: 0 K/UL (ref 0–0.5)
EOSINOPHIL NFR BLD: 0.1 % (ref 0–8)
ERYTHROCYTE [DISTWIDTH] IN BLOOD BY AUTOMATED COUNT: 15.5 % (ref 11.5–14.5)
ERYTHROCYTE [SEDIMENTATION RATE] IN BLOOD BY WESTERGREN METHOD: 107 MM/HR (ref 0–36)
EST. GFR  (AFRICAN AMERICAN): >60 ML/MIN/1.73 M^2
EST. GFR  (NON AFRICAN AMERICAN): >60 ML/MIN/1.73 M^2
GLUCOSE SERPL-MCNC: 118 MG/DL (ref 70–110)
HCT VFR BLD AUTO: 35.9 % (ref 37–48.5)
HGB BLD-MCNC: 11.1 G/DL (ref 12–16)
IMM GRANULOCYTES # BLD AUTO: 0.04 K/UL (ref 0–0.04)
IMM GRANULOCYTES NFR BLD AUTO: 0.5 % (ref 0–0.5)
LYMPHOCYTES # BLD AUTO: 1.9 K/UL (ref 1–4.8)
LYMPHOCYTES NFR BLD: 23.1 % (ref 18–48)
MCH RBC QN AUTO: 27.5 PG (ref 27–31)
MCHC RBC AUTO-ENTMCNC: 30.9 G/DL (ref 32–36)
MCV RBC AUTO: 89 FL (ref 82–98)
MONOCYTES # BLD AUTO: 0.6 K/UL (ref 0.3–1)
MONOCYTES NFR BLD: 7.6 % (ref 4–15)
NEUTROPHILS # BLD AUTO: 5.5 K/UL (ref 1.8–7.7)
NEUTROPHILS NFR BLD: 68.2 % (ref 38–73)
NRBC BLD-RTO: 0 /100 WBC
PLATELET # BLD AUTO: 372 K/UL (ref 150–450)
PMV BLD AUTO: 11.3 FL (ref 9.2–12.9)
POTASSIUM SERPL-SCNC: 4 MMOL/L (ref 3.5–5.1)
PROT SERPL-MCNC: 7.5 G/DL (ref 6–8.4)
RBC # BLD AUTO: 4.04 M/UL (ref 4–5.4)
SODIUM SERPL-SCNC: 139 MMOL/L (ref 136–145)
WBC # BLD AUTO: 8.06 K/UL (ref 3.9–12.7)

## 2022-03-23 PROCEDURE — 85025 COMPLETE CBC W/AUTO DIFF WBC: CPT | Performed by: INTERNAL MEDICINE

## 2022-03-23 PROCEDURE — 80053 COMPREHEN METABOLIC PANEL: CPT | Performed by: INTERNAL MEDICINE

## 2022-03-23 PROCEDURE — 85652 RBC SED RATE AUTOMATED: CPT | Performed by: INTERNAL MEDICINE

## 2022-03-23 PROCEDURE — 36415 COLL VENOUS BLD VENIPUNCTURE: CPT | Performed by: INTERNAL MEDICINE

## 2022-03-23 PROCEDURE — 86140 C-REACTIVE PROTEIN: CPT | Performed by: INTERNAL MEDICINE

## 2022-03-24 ENCOUNTER — PATIENT MESSAGE (OUTPATIENT)
Dept: RHEUMATOLOGY | Facility: CLINIC | Age: 47
End: 2022-03-24
Payer: MEDICAID

## 2022-03-24 DIAGNOSIS — M31.7 MICROSCOPIC POLYANGIITIS: ICD-10-CM

## 2022-03-24 RX ORDER — HEPARIN 100 UNIT/ML
500 SYRINGE INTRAVENOUS
Status: CANCELLED | OUTPATIENT
Start: 2022-03-24

## 2022-03-24 RX ORDER — PREDNISONE 20 MG/1
40 TABLET ORAL DAILY
Qty: 60 TABLET | Refills: 0 | Status: SHIPPED | OUTPATIENT
Start: 2022-03-24 | End: 2022-04-27

## 2022-03-24 RX ORDER — SODIUM CHLORIDE 0.9 % (FLUSH) 0.9 %
10 SYRINGE (ML) INJECTION
Status: CANCELLED | OUTPATIENT
Start: 2022-03-24

## 2022-03-24 RX ORDER — ACETAMINOPHEN 325 MG/1
650 TABLET ORAL EVERY 4 HOURS PRN
Status: CANCELLED | OUTPATIENT
Start: 2022-03-24

## 2022-03-24 RX ORDER — METHYLPREDNISOLONE SOD SUCC 125 MG
125 VIAL (EA) INJECTION
Status: CANCELLED | OUTPATIENT
Start: 2022-03-24

## 2022-03-24 NOTE — TELEPHONE ENCOUNTER
Worsening microscopic polyangiitis not responding to high dose prednisone, imuran and RITUXIMAB.   D/C imuran, increase dose of prednisone and start Monthly HIGH dose cytoxan infusion.   Called and advised patient. She voiced understanding.

## 2022-03-25 ENCOUNTER — PATIENT MESSAGE (OUTPATIENT)
Dept: RHEUMATOLOGY | Facility: CLINIC | Age: 47
End: 2022-03-25
Payer: MEDICAID

## 2022-03-25 NOTE — TELEPHONE ENCOUNTER
Dr LÓPEZ, Do you want labs or U/A on 4/8 just prior to treatment?  Also noted, no pre meds other than [place] and hydration. thanks  Spoke with the pt. Instructed on Cytoxan administration and infusion schedule. Pt is scheduled for 4/8/22 pending ins. Approval. Verbalized understanding. Stated Dr LÓPEZ had discussed treatment with her.

## 2022-04-01 ENCOUNTER — PATIENT MESSAGE (OUTPATIENT)
Dept: RHEUMATOLOGY | Facility: CLINIC | Age: 47
End: 2022-04-01
Payer: MEDICAID

## 2022-04-01 ENCOUNTER — TELEPHONE (OUTPATIENT)
Dept: RHEUMATOLOGY | Facility: CLINIC | Age: 47
End: 2022-04-01
Payer: MEDICAID

## 2022-04-01 NOTE — TELEPHONE ENCOUNTER
Pt states she received a denial letter for cytoxan from her ins. Co. Informed pt Dr LÓPEZ is communicating with the pre-service dept re: this and is waiting a decision. He may have to do a peer to peer. We will have more information next week. Verbalized understanding.

## 2022-04-04 ENCOUNTER — PATIENT MESSAGE (OUTPATIENT)
Dept: RHEUMATOLOGY | Facility: CLINIC | Age: 47
End: 2022-04-04
Payer: MEDICAID

## 2022-04-07 NOTE — TELEPHONE ENCOUNTER
"Pt stated she was reading the cytoxan information that was provider for her. She is concerned about hair loss. Informed pt the listed side effects are potential. Stated she still wants to have the cytoxan treatments but just wanted to say that she is "nervous" about it. Allowed pt to voice her concerns. Support given. Reviewed the infusion treatment administration/schedule with her. Asked it she needed to speak with Dr LÓPEZ. Pt declined and said they had already discussed the treatment in detail and she is ready to get started. Appt is scheduled for tomorrow a.m.   "

## 2022-04-08 ENCOUNTER — INFUSION (OUTPATIENT)
Dept: INFUSION THERAPY | Facility: HOSPITAL | Age: 47
End: 2022-04-08
Attending: INTERNAL MEDICINE
Payer: MEDICAID

## 2022-04-08 VITALS
DIASTOLIC BLOOD PRESSURE: 89 MMHG | RESPIRATION RATE: 16 BRPM | BODY MASS INDEX: 42.6 KG/M2 | OXYGEN SATURATION: 98 % | HEIGHT: 62 IN | SYSTOLIC BLOOD PRESSURE: 137 MMHG | HEART RATE: 73 BPM | WEIGHT: 231.5 LBS | TEMPERATURE: 98 F

## 2022-04-08 DIAGNOSIS — M31.7 MICROSCOPIC POLYANGIITIS: Primary | ICD-10-CM

## 2022-04-08 PROCEDURE — A4216 STERILE WATER/SALINE, 10 ML: HCPCS | Performed by: INTERNAL MEDICINE

## 2022-04-08 PROCEDURE — 96413 CHEMO IV INFUSION 1 HR: CPT

## 2022-04-08 PROCEDURE — 25000003 PHARM REV CODE 250: Performed by: INTERNAL MEDICINE

## 2022-04-08 PROCEDURE — 63600175 PHARM REV CODE 636 W HCPCS: Performed by: INTERNAL MEDICINE

## 2022-04-08 PROCEDURE — 63600175 PHARM REV CODE 636 W HCPCS: Mod: JG | Performed by: INTERNAL MEDICINE

## 2022-04-08 PROCEDURE — 96376 TX/PRO/DX INJ SAME DRUG ADON: CPT

## 2022-04-08 PROCEDURE — 96375 TX/PRO/DX INJ NEW DRUG ADDON: CPT

## 2022-04-08 RX ORDER — METHYLPREDNISOLONE SOD SUCC 125 MG
125 VIAL (EA) INJECTION
Status: COMPLETED | OUTPATIENT
Start: 2022-04-08 | End: 2022-04-08

## 2022-04-08 RX ORDER — METHYLPREDNISOLONE SOD SUCC 125 MG
125 VIAL (EA) INJECTION
Status: CANCELLED | OUTPATIENT
Start: 2022-04-09

## 2022-04-08 RX ORDER — HEPARIN 100 UNIT/ML
500 SYRINGE INTRAVENOUS
Status: CANCELLED | OUTPATIENT
Start: 2022-04-09

## 2022-04-08 RX ORDER — SODIUM CHLORIDE 0.9 % (FLUSH) 0.9 %
10 SYRINGE (ML) INJECTION
Status: CANCELLED | OUTPATIENT
Start: 2022-04-09

## 2022-04-08 RX ORDER — ACETAMINOPHEN 325 MG/1
650 TABLET ORAL EVERY 4 HOURS PRN
Status: CANCELLED | OUTPATIENT
Start: 2022-04-09

## 2022-04-08 RX ORDER — SODIUM CHLORIDE 0.9 % (FLUSH) 0.9 %
10 SYRINGE (ML) INJECTION
Status: DISCONTINUED | OUTPATIENT
Start: 2022-04-08 | End: 2022-04-08 | Stop reason: HOSPADM

## 2022-04-08 RX ADMIN — METHYLPREDNISOLONE SODIUM SUCCINATE 125 MG: 125 INJECTION, POWDER, FOR SOLUTION INTRAMUSCULAR; INTRAVENOUS at 10:04

## 2022-04-08 RX ADMIN — Medication 10 ML: at 08:04

## 2022-04-08 RX ADMIN — SODIUM CHLORIDE 500 ML: 0.9 INJECTION, SOLUTION INTRAVENOUS at 08:04

## 2022-04-08 RX ADMIN — MESNA 600 MG: 100 INJECTION, SOLUTION INTRAVENOUS at 12:04

## 2022-04-08 RX ADMIN — CYCLOPHOSPHAMIDE 1200 MG: 200 INJECTION, SOLUTION INTRAVENOUS at 10:04

## 2022-04-08 RX ADMIN — SODIUM CHLORIDE 500 ML: 0.9 INJECTION, SOLUTION INTRAVENOUS at 12:04

## 2022-04-08 RX ADMIN — MESNA 600 MG: 100 INJECTION, SOLUTION INTRAVENOUS at 10:04

## 2022-04-08 NOTE — NURSING
"Infusion #  1- Cytoxan 375 mg/m2 q month  Today's weight- 105 kg  Today's height- 5' 2"    Any:  -recent illness, infection, or antibiotic use in past week- denies  -open wounds or mouth sores-denies  -invasive procedures or surgeries in past 4 weeks or in upcoming 4 weeks- denies  -vaccinations in past week- denies  -any new symptoms/change in symptoms-denies  -any chance of pregnancy-n/a      Recent labs? 4/8/22    Last Rheumatology provider visit- Seen by Dr. LÓPEZ on 1/25/22     Pre-hydration-500 ml NS over 2 hours    Premeds-125 mg Solu-medrol IVP over 3 minutes and 600 mg Mesna IVPB over 15 minutes.     Cytoxan 1,200 mg administered IV at a 60 minute rate per orders; see MAR and vitals for more details.     Post-med- 600 mg Mesna IVPB over 15 minutes    Post-hydration- 500 ml NS over 2 hours    Tolerated well without adverse events, discharged and ambulatory from clinic via wheelchair with family member at side.    "

## 2022-04-08 NOTE — PLAN OF CARE
Plan of care reviewed with patient. Discussed if there are any new or ongoing concerns. Denies.   Problem: Adult Inpatient Plan of Care  Goal: Plan of Care Review  Outcome: Ongoing, Progressing  Flowsheets (Taken 4/8/2022 1238)  Plan of Care Reviewed With: patient  Goal: Patient-Specific Goal (Individualized)  Outcome: Ongoing, Progressing  Goal: Absence of Hospital-Acquired Illness or Injury  Outcome: Ongoing, Progressing  Intervention: Identify and Manage Fall Risk  Flowsheets (Taken 4/8/2022 1238)  Safety Promotion/Fall Prevention: in recliner, wheels locked  Goal: Optimal Comfort and Wellbeing  Outcome: Ongoing, Progressing  Intervention: Provide Person-Centered Care  Flowsheets (Taken 4/8/2022 1238)  Trust Relationship/Rapport:   care explained   reassurance provided   choices provided   thoughts/feelings acknowledged   emotional support provided   empathic listening provided   questions answered   questions encouraged     Problem: Infection  Goal: Absence of Infection Signs and Symptoms  Outcome: Ongoing, Progressing  Intervention: Prevent or Manage Infection  Flowsheets (Taken 4/8/2022 1238)  Infection Management: aseptic technique maintained

## 2022-04-27 ENCOUNTER — LAB VISIT (OUTPATIENT)
Dept: LAB | Facility: HOSPITAL | Age: 47
End: 2022-04-27
Attending: FAMILY MEDICINE
Payer: MEDICAID

## 2022-04-27 ENCOUNTER — OFFICE VISIT (OUTPATIENT)
Dept: RHEUMATOLOGY | Facility: CLINIC | Age: 47
End: 2022-04-27
Payer: MEDICAID

## 2022-04-27 VITALS
HEIGHT: 62 IN | SYSTOLIC BLOOD PRESSURE: 122 MMHG | HEART RATE: 94 BPM | DIASTOLIC BLOOD PRESSURE: 84 MMHG | WEIGHT: 227.06 LBS | BODY MASS INDEX: 41.78 KG/M2

## 2022-04-27 DIAGNOSIS — M06.4 INFLAMMATORY POLYARTHRITIS: ICD-10-CM

## 2022-04-27 DIAGNOSIS — Z22.7 TB LUNG, LATENT: ICD-10-CM

## 2022-04-27 DIAGNOSIS — Z79.899 LONG TERM CURRENT USE OF IMMUNOSUPPRESSIVE DRUG: ICD-10-CM

## 2022-04-27 DIAGNOSIS — D84.9 IMMUNOCOMPROMISED: ICD-10-CM

## 2022-04-27 DIAGNOSIS — Z09 CHEMOTHERAPY FOLLOW-UP EXAMINATION: ICD-10-CM

## 2022-04-27 DIAGNOSIS — M31.7 MICROSCOPIC POLYANGIITIS: Primary | ICD-10-CM

## 2022-04-27 DIAGNOSIS — M31.7 MICROSCOPIC POLYANGIITIS: ICD-10-CM

## 2022-04-27 LAB
ALBUMIN SERPL BCP-MCNC: 3.7 G/DL (ref 3.5–5.2)
ALP SERPL-CCNC: 79 U/L (ref 55–135)
ALT SERPL W/O P-5'-P-CCNC: 22 U/L (ref 10–44)
ANION GAP SERPL CALC-SCNC: 14 MMOL/L (ref 8–16)
AST SERPL-CCNC: 16 U/L (ref 10–40)
BASOPHILS # BLD AUTO: 0.03 K/UL (ref 0–0.2)
BASOPHILS NFR BLD: 0.6 % (ref 0–1.9)
BILIRUB SERPL-MCNC: 0.3 MG/DL (ref 0.1–1)
BUN SERPL-MCNC: 8 MG/DL (ref 6–20)
CALCIUM SERPL-MCNC: 9.1 MG/DL (ref 8.7–10.5)
CHLORIDE SERPL-SCNC: 103 MMOL/L (ref 95–110)
CO2 SERPL-SCNC: 24 MMOL/L (ref 23–29)
CREAT SERPL-MCNC: 1.1 MG/DL (ref 0.5–1.4)
CRP SERPL-MCNC: 14.2 MG/L (ref 0–8.2)
DIFFERENTIAL METHOD: ABNORMAL
EOSINOPHIL # BLD AUTO: 0 K/UL (ref 0–0.5)
EOSINOPHIL NFR BLD: 0.6 % (ref 0–8)
ERYTHROCYTE [DISTWIDTH] IN BLOOD BY AUTOMATED COUNT: 14.8 % (ref 11.5–14.5)
ERYTHROCYTE [SEDIMENTATION RATE] IN BLOOD BY WESTERGREN METHOD: 81 MM/HR (ref 0–36)
EST. GFR  (AFRICAN AMERICAN): >60 ML/MIN/1.73 M^2
EST. GFR  (NON AFRICAN AMERICAN): 60 ML/MIN/1.73 M^2
GLUCOSE SERPL-MCNC: 180 MG/DL (ref 70–110)
HCT VFR BLD AUTO: 40.5 % (ref 37–48.5)
HGB BLD-MCNC: 12.9 G/DL (ref 12–16)
IMM GRANULOCYTES # BLD AUTO: 0.01 K/UL (ref 0–0.04)
IMM GRANULOCYTES NFR BLD AUTO: 0.2 % (ref 0–0.5)
LYMPHOCYTES # BLD AUTO: 1.6 K/UL (ref 1–4.8)
LYMPHOCYTES NFR BLD: 32.1 % (ref 18–48)
MCH RBC QN AUTO: 27.5 PG (ref 27–31)
MCHC RBC AUTO-ENTMCNC: 31.9 G/DL (ref 32–36)
MCV RBC AUTO: 86 FL (ref 82–98)
MONOCYTES # BLD AUTO: 0.3 K/UL (ref 0.3–1)
MONOCYTES NFR BLD: 6 % (ref 4–15)
NEUTROPHILS # BLD AUTO: 3 K/UL (ref 1.8–7.7)
NEUTROPHILS NFR BLD: 60.5 % (ref 38–73)
NRBC BLD-RTO: 0 /100 WBC
PLATELET # BLD AUTO: 382 K/UL (ref 150–450)
PMV BLD AUTO: 11.6 FL (ref 9.2–12.9)
POTASSIUM SERPL-SCNC: 2.8 MMOL/L (ref 3.5–5.1)
PROT SERPL-MCNC: 7.7 G/DL (ref 6–8.4)
RBC # BLD AUTO: 4.69 M/UL (ref 4–5.4)
SODIUM SERPL-SCNC: 141 MMOL/L (ref 136–145)
WBC # BLD AUTO: 4.96 K/UL (ref 3.9–12.7)

## 2022-04-27 PROCEDURE — 99999 PR PBB SHADOW E&M-EST. PATIENT-LVL IV: ICD-10-PCS | Mod: PBBFAC,,, | Performed by: INTERNAL MEDICINE

## 2022-04-27 PROCEDURE — 99215 OFFICE O/P EST HI 40 MIN: CPT | Mod: S$PBB,,, | Performed by: INTERNAL MEDICINE

## 2022-04-27 PROCEDURE — 1160F RVW MEDS BY RX/DR IN RCRD: CPT | Mod: CPTII,,, | Performed by: INTERNAL MEDICINE

## 2022-04-27 PROCEDURE — 3008F BODY MASS INDEX DOCD: CPT | Mod: CPTII,,, | Performed by: INTERNAL MEDICINE

## 2022-04-27 PROCEDURE — 4010F PR ACE/ARB THEARPY RXD/TAKEN: ICD-10-PCS | Mod: CPTII,,, | Performed by: INTERNAL MEDICINE

## 2022-04-27 PROCEDURE — 3079F PR MOST RECENT DIASTOLIC BLOOD PRESSURE 80-89 MM HG: ICD-10-PCS | Mod: CPTII,,, | Performed by: INTERNAL MEDICINE

## 2022-04-27 PROCEDURE — 1159F MED LIST DOCD IN RCRD: CPT | Mod: CPTII,,, | Performed by: INTERNAL MEDICINE

## 2022-04-27 PROCEDURE — 80053 COMPREHEN METABOLIC PANEL: CPT | Performed by: INTERNAL MEDICINE

## 2022-04-27 PROCEDURE — 85025 COMPLETE CBC W/AUTO DIFF WBC: CPT | Performed by: INTERNAL MEDICINE

## 2022-04-27 PROCEDURE — 3074F PR MOST RECENT SYSTOLIC BLOOD PRESSURE < 130 MM HG: ICD-10-PCS | Mod: CPTII,,, | Performed by: INTERNAL MEDICINE

## 2022-04-27 PROCEDURE — 99999 PR PBB SHADOW E&M-EST. PATIENT-LVL IV: CPT | Mod: PBBFAC,,, | Performed by: INTERNAL MEDICINE

## 2022-04-27 PROCEDURE — 86480 TB TEST CELL IMMUN MEASURE: CPT | Performed by: INTERNAL MEDICINE

## 2022-04-27 PROCEDURE — 99214 OFFICE O/P EST MOD 30 MIN: CPT | Mod: PBBFAC | Performed by: INTERNAL MEDICINE

## 2022-04-27 PROCEDURE — 1159F PR MEDICATION LIST DOCUMENTED IN MEDICAL RECORD: ICD-10-PCS | Mod: CPTII,,, | Performed by: INTERNAL MEDICINE

## 2022-04-27 PROCEDURE — 4010F ACE/ARB THERAPY RXD/TAKEN: CPT | Mod: CPTII,,, | Performed by: INTERNAL MEDICINE

## 2022-04-27 PROCEDURE — 3079F DIAST BP 80-89 MM HG: CPT | Mod: CPTII,,, | Performed by: INTERNAL MEDICINE

## 2022-04-27 PROCEDURE — 3008F PR BODY MASS INDEX (BMI) DOCUMENTED: ICD-10-PCS | Mod: CPTII,,, | Performed by: INTERNAL MEDICINE

## 2022-04-27 PROCEDURE — 99215 PR OFFICE/OUTPT VISIT, EST, LEVL V, 40-54 MIN: ICD-10-PCS | Mod: S$PBB,,, | Performed by: INTERNAL MEDICINE

## 2022-04-27 PROCEDURE — 3074F SYST BP LT 130 MM HG: CPT | Mod: CPTII,,, | Performed by: INTERNAL MEDICINE

## 2022-04-27 PROCEDURE — 86140 C-REACTIVE PROTEIN: CPT | Performed by: INTERNAL MEDICINE

## 2022-04-27 PROCEDURE — 85652 RBC SED RATE AUTOMATED: CPT | Performed by: INTERNAL MEDICINE

## 2022-04-27 PROCEDURE — 36415 COLL VENOUS BLD VENIPUNCTURE: CPT | Performed by: INTERNAL MEDICINE

## 2022-04-27 PROCEDURE — 1160F PR REVIEW ALL MEDS BY PRESCRIBER/CLIN PHARMACIST DOCUMENTED: ICD-10-PCS | Mod: CPTII,,, | Performed by: INTERNAL MEDICINE

## 2022-04-27 RX ORDER — SERTRALINE HYDROCHLORIDE 50 MG/1
100 TABLET, FILM COATED ORAL DAILY
COMMUNITY
Start: 2022-03-28 | End: 2023-03-30

## 2022-04-27 RX ORDER — ASPIRIN 325 MG
TABLET, DELAYED RELEASE (ENTERIC COATED) ORAL
COMMUNITY
Start: 2022-03-17

## 2022-04-27 RX ORDER — PREDNISONE 20 MG/1
20 TABLET ORAL DAILY
Qty: 60 TABLET | Refills: 0 | Status: SHIPPED | OUTPATIENT
Start: 2022-04-27 | End: 2022-11-10

## 2022-04-27 RX ORDER — FERROUS SULFATE TAB 325 MG (65 MG ELEMENTAL FE) 325 (65 FE) MG
TAB ORAL
COMMUNITY
Start: 2022-03-18

## 2022-04-27 RX ORDER — AMLODIPINE AND VALSARTAN 10; 320 MG/1; MG/1
1 TABLET ORAL DAILY
COMMUNITY
Start: 2022-04-26 | End: 2023-07-31

## 2022-04-27 NOTE — PROGRESS NOTES
RHEUMATOLOGY CLINIC FOLLOW UP VISIT  Chief complaints, HPI, ROS, EXAM, Assessment & Plans:-  Denise Sapp a 46 y.o. pleasant female comes in for follow-up visit.  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  She is on cytoxan  therapy after failing multiple medications along with Rituximab and high dose Imuran.   She is reporting some improvement at last. Noticing mild reduction in her pain, swelling, fluid and stiffness. On 40 mg daily prednisone. Completed one dose of cytoxan.  No neuropathy, abdominal ischemia, skin rash, ulcers.   Rheumatological review of system negative otherwise.  Physical examination shows mild synovitis over few PIP joints.  Tenderness all over.    1. Microscopic polyangiitis    2. Immunocompromised    3. Long term current use of immunosuppressive drug      Problem List Items Addressed This Visit     Microscopic polyangiitis - Primary    Relevant Medications    predniSONE (DELTASONE) 20 MG tablet    Long term current use of immunosuppressive drug    Immunocompromised           Severely progressive active microscopic polyangiitis on Cytoxan. Complete 6 doses - every 4 weeks. Then consider nB DMARD for maintenance vs every 6 months cytoxan infusion.    ESR/CRP in process. She has multiple components of autoimmunity in her disease including abnormal APL anitbodies, abnormal aldolase, abnormal ED, Abnormal LAC, Significantly elevated MPO ANCA    Reduce prednisone to 20 mg daily.    Compromised immune system secondary to autoimmune disease and use of immunosuppressive drugs. Monitor carefully for infections. Advised to get immediate medical care if any infection. Also advised strict adherence to age appropriate vaccinations and cancer screenings with PCP.   # Follow up in about 6 weeks (around 6/7/2022).    Medication List with  Changes/Refills   Current Medications    AMLODIPINE (NORVASC) 10 MG TABLET    Take 10 mg by mouth once daily.    AMLODIPINE-VALSARTAN (EXFORGE)  MG PER TABLET    Take 1 tablet by mouth once daily.    ASPIRIN 325 MG TABLET        BUMETANIDE (BUMEX) 2 MG TABLET    Take 2 mg by mouth 2 (two) times daily.    CARVEDILOL (COREG) 25 MG TABLET    Take 25 mg by mouth 2 (two) times daily.    CATAPRES-TTS-3 0.3 MG/24 HR    Place 1 patch onto the skin once a week.    CHOLECALCIFEROL, VITAMIN D3, 1,250 MCG (50,000 UNIT) CAPSULE    TAKE 1 CAPSULE BY MOUTH ONCE A WEEK FOR VIT. D DEFICIENCY    CLONAZEPAM (KLONOPIN) 1 MG TABLET    TAKE 1 TABLET BY MOUTH ONCE DAILY AT BEDTIME AS NEEDED FOR ANXIETY SLEEP    CLONIDINE (CATAPRES) 0.3 MG TABLET        ESCITALOPRAM OXALATE (LEXAPRO) 20 MG TABLET    TAKE 1 TABLET BY MOUTH ONCE DAILY FOR 30 DAYS    FEROSUL 325 MG (65 MG IRON) TAB TABLET    TAKE 2 TABLETS BY MOUTH DAILY FOR IRON DEFICIENCY    FUROSEMIDE (LASIX) 20 MG TABLET    Take 20 mg by mouth 2 (two) times daily.    LIDOCAINE (LIDODERM) 5 %    1 patch once daily.    MIRTAZAPINE (REMERON) 15 MG TABLET    Take 15 mg by mouth nightly.    OLMESARTAN (BENICAR) 20 MG TABLET    Take 20 mg by mouth every morning.    PANTOPRAZOLE (PROTONIX) 20 MG TABLET    Take 20 mg by mouth every morning.    POTASSIUM CHLORIDE SA (K-DUR,KLOR-CON) 20 MEQ TABLET    Take 1 tablet (20 mEq total) by mouth 2 (two) times daily.    PREGABALIN (LYRICA) 100 MG CAPSULE    Take 1 capsule (100 mg total) by mouth 3 (three) times daily.    SERTRALINE (ZOLOFT) 50 MG TABLET    Take 50 mg by mouth once daily.    SPIRONOLACTONE (ALDACTONE) 25 MG TABLET    Take 25 mg by mouth once daily.    TEMAZEPAM (RESTORIL) 15 MG CAP    TAKE 1 CAPSULE BY MOUTH ONCE DAILY AT BEDTIME AS NEEDED    TIZANIDINE (ZANAFLEX) 4 MG TABLET    TAKE 1 TABLET BY MOUTH AS NEEDED THREE TIMES A DAY IF NEEDED FOR PAIN    TRAMADOL (ULTRAM) 50 MG TABLET    Take 1 tablet (50 mg total) by mouth every 12  (twelve) hours as needed for Pain.    VALSARTAN (DIOVAN) 320 MG TABLET    Take 320 mg by mouth once daily.   Changed and/or Refilled Medications    Modified Medication Previous Medication    PREDNISONE (DELTASONE) 20 MG TABLET predniSONE (DELTASONE) 20 MG tablet       Take 1 tablet (20 mg total) by mouth once daily.    Take 2 tablets (40 mg total) by mouth once daily.   Discontinued Medications    DULOXETINE (CYMBALTA) 20 MG CAPSULE    Take 20 mg by mouth 2 (two) times daily.    IRBESARTAN-HYDROCHLOROTHIAZIDE (AVALIDE) 300-12.5 MG PER TABLET    Take 1 tablet by mouth once daily.    NIFEDIPINE (ADALAT CC) 30 MG TBSR        TRIAMTERENE-HYDROCHLOROTHIAZIDE 37.5-25 MG (MAXZIDE-25) 37.5-25 MG PER TABLET    Take 1 tablet by mouth every morning.       Past Medical History:   Diagnosis Date    Hypertension     Stroke            Past Surgical History:   Procedure Laterality Date     SECTION      TUBAL LIGATION          Social History     Tobacco Use    Smoking status: Never Smoker    Smokeless tobacco: Never Used   Substance Use Topics    Alcohol use: Not Currently       Family History   Problem Relation Age of Onset    Hypertension Mother     Cancer Father         esophegal       Review of patient's allergies indicates:   Allergen Reactions    Oranges [orange] Hives       Disclaimer: This note was prepared using voice recognition system and is likely to have sound alike errors and is not proof read.  Please call me with any questions.

## 2022-05-02 LAB
GAMMA INTERFERON BACKGROUND BLD IA-ACNC: 0.03 IU/ML
M TB IFN-G BLD-IMP: NEGATIVE
M TB IFN-G CD4+ BCKGRND COR BLD-ACNC: 0.07 IU/ML
M TB IFN-G CD4+CD8+ BCKGRND COR BLD-ACNC: 0.1 IU/ML
MITOGEN IGNF BCKGRD COR BLD-ACNC: >10 IU/ML

## 2022-05-03 ENCOUNTER — TELEPHONE (OUTPATIENT)
Dept: RHEUMATOLOGY | Facility: CLINIC | Age: 47
End: 2022-05-03
Payer: MEDICAID

## 2022-05-03 NOTE — TELEPHONE ENCOUNTER
----- Message from Marianne Waters MA sent at 5/3/2022  1:53 PM CDT -----  Dr LÓPEZ saw this pat on 4/27 and his notes say see back in 6 wks.  Nothing is scheduled., can you please find a spot for her. Thank you

## 2022-05-03 NOTE — TELEPHONE ENCOUNTER
Called and spoke to pt. Informed her that Dr. LÓPEZ and Mirta were both unavailable on the day he wanted us to schedule, but shazia could see her in infusion. Pt stated that was fine. Scheduled pt with Shazia for 10 am 6/3 after approving it with Shazia to see her in infusion.

## 2022-05-06 ENCOUNTER — INFUSION (OUTPATIENT)
Dept: INFUSION THERAPY | Facility: HOSPITAL | Age: 47
End: 2022-05-06
Attending: INTERNAL MEDICINE
Payer: MEDICAID

## 2022-05-06 VITALS
HEART RATE: 79 BPM | TEMPERATURE: 97 F | BODY MASS INDEX: 40.85 KG/M2 | OXYGEN SATURATION: 99 % | DIASTOLIC BLOOD PRESSURE: 99 MMHG | WEIGHT: 223.31 LBS | RESPIRATION RATE: 16 BRPM | SYSTOLIC BLOOD PRESSURE: 157 MMHG

## 2022-05-06 DIAGNOSIS — M31.7 MICROSCOPIC POLYANGIITIS: Primary | ICD-10-CM

## 2022-05-06 PROCEDURE — 96376 TX/PRO/DX INJ SAME DRUG ADON: CPT

## 2022-05-06 PROCEDURE — 96413 CHEMO IV INFUSION 1 HR: CPT

## 2022-05-06 PROCEDURE — 63600175 PHARM REV CODE 636 W HCPCS: Performed by: INTERNAL MEDICINE

## 2022-05-06 PROCEDURE — 25000003 PHARM REV CODE 250: Performed by: INTERNAL MEDICINE

## 2022-05-06 PROCEDURE — 96375 TX/PRO/DX INJ NEW DRUG ADDON: CPT

## 2022-05-06 RX ORDER — METHYLPREDNISOLONE SOD SUCC 125 MG
125 VIAL (EA) INJECTION
Status: COMPLETED | OUTPATIENT
Start: 2022-05-06 | End: 2022-05-06

## 2022-05-06 RX ORDER — METHYLPREDNISOLONE SOD SUCC 125 MG
125 VIAL (EA) INJECTION
Status: CANCELLED | OUTPATIENT
Start: 2022-05-07

## 2022-05-06 RX ORDER — HEPARIN 100 UNIT/ML
500 SYRINGE INTRAVENOUS
Status: CANCELLED | OUTPATIENT
Start: 2022-05-07

## 2022-05-06 RX ORDER — ONDANSETRON 2 MG/ML
4 INJECTION INTRAMUSCULAR; INTRAVENOUS ONCE
Status: COMPLETED | OUTPATIENT
Start: 2022-05-06 | End: 2022-05-06

## 2022-05-06 RX ORDER — SODIUM CHLORIDE 0.9 % (FLUSH) 0.9 %
10 SYRINGE (ML) INJECTION
Status: CANCELLED | OUTPATIENT
Start: 2022-05-07

## 2022-05-06 RX ORDER — ACETAMINOPHEN 325 MG/1
650 TABLET ORAL EVERY 4 HOURS PRN
Status: CANCELLED | OUTPATIENT
Start: 2022-05-07

## 2022-05-06 RX ORDER — ACETAMINOPHEN 325 MG/1
650 TABLET ORAL EVERY 4 HOURS PRN
Status: DISCONTINUED | OUTPATIENT
Start: 2022-05-06 | End: 2022-05-06 | Stop reason: HOSPADM

## 2022-05-06 RX ADMIN — ACETAMINOPHEN 650 MG: 325 TABLET ORAL at 01:05

## 2022-05-06 RX ADMIN — MESNA 600 MG: 100 INJECTION, SOLUTION INTRAVENOUS at 11:05

## 2022-05-06 RX ADMIN — SODIUM CHLORIDE 500 ML: 0.9 INJECTION, SOLUTION INTRAVENOUS at 09:05

## 2022-05-06 RX ADMIN — MESNA 600 MG: 100 INJECTION, SOLUTION INTRAVENOUS at 01:05

## 2022-05-06 RX ADMIN — SODIUM CHLORIDE 500 ML: 0.9 INJECTION, SOLUTION INTRAVENOUS at 01:05

## 2022-05-06 RX ADMIN — CYCLOPHOSPHAMIDE 1200 MG: 200 INJECTION, SOLUTION INTRAVENOUS at 11:05

## 2022-05-06 RX ADMIN — METHYLPREDNISOLONE SODIUM SUCCINATE 125 MG: 125 INJECTION, POWDER, FOR SOLUTION INTRAMUSCULAR; INTRAVENOUS at 11:05

## 2022-05-06 RX ADMIN — ONDANSETRON 4 MG: 2 INJECTION INTRAMUSCULAR; INTRAVENOUS at 01:05

## 2022-05-06 NOTE — PLAN OF CARE
Discussed plan of care with pt. Addressed any and ongoing concerns. Pt denies    Problem: Adult Inpatient Plan of Care  Goal: Plan of Care Review  Outcome: Ongoing, Progressing  Flowsheets (Taken 5/6/2022 1101)  Plan of Care Reviewed With: patient  Goal: Patient-Specific Goal (Individualized)  Outcome: Ongoing, Progressing  Goal: Absence of Hospital-Acquired Illness or Injury  Outcome: Ongoing, Progressing  Intervention: Identify and Manage Fall Risk  Flowsheets (Taken 5/6/2022 1101)  Safety Promotion/Fall Prevention: in recliner, wheels locked  Intervention: Prevent Infection  Flowsheets (Taken 5/6/2022 1101)  Infection Prevention:   hand hygiene promoted   equipment surfaces disinfected  Goal: Optimal Comfort and Wellbeing  Outcome: Ongoing, Progressing  Intervention: Provide Person-Centered Care  Flowsheets (Taken 5/6/2022 1101)  Trust Relationship/Rapport:   care explained   reassurance provided   choices provided   thoughts/feelings acknowledged   emotional support provided   empathic listening provided   questions answered   questions encouraged  Goal: Readiness for Transition of Care  Outcome: Ongoing, Progressing     Problem: Fatigue  Goal: Improved Activity Tolerance  Outcome: Ongoing, Progressing  Intervention: Promote Improved Energy  Flowsheets (Taken 5/6/2022 1101)  Sleep/Rest Enhancement: room darkened

## 2022-05-06 NOTE — NURSING
1310 pt c/o of a headache and slight nausea. 650 mg tylenol po and 4 mg zofran ivp given as prn ordered.   LOS; pt not in room at time of RD visit, nutrition hx deferred for this reason

## 2022-05-06 NOTE — NURSING
"Infusion # 2  Last dose- 4/8/22    Any:  -recent illness, infection, or antibiotic use in past week- denies  -open wounds or mouth sores- denies  -invasive procedures or surgeries in past 4 weeks or in upcoming 4 weeks- denies  -vaccinations in past week- denies  -any new symptoms/change in symptoms- denies  -chance you may be pregnant- denies      Recent labs? 5/6/22  Last Rheumatology provider visit- Seen on 4/27/22 by Dr. LÓPEZ     Pre-hydration 500 ml nacl over 2 hours.   Premeds 125 mg solumedrol ivp, mesna 600mg ivpb    Pt received 650 mg tylenol po for headache. 4 mg zofran ivp for "slight nausea"    Post infusion mesna 600 mg ivpb.  Post hydration 500 mg nacl  over 2 hours      1200 mg cytoxan administered IV at a 60 minute rate per orders; see MAR and vitals for more details. Tolerated well without adverse events, discharged and ambulatory out of clinic.       "

## 2022-05-12 DIAGNOSIS — M06.09 RHEUMATOID ARTHRITIS OF MULTIPLE SITES WITH NEGATIVE RHEUMATOID FACTOR: Primary | ICD-10-CM

## 2022-06-03 ENCOUNTER — INFUSION (OUTPATIENT)
Dept: INFUSION THERAPY | Facility: HOSPITAL | Age: 47
End: 2022-06-03
Attending: PHYSICIAN ASSISTANT
Payer: MEDICARE

## 2022-06-03 ENCOUNTER — OFFICE VISIT (OUTPATIENT)
Dept: RHEUMATOLOGY | Facility: CLINIC | Age: 47
End: 2022-06-03
Payer: MEDICAID

## 2022-06-03 VITALS
BODY MASS INDEX: 41.09 KG/M2 | HEIGHT: 62 IN | RESPIRATION RATE: 18 BRPM | OXYGEN SATURATION: 98 % | HEART RATE: 74 BPM | DIASTOLIC BLOOD PRESSURE: 89 MMHG | WEIGHT: 223.31 LBS | TEMPERATURE: 98 F | SYSTOLIC BLOOD PRESSURE: 148 MMHG

## 2022-06-03 DIAGNOSIS — Z79.899 LONG TERM CURRENT USE OF IMMUNOSUPPRESSIVE DRUG: ICD-10-CM

## 2022-06-03 DIAGNOSIS — M31.7 MICROSCOPIC POLYANGIITIS: Primary | ICD-10-CM

## 2022-06-03 DIAGNOSIS — D84.9 IMMUNOCOMPROMISED: ICD-10-CM

## 2022-06-03 DIAGNOSIS — M06.09 RHEUMATOID ARTHRITIS OF MULTIPLE SITES WITH NEGATIVE RHEUMATOID FACTOR: ICD-10-CM

## 2022-06-03 PROCEDURE — 99214 OFFICE O/P EST MOD 30 MIN: CPT | Mod: PBBFAC,25 | Performed by: PHYSICIAN ASSISTANT

## 2022-06-03 PROCEDURE — 99215 OFFICE O/P EST HI 40 MIN: CPT | Mod: S$PBB,,, | Performed by: PHYSICIAN ASSISTANT

## 2022-06-03 PROCEDURE — 1159F PR MEDICATION LIST DOCUMENTED IN MEDICAL RECORD: ICD-10-PCS | Mod: CPTII,,, | Performed by: PHYSICIAN ASSISTANT

## 2022-06-03 PROCEDURE — 1159F MED LIST DOCD IN RCRD: CPT | Mod: CPTII,,, | Performed by: PHYSICIAN ASSISTANT

## 2022-06-03 PROCEDURE — 99999 PR PBB SHADOW E&M-EST. PATIENT-LVL IV: ICD-10-PCS | Mod: PBBFAC,,, | Performed by: PHYSICIAN ASSISTANT

## 2022-06-03 PROCEDURE — 99417 PROLNG OP E/M EACH 15 MIN: CPT | Mod: S$PBB,,, | Performed by: PHYSICIAN ASSISTANT

## 2022-06-03 PROCEDURE — 1160F PR REVIEW ALL MEDS BY PRESCRIBER/CLIN PHARMACIST DOCUMENTED: ICD-10-PCS | Mod: CPTII,,, | Performed by: PHYSICIAN ASSISTANT

## 2022-06-03 PROCEDURE — 25000003 PHARM REV CODE 250: Performed by: INTERNAL MEDICINE

## 2022-06-03 PROCEDURE — 99417 PR PROLONGED SVC, OUTPT, W/WO DIRECT PT CONTACT,  EA ADDTL 15 MIN: ICD-10-PCS | Mod: S$PBB,,, | Performed by: PHYSICIAN ASSISTANT

## 2022-06-03 PROCEDURE — 4010F PR ACE/ARB THEARPY RXD/TAKEN: ICD-10-PCS | Mod: CPTII,,, | Performed by: PHYSICIAN ASSISTANT

## 2022-06-03 PROCEDURE — 4010F ACE/ARB THERAPY RXD/TAKEN: CPT | Mod: CPTII,,, | Performed by: PHYSICIAN ASSISTANT

## 2022-06-03 PROCEDURE — 96376 TX/PRO/DX INJ SAME DRUG ADON: CPT

## 2022-06-03 PROCEDURE — 99215 PR OFFICE/OUTPT VISIT, EST, LEVL V, 40-54 MIN: ICD-10-PCS | Mod: S$PBB,,, | Performed by: PHYSICIAN ASSISTANT

## 2022-06-03 PROCEDURE — 96413 CHEMO IV INFUSION 1 HR: CPT

## 2022-06-03 PROCEDURE — 99999 PR PBB SHADOW E&M-EST. PATIENT-LVL IV: CPT | Mod: PBBFAC,,, | Performed by: PHYSICIAN ASSISTANT

## 2022-06-03 PROCEDURE — 63600175 PHARM REV CODE 636 W HCPCS: Mod: JG | Performed by: INTERNAL MEDICINE

## 2022-06-03 PROCEDURE — 96375 TX/PRO/DX INJ NEW DRUG ADDON: CPT

## 2022-06-03 PROCEDURE — 1160F RVW MEDS BY RX/DR IN RCRD: CPT | Mod: CPTII,,, | Performed by: PHYSICIAN ASSISTANT

## 2022-06-03 RX ORDER — ONDANSETRON 4 MG/1
4 TABLET, FILM COATED ORAL EVERY 8 HOURS PRN
Qty: 30 TABLET | Refills: 1 | Status: SHIPPED | OUTPATIENT
Start: 2022-06-03

## 2022-06-03 RX ORDER — METHYLPREDNISOLONE SOD SUCC 125 MG
125 VIAL (EA) INJECTION
Status: CANCELLED | OUTPATIENT
Start: 2022-07-01

## 2022-06-03 RX ORDER — SODIUM CHLORIDE 0.9 % (FLUSH) 0.9 %
10 SYRINGE (ML) INJECTION
Status: CANCELLED | OUTPATIENT
Start: 2022-07-01

## 2022-06-03 RX ORDER — METHYLPREDNISOLONE SOD SUCC 125 MG
125 VIAL (EA) INJECTION
Status: COMPLETED | OUTPATIENT
Start: 2022-06-03 | End: 2022-06-03

## 2022-06-03 RX ORDER — HYDROCODONE BITARTRATE AND ACETAMINOPHEN 5; 325 MG/1; MG/1
1 TABLET ORAL EVERY 6 HOURS PRN
Qty: 28 TABLET | Refills: 0 | Status: SHIPPED | OUTPATIENT
Start: 2022-06-03 | End: 2022-10-12

## 2022-06-03 RX ORDER — HEPARIN 100 UNIT/ML
500 SYRINGE INTRAVENOUS
Status: CANCELLED | OUTPATIENT
Start: 2022-07-01

## 2022-06-03 RX ORDER — ACETAMINOPHEN 325 MG/1
650 TABLET ORAL EVERY 4 HOURS PRN
Status: CANCELLED | OUTPATIENT
Start: 2022-07-01

## 2022-06-03 RX ADMIN — CYCLOPHOSPHAMIDE 1200 MG: 200 INJECTION, SOLUTION INTRAVENOUS at 11:06

## 2022-06-03 RX ADMIN — MESNA 600 MG: 100 INJECTION, SOLUTION INTRAVENOUS at 11:06

## 2022-06-03 RX ADMIN — METHYLPREDNISOLONE SODIUM SUCCINATE 125 MG: 125 INJECTION, POWDER, FOR SOLUTION INTRAMUSCULAR; INTRAVENOUS at 11:06

## 2022-06-03 RX ADMIN — SODIUM CHLORIDE 500 ML: 0.9 INJECTION, SOLUTION INTRAVENOUS at 09:06

## 2022-06-03 RX ADMIN — MESNA 600 MG: 100 INJECTION, SOLUTION INTRAVENOUS at 12:06

## 2022-06-03 RX ADMIN — SODIUM CHLORIDE 500 ML: 0.9 INJECTION, SOLUTION INTRAVENOUS at 01:06

## 2022-06-03 NOTE — PATIENT INSTRUCTIONS
Stop Tramadol  Norco 5 - one tablet every 12 hours as needed   1. Its written as every 6 hours so I can give you more   2. Use for severe pain only   3. As with any narcotic, there is risk for addiction.  Please use judiciously.   4. Caution w/ temazepam and clonazepam.  Combo w Norco can cause       respiratory depression, lethargy, low blood pressure, dizziness  Zofran for nausea as needed

## 2022-06-03 NOTE — PROGRESS NOTES
Subjective:      Patient ID: Denise Flower is a 47 y.o. female.    Chief Complaint: No chief complaint on file.      HPI   Denise Flower  is a 47 y.o. female was established within the department, but she is a new patient to my clinic.  She has a very complex presentation of vasculitis with microscopic poly angiitis and inflammatory arthritis.  She has failed both Rituxan and high-dose Imuran in the past.  Currently on Cytoxan and prednisone.  She most recently saw Dr. LÓPEZ. he has her on Cytoxan every 4 weeks for 6 cycles.  She is getting her 3rd cycle today.  At last visit he reduced prednisone to 20 mg daily.  Not sure if reduction in prednisone has affected her overall pain.  However she says she does not like the higher doses because it makes her feel very bloated and has caused her to gain weight.  She has Lyrica 100 mg b.i.d. ordered.    As far as pain goes, she started to feel worse towards the end of the month when she is getting close to her next Cytoxan infusion.  She also seems to note difference as with weather changes.  Mainly shoulders are hurting today.  Overall she is feeling better than she did when she 1st started Cytoxan.  She is tolerating it well, but does complain of some nausea.  She has Zofran ordered p.r.n. for infusion.  She does not have any Zofran at home.    Pain at times 10/10.  No relief with tramadol.  Minimal relief with Lyrica.    Patient denies fevers, chills, photophobia, eye pain, shortness of breath, chest pain, hematuria, blood in the stool, rash, ulcerations.  Rheumatologic systems otherwise negative.    Serologies/Labs:  · Negative RF, CCP, HLA B27  · (+) ED 1:1280 homogenous, negative profile  · (+) DRVVT  · (+) MPO, ANCA, cardiolipin antibody  · Negative proteinase 3  · No abnormality w SPEP/SIDDHARTH 12/2021  · CK WNL 3/30/21  · Aldolase 10.1 - 3/30/21  Current Treatment:  · Cytoxan q 4 weeks - inf #3 today  Previous Treatment:   · Rituxan      Current Outpatient  Medications:     amLODIPine (NORVASC) 10 MG tablet, Take 10 mg by mouth once daily., Disp: , Rfl:     amlodipine-valsartan (EXFORGE)  mg per tablet, Take 1 tablet by mouth once daily., Disp: , Rfl:     aspirin 325 MG tablet, , Disp: , Rfl:     bumetanide (BUMEX) 2 MG tablet, Take 2 mg by mouth 2 (two) times daily., Disp: , Rfl:     carvediloL (COREG) 25 MG tablet, Take 25 mg by mouth 2 (two) times daily., Disp: , Rfl:     CATAPRES-TTS-3 0.3 mg/24 hr, Place 1 patch onto the skin once a week., Disp: , Rfl:     cholecalciferol, vitamin D3, 1,250 mcg (50,000 unit) capsule, TAKE 1 CAPSULE BY MOUTH ONCE A WEEK FOR VIT. D DEFICIENCY, Disp: , Rfl:     clonazePAM (KLONOPIN) 1 MG tablet, TAKE 1 TABLET BY MOUTH ONCE DAILY AT BEDTIME AS NEEDED FOR ANXIETY SLEEP, Disp: , Rfl:     cloNIDine (CATAPRES) 0.3 MG tablet, , Disp: , Rfl:     escitalopram oxalate (LEXAPRO) 20 MG tablet, TAKE 1 TABLET BY MOUTH ONCE DAILY FOR 30 DAYS, Disp: , Rfl:     FEROSUL 325 mg (65 mg iron) Tab tablet, TAKE 2 TABLETS BY MOUTH DAILY FOR IRON DEFICIENCY, Disp: , Rfl:     furosemide (LASIX) 20 MG tablet, Take 20 mg by mouth 2 (two) times daily., Disp: , Rfl:     HYDROcodone-acetaminophen (NORCO) 5-325 mg per tablet, Take 1 tablet by mouth every 6 (six) hours as needed for Pain., Disp: 28 tablet, Rfl: 0    LIDOcaine (LIDODERM) 5 %, 1 patch once daily., Disp: , Rfl:     mirtazapine (REMERON) 15 MG tablet, Take 15 mg by mouth nightly., Disp: , Rfl:     olmesartan (BENICAR) 20 MG tablet, Take 20 mg by mouth every morning., Disp: , Rfl:     ondansetron (ZOFRAN) 4 MG tablet, Take 1 tablet (4 mg total) by mouth every 8 (eight) hours as needed for Nausea., Disp: 30 tablet, Rfl: 1    pantoprazole (PROTONIX) 20 MG tablet, Take 20 mg by mouth every morning., Disp: , Rfl:     potassium chloride SA (K-DUR,KLOR-CON) 20 MEQ tablet, Take 1 tablet (20 mEq total) by mouth 2 (two) times daily., Disp: 60 tablet, Rfl: 6    predniSONE (DELTASONE) 20  MG tablet, Take 1 tablet (20 mg total) by mouth once daily., Disp: 60 tablet, Rfl: 0    sertraline (ZOLOFT) 50 MG tablet, Take 50 mg by mouth once daily., Disp: , Rfl:     spironolactone (ALDACTONE) 25 MG tablet, Take 25 mg by mouth once daily., Disp: , Rfl:     temazepam (RESTORIL) 15 mg Cap, TAKE 1 CAPSULE BY MOUTH ONCE DAILY AT BEDTIME AS NEEDED, Disp: , Rfl:     tiZANidine (ZANAFLEX) 4 MG tablet, TAKE 1 TABLET BY MOUTH AS NEEDED THREE TIMES A DAY IF NEEDED FOR PAIN, Disp: , Rfl:     valsartan (DIOVAN) 320 MG tablet, Take 320 mg by mouth once daily., Disp: , Rfl:   No current facility-administered medications for this visit.    Facility-Administered Medications Ordered in Other Visits:     cyclophosphamide 1,200 mg in sodium chloride 0.9% 256 mL chemo infusion, 1,200 mg, Intravenous, 1 time in Clinic/HOD, Ruperto Noguera MD    mesna (MESNEX) 600 mg in sodium chloride 0.9% 100 mL infusion, 600 mg, Intravenous, 1 time in Clinic/HOD, Ruperto Noguera MD    mesna (MESNEX) 600 mg in sodium chloride 0.9% 100 mL infusion, 600 mg, Intravenous, 1 time in Clinic/HOD, Ruperto Noguera MD    methylPREDNISolone sodium succinate injection 125 mg, 125 mg, Intravenous, 1 time in Clinic/HOD, Rupetro Noguera MD    sodium chloride 0.9% bolus 500 mL, 500 mL, Intravenous, Continuous, Ruperto Noguera MD, Last Rate: 250 mL/hr at 06/03/22 0906, 500 mL at 06/03/22 0906    sodium chloride 0.9% bolus 500 mL, 500 mL, Intravenous, 1 time in Clinic/HOD, Ruperto Noguera MD    Past Medical History:   Diagnosis Date    Hypertension     Stroke     2010     Family History   Problem Relation Age of Onset    Hypertension Mother     Cancer Father         esophegal     Social History     Socioeconomic History    Marital status: Single   Tobacco Use    Smoking status: Never Smoker    Smokeless tobacco: Never Used   Substance and Sexual Activity    Alcohol use: Not Currently     Review of  patient's allergies indicates:   Allergen Reactions    Oranges [orange] Hives       Objective:   There were no vitals taken for this visit.  Immunization History   Administered Date(s) Administered    COVID-19, MRNA, LN-S, PF (MODERNA FULL 0.5 ML DOSE) 06/23/2021    DTaP 1973, 1973, 1973, 09/05/1974, 08/10/1978    IPV 1973, 1973, 1973, 09/05/1974, 08/10/1978    Influenza - Quadrivalent - PF *Preferred* (6 months and older) 12/01/2020    MMR 01/14/2004    Measles / Rubella 05/23/1974    Rubella 08/10/1978    Td (ADULT) 06/04/1987, 06/11/1998, 04/09/2009       Physical Exam   Constitutional: She is oriented to person, place, and time. No distress.   HENT:   Head: Normocephalic and atraumatic.   Pulmonary/Chest: Effort normal.   Abdominal: She exhibits no distension.   Musculoskeletal:         General: No swelling or tenderness. Normal range of motion.      Cervical back: Normal range of motion.   Lymphadenopathy:     She has no cervical adenopathy.   Neurological: She is alert and oriented to person, place, and time.   Skin: Skin is warm and dry. No rash noted.   Psychiatric: Mood normal.   Nursing note and vitals reviewed.  Mild synovitis multiple PIP jt  Swelling bilat hands/feet  Mild tenderness shoulders    Recent Results (from the past 672 hour(s))   Urinalysis    Collection Time: 06/03/22  7:25 AM   Result Value Ref Range    Specimen UA Urine, Clean Catch     Color, UA Yellow Yellow, Straw, Carolann    Appearance, UA Clear Clear    pH, UA 7.0 5.0 - 8.0    Specific Gravity, UA 1.015 1.005 - 1.030    Protein, UA 2+ (A) Negative    Glucose, UA Negative Negative    Ketones, UA Negative Negative    Bilirubin (UA) Negative Negative    Occult Blood UA Negative Negative    Nitrite, UA Negative Negative    Leukocytes, UA Negative Negative   Urinalysis Microscopic    Collection Time: 06/03/22  7:25 AM   Result Value Ref Range    RBC, UA 1 0 - 4 /hpf    WBC, UA 1 0 - 5 /hpf     Bacteria Rare None-Occ /hpf    Squam Epithel, UA 3 /hpf    Hyaline Casts, UA 0 0-1/lpf /lpf    Microscopic Comment SEE COMMENT    CBC Auto Differential    Collection Time: 06/03/22  7:38 AM   Result Value Ref Range    WBC 4.47 3.90 - 12.70 K/uL    RBC 4.11 4.00 - 5.40 M/uL    Hemoglobin 11.4 (L) 12.0 - 16.0 g/dL    Hematocrit 35.7 (L) 37.0 - 48.5 %    MCV 87 82 - 98 fL    MCH 27.7 27.0 - 31.0 pg    MCHC 31.9 (L) 32.0 - 36.0 g/dL    RDW 14.2 11.5 - 14.5 %    Platelets 314 150 - 450 K/uL    MPV 11.2 9.2 - 12.9 fL    Immature Granulocytes 0.2 0.0 - 0.5 %    Gran # (ANC) 1.8 1.8 - 7.7 K/uL    Immature Grans (Abs) 0.01 0.00 - 0.04 K/uL    Lymph # 1.6 1.0 - 4.8 K/uL    Mono # 0.9 0.3 - 1.0 K/uL    Eos # 0.1 0.0 - 0.5 K/uL    Baso # 0.03 0.00 - 0.20 K/uL    nRBC 0 0 /100 WBC    Gran % 40.7 38.0 - 73.0 %    Lymph % 36.7 18.0 - 48.0 %    Mono % 19.0 (H) 4.0 - 15.0 %    Eosinophil % 2.7 0.0 - 8.0 %    Basophil % 0.7 0.0 - 1.9 %    Differential Method Automated    Comprehensive Metabolic Panel    Collection Time: 06/03/22  7:38 AM   Result Value Ref Range    Sodium 142 136 - 145 mmol/L    Potassium 3.1 (L) 3.5 - 5.1 mmol/L    Chloride 102 95 - 110 mmol/L    CO2 28 23 - 29 mmol/L    Glucose 115 (H) 70 - 110 mg/dL    BUN 11 6 - 20 mg/dL    Creatinine 0.9 0.5 - 1.4 mg/dL    Calcium 8.9 8.7 - 10.5 mg/dL    Total Protein 6.7 6.0 - 8.4 g/dL    Albumin 3.5 3.5 - 5.2 g/dL    Total Bilirubin 0.4 0.1 - 1.0 mg/dL    Alkaline Phosphatase 69 55 - 135 U/L    AST 17 10 - 40 U/L    ALT 17 10 - 44 U/L    Anion Gap 12 8 - 16 mmol/L    eGFR if African American >60 >60 mL/min/1.73 m^2    eGFR if non African American >60 >60 mL/min/1.73 m^2   C-Reactive Protein    Collection Time: 06/03/22  7:38 AM   Result Value Ref Range    CRP 20.5 (H) 0.0 - 8.2 mg/L       Lab Results   Component Value Date    TBGOLDPLUS Negative 04/27/2022      Lab Results   Component Value Date    HEPBCAB Negative 10/12/2020    HEPCAB Negative 10/12/2020        Assessment:      1. Microscopic polyangiitis    2. Immunocompromised    3. Long term current use of immunosuppressive drug    4. Rheumatoid arthritis of multiple sites with negative rheumatoid factor            Plan:     Diagnoses and all orders for this visit:    Microscopic polyangiitis  -     HYDROcodone-acetaminophen (NORCO) 5-325 mg per tablet; Take 1 tablet by mouth every 6 (six) hours as needed for Pain.  -     ondansetron (ZOFRAN) 4 MG tablet; Take 1 tablet (4 mg total) by mouth every 8 (eight) hours as needed for Nausea.    Immunocompromised    Long term current use of immunosuppressive drug    Rheumatoid arthritis of multiple sites with negative rheumatoid factor        · Microscopic polyangiitis  · Continue Cytoxan every 4 weeks for a total of 6 infusions  · Continue current prednisone dose  · zofran prn  · Norco 5 1 twice daily p.r.n..  Use very sparingly.  All precautions advised  · Slightly higher CRP this time than last.  ESR pending  · Multiple components of autoimmunity including abnormal APL antibodies, elevated aldolase, positive ED, abnormal LAC, significantly elevated MPO and ANCA antibodies  · Drug therapy requiring intensive monitoring for toxicity  · High Risk Medication Monitoring encounter  · No current medication related issues, no evidence of toxicity  · I ordered labs for toxicity monitoring, have personally reviewed the findings, and discussed them with the patient.  Pending labs will be sent via the portal  · Compromised immune system secondary to autoimmune disease and/or use of immunosuppressive drugs.  Monitor carefully for infections.  Advised patient to get immediate medical care if any infection arises.  Also advised strict adherence age-appropriate vaccinations and cancer screenings with PCP.  · Patient advised to hold DMARD and/or biologic therapy for signs of infection or for surgery. If you are unsure what to do please call our office for instruction.[facility]  [phone]  · Return to clinic: 12 weeks w labs    75 minutes of total time spent on the encounter, which includes face to face time and non-face to face time preparing to see the patient (eg, review of tests), Obtaining and/or reviewing separately obtained history, Documenting clinical information in the electronic or other health record, Independently interpreting results (not separately reported) and communicating results to the patient/family/caregiver, or Care coordination (not separately reported).       Follow up in about 3 months (around 9/3/2022).    The patient understands, chooses and consents to this plan and accepts all   the risks which include but are not limited to the risks mentioned above.     Disclaimer: This note was prepared using a voice recognition system and is likely to have sound alike errors within the text.

## 2022-06-03 NOTE — PLAN OF CARE
Pt is stable. Pt voiced she was tired today. Pt administered Cytoxan. Pt will follow up in 8 weeks.   Problem: Fall Injury Risk  Goal: Absence of Fall and Fall-Related Injury  Outcome: Ongoing, Progressing     Problem: Infection  Goal: Absence of Infection Signs and Symptoms  Outcome: Ongoing, Progressing     Problem: Anemia (Chemotherapy Effects)  Goal: Anemia Symptom Improvement  Outcome: Ongoing, Progressing     Problem: Urinary Bleeding Risk or Actual (Chemotherapy Effects)  Goal: Absence of Hematuria  Outcome: Ongoing, Progressing     Problem: Nausea and Vomiting (Chemotherapy Effects)  Goal: Fluid and Electrolyte Balance  Outcome: Ongoing, Progressing     Problem: Neurotoxicity (Chemotherapy Effects)  Goal: Neurotoxicity Symptom Control  Outcome: Ongoing, Progressing     Problem: Neutropenia (Chemotherapy Effects)  Goal: Absence of Infection  Outcome: Ongoing, Progressing     Problem: Oral Mucositis (Chemotherapy Effects)  Goal: Improved Oral Mucous Membrane Integrity  Outcome: Ongoing, Progressing     Problem: Thrombocytopenia Bleeding Risk (Chemotherapy Effects)  Goal: Absence of Bleeding  Outcome: Ongoing, Progressing     Problem: Adult Inpatient Plan of Care  Goal: Plan of Care Review  Outcome: Ongoing, Progressing  Goal: Patient-Specific Goal (Individualized)  Outcome: Ongoing, Progressing  Flowsheets (Taken 6/3/2022 0921)  Anxieties, Fears or Concerns: Pt denies any.  Individualized Care Needs: Pt provided pillow, warm blanket and legs elevated in reclined position. Apple juice and ly grams provided.  Patient-Specific Goals (Include Timeframe): Tolerate treatment as scheduled.

## 2022-06-03 NOTE — Clinical Note
So she is very complex.  CRP higher today than previous.  Feeling better on Rituxan, but symptoms start to worsen several days prior to the next infusion.  Also worsening with weather changes.  On prednisone 20 mg. Pain gets very severe at times as to where she can not even move her walk.  Tramadol no help.  Lyrica limitedly beneficial.  I gave her some Norco for severe pain only.  After today, she still has 3 infusions of Cytoxan.  Says she may have felt better on slightly higher dose of steroids but she did not like being on that high of a dose because of weight gain and puffiness.  Other than reading up on microscopic polyangiitis and the vasculotides I am not sure what to add today if anything.  Labs are okay.  PC ratio is pending, she has no shortness of breath.

## 2022-06-30 ENCOUNTER — TELEPHONE (OUTPATIENT)
Dept: RHEUMATOLOGY | Facility: CLINIC | Age: 47
End: 2022-06-30
Payer: MEDICARE

## 2022-06-30 NOTE — TELEPHONE ENCOUNTER
Pt reports new dx of conjunctivitis. PCP started antibiotic tx today (6/30). Pt will f/u with PCP on 7/7.   R/S cytoxan infusion 4 of 6  to 7/14/22.

## 2022-06-30 NOTE — TELEPHONE ENCOUNTER
----- Message from Xiomara Madrid MA sent at 6/30/2022  8:07 AM CDT -----  Contact: Patient    ----- Message -----  From: Ekta Magdaleno  Sent: 6/30/2022   8:03 AM CDT  To: [facility] Rheumatology Infusion Clinical Support    Patient would like Karly to give her a call back at Ph .[phone] (home)

## 2022-07-11 ENCOUNTER — INFUSION (OUTPATIENT)
Dept: INFUSION THERAPY | Facility: HOSPITAL | Age: 47
End: 2022-07-11
Attending: INTERNAL MEDICINE
Payer: MEDICAID

## 2022-07-11 VITALS
OXYGEN SATURATION: 97 % | HEIGHT: 62 IN | HEART RATE: 65 BPM | BODY MASS INDEX: 39.63 KG/M2 | RESPIRATION RATE: 16 BRPM | TEMPERATURE: 98 F | DIASTOLIC BLOOD PRESSURE: 68 MMHG | WEIGHT: 215.38 LBS | SYSTOLIC BLOOD PRESSURE: 105 MMHG

## 2022-07-11 DIAGNOSIS — M31.7 MICROSCOPIC POLYANGIITIS: Primary | ICD-10-CM

## 2022-07-11 PROCEDURE — 63600175 PHARM REV CODE 636 W HCPCS: Performed by: PHYSICIAN ASSISTANT

## 2022-07-11 PROCEDURE — 96367 TX/PROPH/DG ADDL SEQ IV INF: CPT

## 2022-07-11 PROCEDURE — 63600175 PHARM REV CODE 636 W HCPCS: Mod: JG | Performed by: INTERNAL MEDICINE

## 2022-07-11 PROCEDURE — 96375 TX/PRO/DX INJ NEW DRUG ADDON: CPT

## 2022-07-11 PROCEDURE — 25000003 PHARM REV CODE 250: Performed by: INTERNAL MEDICINE

## 2022-07-11 PROCEDURE — 96413 CHEMO IV INFUSION 1 HR: CPT

## 2022-07-11 RX ORDER — HEPARIN 100 UNIT/ML
500 SYRINGE INTRAVENOUS
Status: CANCELLED | OUTPATIENT
Start: 2022-07-25

## 2022-07-11 RX ORDER — METHYLPREDNISOLONE SOD SUCC 125 MG
125 VIAL (EA) INJECTION
Status: CANCELLED
Start: 2022-07-11

## 2022-07-11 RX ORDER — METHYLPREDNISOLONE SOD SUCC 125 MG
125 VIAL (EA) INJECTION
Status: COMPLETED | OUTPATIENT
Start: 2022-07-11 | End: 2022-07-11

## 2022-07-11 RX ORDER — SODIUM CHLORIDE 0.9 % (FLUSH) 0.9 %
10 SYRINGE (ML) INJECTION
Status: CANCELLED | OUTPATIENT
Start: 2022-07-25

## 2022-07-11 RX ORDER — METHYLPREDNISOLONE SOD SUCC 125 MG
125 VIAL (EA) INJECTION
Status: CANCELLED
Start: 2022-08-08

## 2022-07-11 RX ORDER — ACETAMINOPHEN 325 MG/1
650 TABLET ORAL EVERY 4 HOURS PRN
Status: CANCELLED | OUTPATIENT
Start: 2022-07-25

## 2022-07-11 RX ADMIN — MESNA 600 MG: 100 INJECTION, SOLUTION INTRAVENOUS at 11:07

## 2022-07-11 RX ADMIN — METHYLPREDNISOLONE SODIUM SUCCINATE 125 MG: 125 INJECTION, POWDER, FOR SOLUTION INTRAMUSCULAR; INTRAVENOUS at 10:07

## 2022-07-11 RX ADMIN — SODIUM CHLORIDE 500 ML: 0.9 INJECTION, SOLUTION INTRAVENOUS at 11:07

## 2022-07-11 RX ADMIN — SODIUM CHLORIDE 500 ML: 0.9 INJECTION, SOLUTION INTRAVENOUS at 08:07

## 2022-07-11 RX ADMIN — CYCLOPHOSPHAMIDE 1200 MG: 200 INJECTION, SOLUTION INTRAVENOUS at 10:07

## 2022-07-11 RX ADMIN — MESNA 600 MG: 100 INJECTION, SOLUTION INTRAVENOUS at 10:07

## 2022-07-11 NOTE — DISCHARGE INSTRUCTIONS
HOME CARE AFTER CHEMOTHERAPY   Meals   Many patients feel sick and lose their appetites during treatment. Eat small meals several times a day. Choose bland foods with little taste or smell if you have problems with nausea. Be sure to cook all food thoroughly. This kills bacteria and helps you avoid intestinal infection. Soft foods are easier to swallow and digest.   Activity   Exercise keeps you strong and keeps your heart and lungs active. Talk to your doctor about an appropriate exercise program for you.   Skin Care   To prevent a skin infection, bathe or shower once a day. Use a moisturizing soap and wash with warm water. Avoid very hot or cold water. Chemotherapy can make your skin dry . Apply moisturizing lotion to help relieve dry skin. Some drugs used in high doses can cause slight burns to appear (like sunburn). Ask for a special cream to help relieve the burn and protect your skin.   Prevent Mouth Sores   During chemotherapy, many people get mouth sores. Do the following to help prevent mouth sores or to ease discomfort.   Brush your teeth with a soft-bristle toothbrush after every meal.  Don't use dental floss if your platelet count is below 50,000. Your doctor or nurse will tell you if this is the case.  Use an oral swab or special soft toothbrush if your gums bleed during regular brushing.  Use mouthwash as directed. If you can't tolerate commercial mouthwash, use salt and baking soda to clean your mouth. Mix 1 teaspoon of salt and 1 teaspoon of baking soda into a glass of water. Swish and spit.  Call your doctor or return to this facility if you develop any of the following:   Sore throat   White patches in the mouth or throat   Fever of 100.4ºF (38ºC) or higher, or as directed by your healthcare provider  © 8342-0617 [employer], [address] [place] [address], [place] [address]. All rights reserved. This information is not intended as a substitute for professional medical care. Always follow your  healthcare professional's      FALL PREVENTION   Falls often occur due to slipping, tripping or losing your balance. Here are ways to reduce your risk of falling again.   Was there anything that caused your fall that can be fixed, removed or replaced?   Make your home safe by keeping walkways clear of objects you may trip over.   Use non-slip pads under rugs.   Do not walk in poorly lit areas.   Do not stand on chairs or wobbly ladders.   Use caution when reaching overhead or looking upward. This position can cause a loss of balance.   Be sure your shoes fit properly, have non-slip bottoms and are in good condition.   Be cautious when going up and down stairs, curbs, and when walking on uneven sidewalks.   If your balance is poor, consider using a cane or walker.   If your fall was related to alcohol use, stop or limit alcohol intake.   If your fall was related to use of sleeping medicines, talk to your doctor about this. You may need to reduce your dosage at bedtime if you awaken during the night to go to the bathroom.   To reduce the need for nighttime bathroom trips:   Avoid drinking fluids for several hours before going to bed   Empty your bladder before going to bed   Men can keep a urinal at the bedside   © 5844-9437 [employer], [address] [place] [address], [place] [id number]. All rights reserved. This information is not intended as a substitute for professional medical care. Always follow your healthcare professional's instructions.     WAYS TO HELP PREVENT INFECTION        WASH YOUR HANDS OFTEN DURING THE DAY, ESPECIALLY BEFORE YOU EAT, AFTER USING THE BATHROOM, AND AFTER TOUCHING ANIMALS    STAY AWAY FROM PEOPLE WHO HAVE ILLNESSES YOU CAN CATCH; SUCH AS COLDS, FLU, CHICKEN POX    TRY TO AVOID CROWDS    STAY AWAY FROM CHILDREN WHO RECENTLY HAVE RECEIVED LIVE VIRUS VACCINES    MAINTAIN GOOD MOUTH CARE    DO NOT SQUEEZE OR SCRATCH PIMPLES    CLEAN CUTS & SCRAPES RIGHT AWAY AND DAILY UNTIL HEALED WITH WARM  WATER, SOAP & AN ANTISEPTIC    AVOID CONTACT WITH LITTER BOXES, BIRD CAGES, & FISH TANKS    AVOID STANDING WATER, IE., BIRD BATHS, FLOWER POTS/VASES, OR HUMIDIFIERS    WEAR GLOVES WHEN GARDENING OR CLEANING UP AFTER OTHERS, ESPECIALLY BABIES & SMALL CHILDREN    DO NOT EAT RAW FISH, SEAFOOD, MEAT, OR EGGS

## 2022-08-01 PROBLEM — Z09 CHEMOTHERAPY FOLLOW-UP EXAMINATION: Status: RESOLVED | Noted: 2022-04-27 | Resolved: 2022-08-01

## 2022-08-15 ENCOUNTER — LAB VISIT (OUTPATIENT)
Dept: LAB | Facility: HOSPITAL | Age: 47
End: 2022-08-15
Attending: PHYSICIAN ASSISTANT
Payer: MEDICAID

## 2022-08-15 ENCOUNTER — TELEPHONE (OUTPATIENT)
Dept: RHEUMATOLOGY | Facility: CLINIC | Age: 47
End: 2022-08-15
Payer: MEDICARE

## 2022-08-15 ENCOUNTER — INFUSION (OUTPATIENT)
Dept: INFUSION THERAPY | Facility: HOSPITAL | Age: 47
End: 2022-08-15
Attending: INTERNAL MEDICINE
Payer: MEDICARE

## 2022-08-15 VITALS
BODY MASS INDEX: 39.88 KG/M2 | HEIGHT: 62 IN | TEMPERATURE: 98 F | SYSTOLIC BLOOD PRESSURE: 141 MMHG | DIASTOLIC BLOOD PRESSURE: 87 MMHG | WEIGHT: 216.69 LBS | OXYGEN SATURATION: 99 % | RESPIRATION RATE: 18 BRPM | HEART RATE: 65 BPM

## 2022-08-15 DIAGNOSIS — M06.09 RHEUMATOID ARTHRITIS OF MULTIPLE SITES WITH NEGATIVE RHEUMATOID FACTOR: ICD-10-CM

## 2022-08-15 DIAGNOSIS — R80.0 ISOLATED PROTEINURIA WITHOUT SPECIFIC MORPHOLOGIC LESION: ICD-10-CM

## 2022-08-15 DIAGNOSIS — M31.7 MICROSCOPIC POLYANGIITIS: ICD-10-CM

## 2022-08-15 DIAGNOSIS — M31.7 MICROSCOPIC POLYANGIITIS: Primary | ICD-10-CM

## 2022-08-15 LAB
BILIRUB UR QL STRIP: NEGATIVE
CLARITY UR: CLEAR
COLOR UR: YELLOW
CREAT UR-MCNC: 31 MG/DL (ref 15–325)
GLUCOSE UR QL STRIP: NEGATIVE
HGB UR QL STRIP: NEGATIVE
KETONES UR QL STRIP: NEGATIVE
LEUKOCYTE ESTERASE UR QL STRIP: NEGATIVE
NITRITE UR QL STRIP: NEGATIVE
PH UR STRIP: 7 [PH] (ref 5–8)
PROT UR QL STRIP: ABNORMAL
PROT UR-MCNC: 21 MG/DL (ref 0–15)
PROT/CREAT UR: 0.68 MG/G{CREAT} (ref 0–0.2)
SP GR UR STRIP: 1.01 (ref 1–1.03)
URN SPEC COLLECT METH UR: ABNORMAL

## 2022-08-15 PROCEDURE — 96413 CHEMO IV INFUSION 1 HR: CPT

## 2022-08-15 PROCEDURE — 96376 TX/PRO/DX INJ SAME DRUG ADON: CPT

## 2022-08-15 PROCEDURE — 82570 ASSAY OF URINE CREATININE: CPT | Performed by: INTERNAL MEDICINE

## 2022-08-15 PROCEDURE — 81003 URINALYSIS AUTO W/O SCOPE: CPT | Performed by: INTERNAL MEDICINE

## 2022-08-15 PROCEDURE — 25000003 PHARM REV CODE 250: Performed by: INTERNAL MEDICINE

## 2022-08-15 PROCEDURE — 63600175 PHARM REV CODE 636 W HCPCS: Performed by: INTERNAL MEDICINE

## 2022-08-15 PROCEDURE — 63600175 PHARM REV CODE 636 W HCPCS: Performed by: PHYSICIAN ASSISTANT

## 2022-08-15 PROCEDURE — 96375 TX/PRO/DX INJ NEW DRUG ADDON: CPT

## 2022-08-15 RX ORDER — ONDANSETRON 2 MG/ML
4 INJECTION INTRAMUSCULAR; INTRAVENOUS EVERY 4 HOURS PRN
Status: CANCELLED
Start: 2022-09-05

## 2022-08-15 RX ORDER — METHYLPREDNISOLONE SOD SUCC 125 MG
125 VIAL (EA) INJECTION
Status: CANCELLED
Start: 2022-09-05

## 2022-08-15 RX ORDER — HEPARIN 100 UNIT/ML
500 SYRINGE INTRAVENOUS
Status: CANCELLED | OUTPATIENT
Start: 2022-09-05

## 2022-08-15 RX ORDER — ACETAMINOPHEN 325 MG/1
650 TABLET ORAL EVERY 4 HOURS PRN
Status: CANCELLED | OUTPATIENT
Start: 2022-09-05

## 2022-08-15 RX ORDER — SODIUM CHLORIDE 0.9 % (FLUSH) 0.9 %
10 SYRINGE (ML) INJECTION
Status: DISCONTINUED | OUTPATIENT
Start: 2022-08-15 | End: 2022-08-15 | Stop reason: HOSPADM

## 2022-08-15 RX ORDER — DIPHENHYDRAMINE HYDROCHLORIDE 50 MG/ML
25 INJECTION INTRAMUSCULAR; INTRAVENOUS
Status: COMPLETED | OUTPATIENT
Start: 2022-08-15 | End: 2022-08-15

## 2022-08-15 RX ORDER — ONDANSETRON 2 MG/ML
4 INJECTION INTRAMUSCULAR; INTRAVENOUS EVERY 4 HOURS PRN
Status: DISCONTINUED | OUTPATIENT
Start: 2022-08-15 | End: 2022-08-15 | Stop reason: HOSPADM

## 2022-08-15 RX ORDER — METHYLPREDNISOLONE SOD SUCC 125 MG
125 VIAL (EA) INJECTION
Status: COMPLETED | OUTPATIENT
Start: 2022-08-15 | End: 2022-08-15

## 2022-08-15 RX ORDER — DIPHENHYDRAMINE HYDROCHLORIDE 50 MG/ML
25 INJECTION INTRAMUSCULAR; INTRAVENOUS
Status: CANCELLED
Start: 2022-09-05

## 2022-08-15 RX ORDER — SODIUM CHLORIDE 0.9 % (FLUSH) 0.9 %
10 SYRINGE (ML) INJECTION
Status: CANCELLED | OUTPATIENT
Start: 2022-09-05

## 2022-08-15 RX ADMIN — MESNA 600 MG: 100 INJECTION, SOLUTION INTRAVENOUS at 10:08

## 2022-08-15 RX ADMIN — MESNA 600 MG: 100 INJECTION, SOLUTION INTRAVENOUS at 12:08

## 2022-08-15 RX ADMIN — ONDANSETRON 4 MG: 2 INJECTION INTRAMUSCULAR; INTRAVENOUS at 09:08

## 2022-08-15 RX ADMIN — METHYLPREDNISOLONE SODIUM SUCCINATE 125 MG: 125 INJECTION, POWDER, FOR SOLUTION INTRAMUSCULAR; INTRAVENOUS at 09:08

## 2022-08-15 RX ADMIN — DIPHENHYDRAMINE HYDROCHLORIDE 25 MG: 50 INJECTION, SOLUTION INTRAMUSCULAR; INTRAVENOUS at 09:08

## 2022-08-15 RX ADMIN — CYCLOPHOSPHAMIDE 1200 MG: 200 INJECTION, SOLUTION INTRAVENOUS at 11:08

## 2022-08-15 RX ADMIN — SODIUM CHLORIDE 500 ML: 0.9 INJECTION, SOLUTION INTRAVENOUS at 12:08

## 2022-08-15 RX ADMIN — SODIUM CHLORIDE 500 ML: 0.9 INJECTION, SOLUTION INTRAVENOUS at 09:08

## 2022-08-15 NOTE — TELEPHONE ENCOUNTER
----- Message from Maribel Nuñez MA sent at 8/15/2022  8:37 AM CDT -----  Good Morning,    9/12/2022 is a Monday, Dr LÓPEZ is in [facility]. Can you see if Shazia would like to add patient. Infusion scheduled for 8:45    -Maribel  ----- Message -----  From: Marianne Waters MA  Sent: 8/15/2022   7:22 AM CDT  To: [facility] Clinical Staff    Good Morning, This patient was seen on June and Shazia requested to see back in 12 wks. No appt scheduled. She is scheduled for her last treatment 9/12/22  can you get her on Shazia or DR LÓPEZ scheduled then or at some point.  Thank you  Marianne

## 2022-08-15 NOTE — TELEPHONE ENCOUNTER
"Good morning, Mrs. Richard "Shazia" JUANITA Cheney is out of the office today but I can check with her tomorrow to see if we can add her to the schedule.    Loly Bobo (Allye), [facility]  Rheumatology Department  " trisha all pertinent systems normal

## 2022-08-15 NOTE — NURSING
Infusion Cytoxan    Recent labs? Reviewed    Premeds? Solumedrol 125 mg IVP  Zofran 4 mg IVP  Benedry; 25 IVP    Pre-Hydration NS 0.9% 500 cc over 2 hours IV.   Mesna 600 mg IV over 15 minutes.    S/S of current or recent infections in the past 14 days? None/Denies    Recent Surgery/invasive procedures? None/Denies     Any recent vaccines ? None/Denies    Cytoxan 1200 mg administered IV at a 60 minute rate per orders; see MAR and vitals for more  Details.  Mesna 600 mg IV over 15 minutes.  Post hydration with 500 ml NS 0.9% IV over 2 hours.

## 2022-08-22 ENCOUNTER — PATIENT MESSAGE (OUTPATIENT)
Dept: RHEUMATOLOGY | Facility: CLINIC | Age: 47
End: 2022-08-22
Payer: MEDICARE

## 2022-09-12 ENCOUNTER — INFUSION (OUTPATIENT)
Dept: INFUSION THERAPY | Facility: HOSPITAL | Age: 47
End: 2022-09-12
Attending: PHYSICIAN ASSISTANT
Payer: MEDICARE

## 2022-09-12 ENCOUNTER — OFFICE VISIT (OUTPATIENT)
Dept: RHEUMATOLOGY | Facility: CLINIC | Age: 47
End: 2022-09-12
Payer: MEDICAID

## 2022-09-12 VITALS
RESPIRATION RATE: 18 BRPM | DIASTOLIC BLOOD PRESSURE: 88 MMHG | SYSTOLIC BLOOD PRESSURE: 139 MMHG | WEIGHT: 211.19 LBS | TEMPERATURE: 98 F | HEIGHT: 62 IN | HEART RATE: 70 BPM | BODY MASS INDEX: 38.86 KG/M2 | OXYGEN SATURATION: 98 %

## 2022-09-12 VITALS
HEART RATE: 65 BPM | DIASTOLIC BLOOD PRESSURE: 70 MMHG | HEIGHT: 62 IN | WEIGHT: 211.19 LBS | SYSTOLIC BLOOD PRESSURE: 113 MMHG | BODY MASS INDEX: 38.86 KG/M2

## 2022-09-12 DIAGNOSIS — M31.7 MICROSCOPIC POLYANGIITIS: ICD-10-CM

## 2022-09-12 DIAGNOSIS — D84.9 IMMUNOCOMPROMISED: ICD-10-CM

## 2022-09-12 DIAGNOSIS — M06.4 INFLAMMATORY POLYARTHRITIS: ICD-10-CM

## 2022-09-12 DIAGNOSIS — R82.71 BACTERIA IN URINE: Primary | ICD-10-CM

## 2022-09-12 DIAGNOSIS — M31.7 MICROSCOPIC POLYANGIITIS: Primary | ICD-10-CM

## 2022-09-12 DIAGNOSIS — M06.09 RHEUMATOID ARTHRITIS OF MULTIPLE SITES WITH NEGATIVE RHEUMATOID FACTOR: ICD-10-CM

## 2022-09-12 DIAGNOSIS — Z79.899 LONG TERM CURRENT USE OF IMMUNOSUPPRESSIVE DRUG: ICD-10-CM

## 2022-09-12 PROCEDURE — 3074F PR MOST RECENT SYSTOLIC BLOOD PRESSURE < 130 MM HG: ICD-10-PCS | Mod: CPTII,,, | Performed by: PHYSICIAN ASSISTANT

## 2022-09-12 PROCEDURE — 96375 TX/PRO/DX INJ NEW DRUG ADDON: CPT

## 2022-09-12 PROCEDURE — 99215 OFFICE O/P EST HI 40 MIN: CPT | Mod: S$PBB,,, | Performed by: PHYSICIAN ASSISTANT

## 2022-09-12 PROCEDURE — 99999 PR PBB SHADOW E&M-EST. PATIENT-LVL II: ICD-10-PCS | Mod: PBBFAC,,, | Performed by: PHYSICIAN ASSISTANT

## 2022-09-12 PROCEDURE — 99212 OFFICE O/P EST SF 10 MIN: CPT | Mod: PBBFAC,25 | Performed by: PHYSICIAN ASSISTANT

## 2022-09-12 PROCEDURE — 3008F PR BODY MASS INDEX (BMI) DOCUMENTED: ICD-10-PCS | Mod: CPTII,,, | Performed by: PHYSICIAN ASSISTANT

## 2022-09-12 PROCEDURE — 3074F SYST BP LT 130 MM HG: CPT | Mod: CPTII,,, | Performed by: PHYSICIAN ASSISTANT

## 2022-09-12 PROCEDURE — 63600175 PHARM REV CODE 636 W HCPCS: Performed by: PHYSICIAN ASSISTANT

## 2022-09-12 PROCEDURE — 25000003 PHARM REV CODE 250: Performed by: INTERNAL MEDICINE

## 2022-09-12 PROCEDURE — 4010F PR ACE/ARB THEARPY RXD/TAKEN: ICD-10-PCS | Mod: CPTII,,, | Performed by: PHYSICIAN ASSISTANT

## 2022-09-12 PROCEDURE — 3008F BODY MASS INDEX DOCD: CPT | Mod: CPTII,,, | Performed by: PHYSICIAN ASSISTANT

## 2022-09-12 PROCEDURE — 99999 PR PBB SHADOW E&M-EST. PATIENT-LVL II: CPT | Mod: PBBFAC,,, | Performed by: PHYSICIAN ASSISTANT

## 2022-09-12 PROCEDURE — 3078F DIAST BP <80 MM HG: CPT | Mod: CPTII,,, | Performed by: PHYSICIAN ASSISTANT

## 2022-09-12 PROCEDURE — 99215 PR OFFICE/OUTPT VISIT, EST, LEVL V, 40-54 MIN: ICD-10-PCS | Mod: S$PBB,,, | Performed by: PHYSICIAN ASSISTANT

## 2022-09-12 PROCEDURE — 3078F PR MOST RECENT DIASTOLIC BLOOD PRESSURE < 80 MM HG: ICD-10-PCS | Mod: CPTII,,, | Performed by: PHYSICIAN ASSISTANT

## 2022-09-12 PROCEDURE — 4010F ACE/ARB THERAPY RXD/TAKEN: CPT | Mod: CPTII,,, | Performed by: PHYSICIAN ASSISTANT

## 2022-09-12 PROCEDURE — 25000003 PHARM REV CODE 250: Performed by: PHYSICIAN ASSISTANT

## 2022-09-12 PROCEDURE — 96413 CHEMO IV INFUSION 1 HR: CPT

## 2022-09-12 PROCEDURE — 63600175 PHARM REV CODE 636 W HCPCS: Performed by: INTERNAL MEDICINE

## 2022-09-12 RX ORDER — DIPHENHYDRAMINE HYDROCHLORIDE 50 MG/ML
25 INJECTION INTRAMUSCULAR; INTRAVENOUS
Status: COMPLETED | OUTPATIENT
Start: 2022-09-12 | End: 2022-09-12

## 2022-09-12 RX ORDER — DIPHENHYDRAMINE HYDROCHLORIDE 50 MG/ML
25 INJECTION INTRAMUSCULAR; INTRAVENOUS
Status: CANCELLED
Start: 2022-10-03

## 2022-09-12 RX ORDER — ACETAMINOPHEN 325 MG/1
650 TABLET ORAL EVERY 4 HOURS PRN
Status: DISCONTINUED | OUTPATIENT
Start: 2022-09-12 | End: 2022-09-12 | Stop reason: HOSPADM

## 2022-09-12 RX ORDER — METHYLPREDNISOLONE SOD SUCC 125 MG
125 VIAL (EA) INJECTION
Status: COMPLETED | OUTPATIENT
Start: 2022-09-12 | End: 2022-09-12

## 2022-09-12 RX ORDER — ONDANSETRON 2 MG/ML
4 INJECTION INTRAMUSCULAR; INTRAVENOUS EVERY 4 HOURS PRN
Status: DISCONTINUED | OUTPATIENT
Start: 2022-09-12 | End: 2022-09-12 | Stop reason: HOSPADM

## 2022-09-12 RX ORDER — SODIUM CHLORIDE 0.9 % (FLUSH) 0.9 %
10 SYRINGE (ML) INJECTION
Status: DISCONTINUED | OUTPATIENT
Start: 2022-09-12 | End: 2022-09-12 | Stop reason: HOSPADM

## 2022-09-12 RX ORDER — ONDANSETRON 2 MG/ML
4 INJECTION INTRAMUSCULAR; INTRAVENOUS EVERY 4 HOURS PRN
Status: CANCELLED
Start: 2022-10-03

## 2022-09-12 RX ORDER — SODIUM CHLORIDE 0.9 % (FLUSH) 0.9 %
10 SYRINGE (ML) INJECTION
Status: CANCELLED | OUTPATIENT
Start: 2022-10-03

## 2022-09-12 RX ORDER — HEPARIN 100 UNIT/ML
500 SYRINGE INTRAVENOUS
Status: CANCELLED | OUTPATIENT
Start: 2022-10-03

## 2022-09-12 RX ORDER — METHYLPREDNISOLONE SOD SUCC 125 MG
125 VIAL (EA) INJECTION
Status: CANCELLED
Start: 2022-10-03

## 2022-09-12 RX ORDER — ACETAMINOPHEN 325 MG/1
650 TABLET ORAL EVERY 4 HOURS PRN
Status: CANCELLED | OUTPATIENT
Start: 2022-10-03

## 2022-09-12 RX ADMIN — SODIUM CHLORIDE 500 ML: 0.9 INJECTION, SOLUTION INTRAVENOUS at 08:09

## 2022-09-12 RX ADMIN — DIPHENHYDRAMINE HYDROCHLORIDE 25 MG: 50 INJECTION, SOLUTION INTRAMUSCULAR; INTRAVENOUS at 08:09

## 2022-09-12 RX ADMIN — CYCLOPHOSPHAMIDE 1200 MG: 200 INJECTION, SOLUTION INTRAVENOUS at 11:09

## 2022-09-12 RX ADMIN — METHYLPREDNISOLONE SODIUM SUCCINATE 125 MG: 125 INJECTION, POWDER, FOR SOLUTION INTRAMUSCULAR; INTRAVENOUS at 08:09

## 2022-09-12 RX ADMIN — MESNA 600 MG: 100 INJECTION, SOLUTION INTRAVENOUS at 12:09

## 2022-09-12 RX ADMIN — MESNA 600 MG: 100 INJECTION, SOLUTION INTRAVENOUS at 10:09

## 2022-09-12 RX ADMIN — SODIUM CHLORIDE 500 ML: 0.9 INJECTION, SOLUTION INTRAVENOUS at 12:09

## 2022-09-12 RX ADMIN — ACETAMINOPHEN 650 MG: 325 TABLET ORAL at 08:09

## 2022-09-12 RX ADMIN — ONDANSETRON 4 MG: 2 INJECTION INTRAMUSCULAR; INTRAVENOUS at 08:09

## 2022-09-12 NOTE — Clinical Note
Very complex patient with vasculitis and inflammatory arthritis.  Overall doing better since starting Cytoxan.  However she just completed her 6th dose of Cytoxan yesterday.  She is set to repeat urinalysis in 2 weeks.  Sed rate has been trending up.  CRP has gone up significantly since last check as well.  Do you need to see her back in the clinic.  She is taking 20 mg prednisone per day  She does not like the way higher dose make her feel.

## 2022-09-12 NOTE — NURSING
Infusion# 6      Recent labs? Reviewed  Notified Dr LÓPEZ and Charles re: U/A results.  Instructed to proceed with infusion.    Premeds? Solumedrol 125 mg IVP  Benedryl 25 mg IVP  Tylenol 650 mg PO    Pre-Hydration IV fluids 500 cc NS 0.9% over 2 hours  Mesna 600 mg IVPB over 15 minutes.     S/S of current or recent infections in the past 14 days? None/Denies    Recent Surgery/invasive procedures? None/Denies     Any recent vaccines ? None/Denies    Cytoxan 1200 mg administered IV at a 60 minute rate per orders; see MAR and vitals for more  Details.  500 cc NS 0.9 % IV fluids over 2 hours Post Hydration.  Mesna 600 mg IVPB over 15 minutes along with IVF's.

## 2022-09-13 NOTE — PROGRESS NOTES
Subjective:      Patient ID: Denise Flower is a 47 y.o. female.    Chief Complaint: No chief complaint on file.      HPI   Denise Flower  is a 47 y.o. female seein in infusion for f/u on a very complex presentation of vasculitis with microscopic poly angiitis and inflammatory arthritis.  She has failed both Rituxan and high-dose Imuran in the past.  Currently on Cytoxan and prednisone.  She most recently saw Dr. LÓPEZ. he has her on Cytoxan every 4 weeks for 6 cycles.  This is her 6th cycle.  Reportedly doing much better than prior to starting Cytoxan.  she does not like the higher doses of predinisone (> 20 mg) because it makes her feel very bloated and has caused her to gain weight.  She has Lyrica 100 mg b.i.d. ordered.    As far as pain goes, she is feeling ok today.  No significant joint c/o.  Shoulder improved since last check.  She also seems to note difference as with weather changes.   Overall she is feeling better than she did when she 1st started Cytoxan.  She is tolerating it well, but does complain of some nausea.  She has Zofran ordered p.r.n. for infusion.  She does not have any Zofran at home.  No relief with tramadol.  Minimal relief with Lyrica.    Patient denies fevers, chills, photosensitivity, eye pain, shortness of breath, chest pain, hematuria, blood in the stool, rash, ulcerations.  Rheumatologic systems otherwise negative.    Serologies/Labs:  Negative RF, CCP, HLA B27  (+) ED 1:1280 homogenous, negative profile  (+) DRVVT  (+) MPO, ANCA, cardiolipin antibody  Negative proteinase 3  No abnormality w SPEP/SIDDHARTH 12/2021  CK WNL 3/30/21  Aldolase 10.1 - 3/30/21  Current Treatment:  Cytoxan q 4 weeks - inf #3 today  Previous Treatment:   Rituxan      Current Outpatient Medications:     amLODIPine (NORVASC) 10 MG tablet, Take 10 mg by mouth once daily., Disp: , Rfl:     amlodipine-valsartan (EXFORGE)  mg per tablet, Take 1 tablet by mouth once daily., Disp: , Rfl:     aspirin 325 MG  tablet, , Disp: , Rfl:     bumetanide (BUMEX) 2 MG tablet, Take 2 mg by mouth 2 (two) times daily., Disp: , Rfl:     carvediloL (COREG) 25 MG tablet, Take 25 mg by mouth 2 (two) times daily., Disp: , Rfl:     CATAPRES-TTS-3 0.3 mg/24 hr, Place 1 patch onto the skin once a week., Disp: , Rfl:     cholecalciferol, vitamin D3, 1,250 mcg (50,000 unit) capsule, TAKE 1 CAPSULE BY MOUTH ONCE A WEEK FOR VIT. D DEFICIENCY, Disp: , Rfl:     clonazePAM (KLONOPIN) 1 MG tablet, TAKE 1 TABLET BY MOUTH ONCE DAILY AT BEDTIME AS NEEDED FOR ANXIETY SLEEP, Disp: , Rfl:     cloNIDine (CATAPRES) 0.3 MG tablet, , Disp: , Rfl:     escitalopram oxalate (LEXAPRO) 20 MG tablet, TAKE 1 TABLET BY MOUTH ONCE DAILY FOR 30 DAYS, Disp: , Rfl:     FEROSUL 325 mg (65 mg iron) Tab tablet, TAKE 2 TABLETS BY MOUTH DAILY FOR IRON DEFICIENCY, Disp: , Rfl:     furosemide (LASIX) 20 MG tablet, Take 20 mg by mouth 2 (two) times daily., Disp: , Rfl:     HYDROcodone-acetaminophen (NORCO) 5-325 mg per tablet, Take 1 tablet by mouth every 6 (six) hours as needed for Pain., Disp: 28 tablet, Rfl: 0    LIDOcaine (LIDODERM) 5 %, 1 patch once daily., Disp: , Rfl:     mirtazapine (REMERON) 15 MG tablet, Take 15 mg by mouth nightly., Disp: , Rfl:     olmesartan (BENICAR) 20 MG tablet, Take 20 mg by mouth every morning., Disp: , Rfl:     ondansetron (ZOFRAN) 4 MG tablet, Take 1 tablet (4 mg total) by mouth every 8 (eight) hours as needed for Nausea., Disp: 30 tablet, Rfl: 1    pantoprazole (PROTONIX) 20 MG tablet, Take 20 mg by mouth every morning., Disp: , Rfl:     potassium chloride SA (K-DUR,KLOR-CON) 20 MEQ tablet, Take 1 tablet (20 mEq total) by mouth 2 (two) times daily., Disp: 60 tablet, Rfl: 6    predniSONE (DELTASONE) 20 MG tablet, Take 1 tablet (20 mg total) by mouth once daily., Disp: 60 tablet, Rfl: 0    sertraline (ZOLOFT) 50 MG tablet, Take 50 mg by mouth once daily., Disp: , Rfl:     spironolactone (ALDACTONE) 25 MG tablet, Take 25 mg by mouth once  "daily., Disp: , Rfl:     temazepam (RESTORIL) 15 mg Cap, TAKE 1 CAPSULE BY MOUTH ONCE DAILY AT BEDTIME AS NEEDED, Disp: , Rfl:     tiZANidine (ZANAFLEX) 4 MG tablet, TAKE 1 TABLET BY MOUTH AS NEEDED THREE TIMES A DAY IF NEEDED FOR PAIN, Disp: , Rfl:     valsartan (DIOVAN) 320 MG tablet, Take 320 mg by mouth once daily., Disp: , Rfl:     Past Medical History:   Diagnosis Date    Hypertension     Stroke     2010     Family History   Problem Relation Age of Onset    Hypertension Mother     Cancer Father         esophegal     Social History     Socioeconomic History    Marital status: Single   Tobacco Use    Smoking status: Never    Smokeless tobacco: Never   Substance and Sexual Activity    Alcohol use: Not Currently     Review of patient's allergies indicates:   Allergen Reactions    Oranges [orange] Hives       Objective:   /70   Pulse 65   Ht 5' 2" (1.575 m)   Wt 95.8 kg (211 lb 3.2 oz)   BMI 38.63 kg/m²   Immunization History   Administered Date(s) Administered    COVID-19, MRNA, LN-S, PF (MODERNA FULL 0.5 ML DOSE) 06/23/2021    DTaP 1973, 1973, 1973, 09/05/1974, 08/10/1978    IPV 1973, 1973, 1973, 09/05/1974, 08/10/1978    Influenza - Quadrivalent - PF *Preferred* (6 months and older) 12/01/2020    MMR 01/14/2004    Measles / Rubella 05/23/1974    Rubella 08/10/1978    Td (ADULT) 06/04/1987, 06/11/1998, 04/09/2009       Physical Exam   Constitutional: She is oriented to person, place, and time. No distress.   HENT:   Head: Normocephalic and atraumatic.   Pulmonary/Chest: Effort normal.   Abdominal: She exhibits no distension.   Musculoskeletal:         General: No swelling or tenderness. Normal range of motion.      Cervical back: Normal range of motion.   Lymphadenopathy:     She has no cervical adenopathy.   Neurological: She is alert and oriented to person, place, and time.   Skin: Skin is warm and dry. No rash noted.   Psychiatric: Mood normal.   Nursing note and " vitals reviewed.Mild synovitis multiple PIP jt    Mild swelling hands, but no sign synovitis  No sign ttp over large/small joints    Recent Results (from the past 672 hour(s))   Protein/Creatinine Ratio, Urine    Collection Time: 09/12/22  7:46 AM   Result Value Ref Range    Protein, Urine Random 117 (H) 0 - 15 mg/dL    Creatinine, Urine 244.0 15.0 - 325.0 mg/dL    Prot/Creat Ratio, Urine 0.48 (H) 0.00 - 0.20   Urinalysis    Collection Time: 09/12/22  7:46 AM   Result Value Ref Range    Specimen UA Urine, Clean Catch     Color, UA Yellow Yellow, Straw, Carolann    Appearance, UA Hazy (A) Clear    pH, UA 6.0 5.0 - 8.0    Specific Gravity, UA >=1.030 (A) 1.005 - 1.030    Protein, UA 2+ (A) Negative    Glucose, UA Negative Negative    Ketones, UA Negative Negative    Bilirubin (UA) Negative Negative    Occult Blood UA Trace (A) Negative    Nitrite, UA Negative Negative    Leukocytes, UA Negative Negative   Urinalysis Microscopic    Collection Time: 09/12/22  7:46 AM   Result Value Ref Range    RBC, UA 5 (H) 0 - 4 /hpf    WBC, UA 10 (H) 0 - 5 /hpf    Bacteria Moderate (A) None-Occ /hpf    Squam Epithel, UA 5 /hpf    Hyaline Casts, UA 0 0-1/lpf /lpf    Microscopic Comment SEE COMMENT    CBC Auto Differential    Collection Time: 09/12/22  7:57 AM   Result Value Ref Range    WBC 4.09 3.90 - 12.70 K/uL    RBC 4.14 4.00 - 5.40 M/uL    Hemoglobin 11.5 (L) 12.0 - 16.0 g/dL    Hematocrit 35.9 (L) 37.0 - 48.5 %    MCV 87 82 - 98 fL    MCH 27.8 27.0 - 31.0 pg    MCHC 32.0 32.0 - 36.0 g/dL    RDW 15.0 (H) 11.5 - 14.5 %    Platelets 325 150 - 450 K/uL    MPV 11.4 9.2 - 12.9 fL    Immature Granulocytes 0.2 0.0 - 0.5 %    Gran # (ANC) 1.7 (L) 1.8 - 7.7 K/uL    Immature Grans (Abs) 0.01 0.00 - 0.04 K/uL    Lymph # 1.6 1.0 - 4.8 K/uL    Mono # 0.7 0.3 - 1.0 K/uL    Eos # 0.1 0.0 - 0.5 K/uL    Baso # 0.03 0.00 - 0.20 K/uL    nRBC 0 0 /100 WBC    Gran % 40.9 38.0 - 73.0 %    Lymph % 37.9 18.0 - 48.0 %    Mono % 17.4 (H) 4.0 - 15.0 %     Eosinophil % 2.9 0.0 - 8.0 %    Basophil % 0.7 0.0 - 1.9 %    Differential Method Automated    Comprehensive Metabolic Panel    Collection Time: 09/12/22  7:57 AM   Result Value Ref Range    Sodium 143 136 - 145 mmol/L    Potassium 4.0 3.5 - 5.1 mmol/L    Chloride 107 95 - 110 mmol/L    CO2 25 23 - 29 mmol/L    Glucose 104 70 - 110 mg/dL    BUN 12 6 - 20 mg/dL    Creatinine 1.0 0.5 - 1.4 mg/dL    Calcium 9.2 8.7 - 10.5 mg/dL    Total Protein 6.8 6.0 - 8.4 g/dL    Albumin 3.6 3.5 - 5.2 g/dL    Total Bilirubin 0.4 0.1 - 1.0 mg/dL    Alkaline Phosphatase 76 55 - 135 U/L    AST 13 10 - 40 U/L    ALT 16 10 - 44 U/L    Anion Gap 11 8 - 16 mmol/L    eGFR >60 >60 mL/min/1.73 m^2   C-Reactive Protein    Collection Time: 09/12/22  7:57 AM   Result Value Ref Range    CRP 21.9 (H) 0.0 - 8.2 mg/L   Sedimentation rate    Collection Time: 09/12/22  7:57 AM   Result Value Ref Range    Sed Rate 77 (H) 0 - 36 mm/Hr       Lab Results   Component Value Date    TBGOLDPLUS Negative 04/27/2022      Lab Results   Component Value Date    HEPBCAB Negative 10/12/2020    HEPCAB Negative 10/12/2020        Assessment:     1. Bacteria in urine    2. Microscopic polyangiitis    3. Immunocompromised    4. Long term current use of immunosuppressive drug    5. Rheumatoid arthritis of multiple sites with negative rheumatoid factor    6. Inflammatory polyarthritis            Plan:     Diagnoses and all orders for this visit:    Bacteria in urine  -     Urinalysis, Reflex to Urine Culture Urine, Clean Catch; Standing    Microscopic polyangiitis    Immunocompromised    Long term current use of immunosuppressive drug    Rheumatoid arthritis of multiple sites with negative rheumatoid factor    Inflammatory polyarthritis      Complex case of Microscopic polyangiitis  Continue current prednisone 20 mg daily  zofran prn  Labs reveiwed.    ESR and CRP both trending up  Spoke w Dr. LÓPEZ.  He would like to see her in 4 weeks.  Multiple components of autoimmunity  including abnormal APL antibodies, elevated aldolase, positive ED, abnormal LAC, significantly elevated MPO and ANCA antibodies  Drug therapy requiring intensive monitoring for toxicity  High Risk Medication Monitoring encounter  No current medication related issues, no evidence of toxicity  I ordered labs for toxicity monitoring, have personally reviewed the findings, and discussed them with the patient.  Pending labs will be sent via the portal  Compromised immune system secondary to autoimmune disease and/or use of immunosuppressive drugs.  Monitor carefully for infections.  Advised patient to get immediate medical care if any infection arises.  Also advised strict adherence age-appropriate vaccinations and cancer screenings with PCP.  Patient advised to hold DMARD and/or biologic therapy for signs of infection or for surgery. If you are unsure what to do please call our office for instruction.[facility] [phone]  Return to clinic: 4 weeks w Dr. LÓPEZ    Follow up in about 4 weeks (around 10/10/2022) for f/u w Dr. LÓPEZ.    The patient understands, chooses and consents to this plan and accepts all   the risks which include but are not limited to the risks mentioned above.     Disclaimer: This note was prepared using a voice recognition system and is likely to have sound alike errors within the text.

## 2022-09-14 ENCOUNTER — PATIENT MESSAGE (OUTPATIENT)
Dept: RHEUMATOLOGY | Facility: CLINIC | Age: 47
End: 2022-09-14
Payer: MEDICARE

## 2022-09-26 ENCOUNTER — LAB VISIT (OUTPATIENT)
Dept: LAB | Facility: HOSPITAL | Age: 47
End: 2022-09-26
Attending: FAMILY MEDICINE
Payer: MEDICAID

## 2022-09-26 DIAGNOSIS — R82.71 BACTERIA IN URINE: ICD-10-CM

## 2022-09-26 LAB
BACTERIA #/AREA URNS HPF: NORMAL /HPF
BILIRUB UR QL STRIP: NEGATIVE
CLARITY UR: CLEAR
COLOR UR: YELLOW
GLUCOSE UR QL STRIP: NEGATIVE
HGB UR QL STRIP: NEGATIVE
HYALINE CASTS #/AREA URNS LPF: 0 /LPF
KETONES UR QL STRIP: NEGATIVE
LEUKOCYTE ESTERASE UR QL STRIP: NEGATIVE
MICROSCOPIC COMMENT: NORMAL
NITRITE UR QL STRIP: NEGATIVE
PH UR STRIP: 7 [PH] (ref 5–8)
PROT UR QL STRIP: ABNORMAL
RBC #/AREA URNS HPF: 0 /HPF (ref 0–4)
SP GR UR STRIP: 1.01 (ref 1–1.03)
SQUAMOUS #/AREA URNS HPF: 1 /HPF
URN SPEC COLLECT METH UR: ABNORMAL
WBC #/AREA URNS HPF: 1 /HPF (ref 0–5)

## 2022-09-26 PROCEDURE — 81000 URINALYSIS NONAUTO W/SCOPE: CPT | Performed by: PHYSICIAN ASSISTANT

## 2022-10-12 ENCOUNTER — LAB VISIT (OUTPATIENT)
Dept: LAB | Facility: HOSPITAL | Age: 47
End: 2022-10-12
Attending: INTERNAL MEDICINE
Payer: MEDICARE

## 2022-10-12 ENCOUNTER — OFFICE VISIT (OUTPATIENT)
Dept: RHEUMATOLOGY | Facility: CLINIC | Age: 47
End: 2022-10-12
Payer: MEDICARE

## 2022-10-12 VITALS
DIASTOLIC BLOOD PRESSURE: 99 MMHG | BODY MASS INDEX: 39.19 KG/M2 | HEART RATE: 66 BPM | WEIGHT: 212.94 LBS | SYSTOLIC BLOOD PRESSURE: 142 MMHG | HEIGHT: 62 IN

## 2022-10-12 DIAGNOSIS — D84.9 IMMUNOCOMPROMISED: ICD-10-CM

## 2022-10-12 DIAGNOSIS — M31.7 MICROSCOPIC POLYANGIITIS: Primary | ICD-10-CM

## 2022-10-12 DIAGNOSIS — M31.7 MICROSCOPIC POLYANGIITIS: ICD-10-CM

## 2022-10-12 DIAGNOSIS — Z79.899 LONG TERM CURRENT USE OF IMMUNOSUPPRESSIVE DRUG: ICD-10-CM

## 2022-10-12 DIAGNOSIS — G89.4 CHRONIC PAIN SYNDROME: ICD-10-CM

## 2022-10-12 DIAGNOSIS — M06.09 RHEUMATOID ARTHRITIS OF MULTIPLE SITES WITH NEGATIVE RHEUMATOID FACTOR: ICD-10-CM

## 2022-10-12 LAB
ALBUMIN SERPL BCP-MCNC: 3.7 G/DL (ref 3.5–5.2)
ALP SERPL-CCNC: 89 U/L (ref 55–135)
ALT SERPL W/O P-5'-P-CCNC: 18 U/L (ref 10–44)
ANION GAP SERPL CALC-SCNC: 10 MMOL/L (ref 8–16)
AST SERPL-CCNC: 13 U/L (ref 10–40)
BASOPHILS # BLD AUTO: 0.03 K/UL (ref 0–0.2)
BASOPHILS NFR BLD: 0.4 % (ref 0–1.9)
BILIRUB SERPL-MCNC: 0.3 MG/DL (ref 0.1–1)
BUN SERPL-MCNC: 15 MG/DL (ref 6–20)
CALCIUM SERPL-MCNC: 9.2 MG/DL (ref 8.7–10.5)
CHLORIDE SERPL-SCNC: 107 MMOL/L (ref 95–110)
CO2 SERPL-SCNC: 26 MMOL/L (ref 23–29)
CREAT SERPL-MCNC: 1 MG/DL (ref 0.5–1.4)
CRP SERPL-MCNC: 9 MG/L (ref 0–8.2)
DIFFERENTIAL METHOD: ABNORMAL
EOSINOPHIL # BLD AUTO: 0.1 K/UL (ref 0–0.5)
EOSINOPHIL NFR BLD: 1.3 % (ref 0–8)
ERYTHROCYTE [DISTWIDTH] IN BLOOD BY AUTOMATED COUNT: 14.7 % (ref 11.5–14.5)
ERYTHROCYTE [SEDIMENTATION RATE] IN BLOOD BY PHOTOMETRIC METHOD: 67 MM/HR (ref 0–36)
EST. GFR  (NO RACE VARIABLE): >60 ML/MIN/1.73 M^2
GLUCOSE SERPL-MCNC: 103 MG/DL (ref 70–110)
HCT VFR BLD AUTO: 38.1 % (ref 37–48.5)
HGB BLD-MCNC: 12.2 G/DL (ref 12–16)
IMM GRANULOCYTES # BLD AUTO: 0.03 K/UL (ref 0–0.04)
IMM GRANULOCYTES NFR BLD AUTO: 0.4 % (ref 0–0.5)
LYMPHOCYTES # BLD AUTO: 1.4 K/UL (ref 1–4.8)
LYMPHOCYTES NFR BLD: 20.4 % (ref 18–48)
MCH RBC QN AUTO: 27.8 PG (ref 27–31)
MCHC RBC AUTO-ENTMCNC: 32 G/DL (ref 32–36)
MCV RBC AUTO: 87 FL (ref 82–98)
MONOCYTES # BLD AUTO: 0.4 K/UL (ref 0.3–1)
MONOCYTES NFR BLD: 5.6 % (ref 4–15)
NEUTROPHILS # BLD AUTO: 4.9 K/UL (ref 1.8–7.7)
NEUTROPHILS NFR BLD: 71.9 % (ref 38–73)
NRBC BLD-RTO: 0 /100 WBC
PLATELET # BLD AUTO: 309 K/UL (ref 150–450)
PMV BLD AUTO: 10.9 FL (ref 9.2–12.9)
POTASSIUM SERPL-SCNC: 3.6 MMOL/L (ref 3.5–5.1)
PROT SERPL-MCNC: 7.3 G/DL (ref 6–8.4)
RBC # BLD AUTO: 4.39 M/UL (ref 4–5.4)
RHEUMATOID FACT SERPL-ACNC: <13 IU/ML (ref 0–15)
SODIUM SERPL-SCNC: 143 MMOL/L (ref 136–145)
WBC # BLD AUTO: 6.83 K/UL (ref 3.9–12.7)

## 2022-10-12 PROCEDURE — 83520 IMMUNOASSAY QUANT NOS NONAB: CPT | Performed by: INTERNAL MEDICINE

## 2022-10-12 PROCEDURE — 80053 COMPREHEN METABOLIC PANEL: CPT | Performed by: INTERNAL MEDICINE

## 2022-10-12 PROCEDURE — 36415 COLL VENOUS BLD VENIPUNCTURE: CPT | Performed by: INTERNAL MEDICINE

## 2022-10-12 PROCEDURE — 86431 RHEUMATOID FACTOR QUANT: CPT | Performed by: INTERNAL MEDICINE

## 2022-10-12 PROCEDURE — 86039 ANTINUCLEAR ANTIBODIES (ANA): CPT | Performed by: INTERNAL MEDICINE

## 2022-10-12 PROCEDURE — 86255 FLUORESCENT ANTIBODY SCREEN: CPT | Mod: 59 | Performed by: INTERNAL MEDICINE

## 2022-10-12 PROCEDURE — 86038 ANTINUCLEAR ANTIBODIES: CPT | Performed by: INTERNAL MEDICINE

## 2022-10-12 PROCEDURE — 83516 IMMUNOASSAY NONANTIBODY: CPT | Mod: 59 | Performed by: INTERNAL MEDICINE

## 2022-10-12 PROCEDURE — 99999 PR PBB SHADOW E&M-EST. PATIENT-LVL III: CPT | Mod: PBBFAC,,, | Performed by: INTERNAL MEDICINE

## 2022-10-12 PROCEDURE — 99215 PR OFFICE/OUTPT VISIT, EST, LEVL V, 40-54 MIN: ICD-10-PCS | Mod: S$GLB,,, | Performed by: INTERNAL MEDICINE

## 2022-10-12 PROCEDURE — 85025 COMPLETE CBC W/AUTO DIFF WBC: CPT | Performed by: INTERNAL MEDICINE

## 2022-10-12 PROCEDURE — 85652 RBC SED RATE AUTOMATED: CPT | Performed by: INTERNAL MEDICINE

## 2022-10-12 PROCEDURE — 99999 PR PBB SHADOW E&M-EST. PATIENT-LVL III: ICD-10-PCS | Mod: PBBFAC,,, | Performed by: INTERNAL MEDICINE

## 2022-10-12 PROCEDURE — 83516 IMMUNOASSAY NONANTIBODY: CPT | Performed by: INTERNAL MEDICINE

## 2022-10-12 PROCEDURE — 86200 CCP ANTIBODY: CPT | Performed by: INTERNAL MEDICINE

## 2022-10-12 PROCEDURE — 99215 OFFICE O/P EST HI 40 MIN: CPT | Mod: S$GLB,,, | Performed by: INTERNAL MEDICINE

## 2022-10-12 PROCEDURE — 86140 C-REACTIVE PROTEIN: CPT | Performed by: INTERNAL MEDICINE

## 2022-10-12 PROCEDURE — 86235 NUCLEAR ANTIGEN ANTIBODY: CPT | Mod: 59 | Performed by: INTERNAL MEDICINE

## 2022-10-12 PROCEDURE — 99213 OFFICE O/P EST LOW 20 MIN: CPT | Mod: PBBFAC | Performed by: INTERNAL MEDICINE

## 2022-10-12 RX ORDER — NEBIVOLOL 10 MG/1
10 TABLET ORAL DAILY
COMMUNITY
End: 2023-03-30

## 2022-10-12 RX ORDER — HEPARIN 100 UNIT/ML
500 SYRINGE INTRAVENOUS
Status: CANCELLED | OUTPATIENT
Start: 2022-10-12

## 2022-10-12 RX ORDER — SODIUM CHLORIDE 0.9 % (FLUSH) 0.9 %
10 SYRINGE (ML) INJECTION
Status: CANCELLED | OUTPATIENT
Start: 2022-10-12

## 2022-10-12 RX ORDER — DULOXETIN HYDROCHLORIDE 20 MG/1
20 CAPSULE, DELAYED RELEASE ORAL DAILY
COMMUNITY

## 2022-10-12 RX ORDER — MYCOPHENOLATE MOFETIL 500 MG/1
500 TABLET ORAL 2 TIMES DAILY
Qty: 60 TABLET | Refills: 0 | Status: SHIPPED | OUTPATIENT
Start: 2022-10-12 | End: 2022-11-10 | Stop reason: SDUPTHER

## 2022-10-12 NOTE — PROGRESS NOTES
RHEUMATOLOGY CLINIC FOLLOW UP VISIT  Chief complaints, HPI, ROS, EXAM, Assessment & Plans:-  Denise Sapp a 47 y.o. pleasant female comes in for follow-up visit.  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  She completed Cytoxan induction therapy for 6 months.  Mild to moderate improvement of her arthritis.  Still has persistent joint pain, swelling and stiffness affecting bilateral hands and feet.  Diffuse pain all over the body.  Failed Rituxan and Imuran.  Not on prednisone at this time because of adverse effects-weight gain.  No neuropathy, abdominal ischemia, skin rash, ulcers.   Rheumatological review of system negative otherwise.  Physical examination shows mild synovitis over f multiple MCP and PIP joints.  Tenderness all over.    1. Microscopic polyangiitis    2. Immunocompromised    3. Long term current use of immunosuppressive drug    4. Rheumatoid arthritis of multiple sites with negative rheumatoid factor    5. Chronic pain syndrome      Problem List Items Addressed This Visit       Rheumatoid arthritis of multiple sites with negative rheumatoid factor    Relevant Medications    mycophenolate (CELLCEPT) 500 mg Tab    Other Relevant Orders    Anti-Neutrophilic Cytoplasmic Antibody    Proteinase 3 Autoantibodies    Myeloperoxidase Antibody (MPO)    ED Screen w/Reflex    CBC Auto Differential    Comprehensive Metabolic Panel    Sedimentation rate    C-Reactive Protein    Rheumatoid Factor    Cyclic Citrullinated Peptide Antibody, IgG    INTERLEUKIN-6    Microscopic polyangiitis - Primary    Relevant Medications    mycophenolate (CELLCEPT) 500 mg Tab    Other Relevant Orders    Anti-Neutrophilic Cytoplasmic Antibody    Proteinase 3 Autoantibodies    Myeloperoxidase Antibody (MPO)    ED Screen w/Reflex    CBC Auto Differential    Comprehensive  Metabolic Panel    Sedimentation rate    C-Reactive Protein    Rheumatoid Factor    Cyclic Citrullinated Peptide Antibody, IgG    INTERLEUKIN-6    Long term current use of immunosuppressive drug    Relevant Orders    Anti-Neutrophilic Cytoplasmic Antibody    Proteinase 3 Autoantibodies    Myeloperoxidase Antibody (MPO)    ED Screen w/Reflex    CBC Auto Differential    Comprehensive Metabolic Panel    Sedimentation rate    C-Reactive Protein    Rheumatoid Factor    Cyclic Citrullinated Peptide Antibody, IgG    INTERLEUKIN-6    Immunocompromised    Relevant Orders    Anti-Neutrophilic Cytoplasmic Antibody    Proteinase 3 Autoantibodies    Myeloperoxidase Antibody (MPO)    ED Screen w/Reflex    CBC Auto Differential    Comprehensive Metabolic Panel    Sedimentation rate    C-Reactive Protein    Rheumatoid Factor    Cyclic Citrullinated Peptide Antibody, IgG    INTERLEUKIN-6    Chronic pain syndrome    Relevant Orders    Anti-Neutrophilic Cytoplasmic Antibody    Proteinase 3 Autoantibodies    Myeloperoxidase Antibody (MPO)    ED Screen w/Reflex    CBC Auto Differential    Comprehensive Metabolic Panel    Sedimentation rate    C-Reactive Protein    Rheumatoid Factor    Cyclic Citrullinated Peptide Antibody, IgG    INTERLEUKIN-6       Severely progressive active microscopic polyangiitis completed 6 doses of Cytoxan.  Check inflammatory markers today.  Start CellCept.  Consider Actemra if elevated IL 6 levels along with elevated inflammatory markers for seronegative rheumatoid arthritis.   Avoid TRINI inhibitors because of history of stroke and positive lupus anticoagulant.  Continue high-dose aspirin per hematologist   Compromised immune system secondary to autoimmune disease and use of immunosuppressive drugs. Monitor carefully for infections. Advised to get immediate medical care if any infection. Also advised strict adherence to age appropriate vaccinations and cancer screenings with PCP.   # Follow up in about 4  weeks (around 11/9/2022).    Medication List with Changes/Refills   New Medications    MYCOPHENOLATE (CELLCEPT) 500 MG TAB    Take 1 tablet (500 mg total) by mouth 2 (two) times daily.   Current Medications    AMLODIPINE-VALSARTAN (EXFORGE)  MG PER TABLET    Take 1 tablet by mouth once daily.    ASPIRIN 325 MG TABLET        CHOLECALCIFEROL, VITAMIN D3, 1,250 MCG (50,000 UNIT) CAPSULE    TAKE 1 CAPSULE BY MOUTH ONCE A WEEK FOR VIT. D DEFICIENCY    CLONAZEPAM (KLONOPIN) 1 MG TABLET    TAKE 1 TABLET BY MOUTH ONCE DAILY AT BEDTIME AS NEEDED FOR ANXIETY SLEEP    DULOXETINE (CYMBALTA) 20 MG CAPSULE    Take 20 mg by mouth once daily.    FEROSUL 325 MG (65 MG IRON) TAB TABLET    TAKE 2 TABLETS BY MOUTH DAILY FOR IRON DEFICIENCY    LIDOCAINE (LIDODERM) 5 %    1 patch once daily.    NEBIVOLOL (BYSTOLIC) 10 MG TAB    Take 10 mg by mouth once daily.    ONDANSETRON (ZOFRAN) 4 MG TABLET    Take 1 tablet (4 mg total) by mouth every 8 (eight) hours as needed for Nausea.    POTASSIUM CHLORIDE SA (K-DUR,KLOR-CON) 20 MEQ TABLET    Take 1 tablet (20 mEq total) by mouth 2 (two) times daily.    PREDNISONE (DELTASONE) 20 MG TABLET    Take 1 tablet (20 mg total) by mouth once daily.    SERTRALINE (ZOLOFT) 50 MG TABLET    Take 100 mg by mouth once daily.    TEMAZEPAM (RESTORIL) 15 MG CAP    30 mg.    TIZANIDINE (ZANAFLEX) 4 MG TABLET    TAKE 1 TABLET BY MOUTH AS NEEDED THREE TIMES A DAY IF NEEDED FOR PAIN   Discontinued Medications    AMLODIPINE (NORVASC) 10 MG TABLET    Take 10 mg by mouth once daily.    BUMETANIDE (BUMEX) 2 MG TABLET    Take 2 mg by mouth 2 (two) times daily.    CARVEDILOL (COREG) 25 MG TABLET    Take 25 mg by mouth 2 (two) times daily.    CATAPRES-TTS-3 0.3 MG/24 HR    Place 1 patch onto the skin once a week.    CLONIDINE (CATAPRES) 0.3 MG TABLET        ESCITALOPRAM OXALATE (LEXAPRO) 20 MG TABLET    TAKE 1 TABLET BY MOUTH ONCE DAILY FOR 30 DAYS    FUROSEMIDE (LASIX) 20 MG TABLET    Take 20 mg by mouth 2 (two) times  daily.    HYDROCODONE-ACETAMINOPHEN (NORCO) 5-325 MG PER TABLET    Take 1 tablet by mouth every 6 (six) hours as needed for Pain.    MIRTAZAPINE (REMERON) 15 MG TABLET    Take 15 mg by mouth nightly.    OLMESARTAN (BENICAR) 20 MG TABLET    Take 20 mg by mouth every morning.    PANTOPRAZOLE (PROTONIX) 20 MG TABLET    Take 20 mg by mouth every morning.    SPIRONOLACTONE (ALDACTONE) 25 MG TABLET    Take 25 mg by mouth once daily.    VALSARTAN (DIOVAN) 320 MG TABLET    Take 320 mg by mouth once daily.       Past Medical History:   Diagnosis Date    Hypertension     Stroke            Past Surgical History:   Procedure Laterality Date     SECTION      TUBAL LIGATION          Social History     Tobacco Use    Smoking status: Never    Smokeless tobacco: Never   Substance Use Topics    Alcohol use: Not Currently       Family History   Problem Relation Age of Onset    Hypertension Mother     Cancer Father         esophegal       Review of patient's allergies indicates:   Allergen Reactions    Oranges [orange] Hives       Disclaimer: This note was prepared using voice recognition system and is likely to have sound alike errors and is not proof read.  Please call me with any questions.

## 2022-10-13 ENCOUNTER — SPECIALTY PHARMACY (OUTPATIENT)
Dept: PHARMACY | Facility: CLINIC | Age: 47
End: 2022-10-13
Payer: MEDICARE

## 2022-10-13 LAB
ANA PATTERN 1: NORMAL
ANA SER QL IF: POSITIVE
ANA TITR SER IF: NORMAL {TITER}
CCP AB SER IA-ACNC: <0.5 U/ML

## 2022-10-13 NOTE — TELEPHONE ENCOUNTER
Hortencia, this is Juanita Ch with [facility].  We are working on your prescription that your doctor has sent us. We will be working with your insurance to get this approved for you. We will be calling you along the way with updates on your medication.  If you have any questions, you can reach us at [phone].  Welcome call outcome: Patient/caregiver reached. Patient believes she has to get it filled through [facility].     Mycophenolate (Cellcept) rx received   -PA is not required. Message sent to MD re: mycophenolate mofetil dose. Ivent opened.      Benefits Investigation   Insurance name: [employer] Medicare Part D   Copay: $1.35  LIS LVL: 2     Will call to confirm whether or not patient is locked in to [facility].

## 2022-10-13 NOTE — TELEPHONE ENCOUNTER
Outgoing call to [facility] help desk to check for pharmacy lock-in.   Rep: Penny RICO   Patient does not appear to be locked in to [facility] mail-order.   Ref# [id number]      Outgoing call to patient to inform. She will fill with [facility].     Forwarding to initial.

## 2022-10-14 ENCOUNTER — SPECIALTY PHARMACY (OUTPATIENT)
Dept: PHARMACY | Facility: CLINIC | Age: 47
End: 2022-10-14
Payer: MEDICARE

## 2022-10-14 ENCOUNTER — TELEPHONE (OUTPATIENT)
Dept: RHEUMATOLOGY | Facility: CLINIC | Age: 47
End: 2022-10-14
Payer: MEDICARE

## 2022-10-14 LAB
ANTI SM ANTIBODY: 0.09 RATIO (ref 0–0.99)
ANTI SM/RNP ANTIBODY: 0.11 RATIO (ref 0–0.99)
ANTI-SM INTERPRETATION: NEGATIVE
ANTI-SM/RNP INTERPRETATION: NEGATIVE
ANTI-SSA ANTIBODY: 0.07 RATIO (ref 0–0.99)
ANTI-SSA INTERPRETATION: NEGATIVE
ANTI-SSB ANTIBODY: 0.06 RATIO (ref 0–0.99)
ANTI-SSB INTERPRETATION: NEGATIVE
DSDNA AB SER-ACNC: NORMAL [IU]/ML
PROTEINASE3 IGG SER-ACNC: <0.2 U

## 2022-10-14 NOTE — TELEPHONE ENCOUNTER
Requested Supporting information given to [facility] for Actemra referral as per Dr LÓPEZ's note.

## 2022-10-14 NOTE — TELEPHONE ENCOUNTER
Specialty Pharmacy - Initial Clinical Assessment    Specialty Medication Orders Linked to Encounter      Flowsheet Row Most Recent Value   Medication #1 mycophenolate (CELLCEPT) 500 mg Tab (Order#[id number], Rx#[id number])          Patient Diagnosis   M06.09 - Rheumatoid arthritis of multiple sites with negative rheumatoid factor    Subjective    Denise Flower is a 47 y.o. female, who is followed by the specialty pharmacy service for management and education.    Recent Encounters       Date Type Provider Description    10/14/2022 Specialty Pharmacy Jose Alexandra, Jean-Paul Initial Clinical Assessment    10/13/2022 Specialty Pharmacy Juanita Ch, PharmD Referral Authorization    04/22/2021 Specialty Pharmacy Opal Meredith, WinstonD Referral Authorization          Clinical call attempts since last clinical assessment   No call attempts found.     Current Outpatient Medications   Medication Sig    amlodipine-valsartan (EXFORGE)  mg per tablet Take 1 tablet by mouth once daily.    aspirin 325 MG tablet     cholecalciferol, vitamin D3, 1,250 mcg (50,000 unit) capsule TAKE 1 CAPSULE BY MOUTH ONCE A WEEK FOR VIT. D DEFICIENCY    clonazePAM (KLONOPIN) 1 MG tablet TAKE 1 TABLET BY MOUTH ONCE DAILY AT BEDTIME AS NEEDED FOR ANXIETY SLEEP    DULoxetine (CYMBALTA) 20 MG capsule Take 20 mg by mouth once daily.    FEROSUL 325 mg (65 mg iron) Tab tablet TAKE 2 TABLETS BY MOUTH DAILY FOR IRON DEFICIENCY    LIDOcaine (LIDODERM) 5 % 1 patch once daily.    mycophenolate (CELLCEPT) 500 mg Tab Take 1 tablet (500 mg total) by mouth 2 (two) times daily.    nebivoloL (BYSTOLIC) 10 MG Tab Take 10 mg by mouth once daily.    ondansetron (ZOFRAN) 4 MG tablet Take 1 tablet (4 mg total) by mouth every 8 (eight) hours as needed for Nausea.    potassium chloride SA (K-DUR,KLOR-CON) 20 MEQ tablet Take 1 tablet (20 mEq total) by mouth 2 (two) times daily.    predniSONE (DELTASONE) 20 MG tablet Take 1 tablet (20 mg total) by mouth once daily.     sertraline (ZOLOFT) 50 MG tablet Take 100 mg by mouth once daily.    temazepam (RESTORIL) 15 mg Cap 30 mg.    tiZANidine (ZANAFLEX) 4 MG tablet TAKE 1 TABLET BY MOUTH AS NEEDED THREE TIMES A DAY IF NEEDED FOR PAIN   Last reviewed on 10/14/2022  3:07 PM by Jose Alexandra PharmD    Review of patient's allergies indicates:   Allergen Reactions    Oranges [orange] Hives   Last reviewed on  10/14/2022 2:55 PM by Jose Alexandra    Drug Interactions    Drug interactions evaluated: yes  Clinically relevant drug interactions identified: no  Provided the patient with educational material regarding drug interactions: not applicable         Adverse Effects    Arthralgias: Pos  Gait problem: Pos  Joint swelling: Pos  Myalgias: Pos       Assessment Questions - Documented Responses      Flowsheet Row Most Recent Value   Assessment    Medication Reconciliation completed for patient Yes   During the past 4 weeks, has patient missed any activities due to condition or medication? Missed Work   During the past 4 weeks, did patient have any of the following urgent care visits? None   Goals of Therapy Status Discussed (new start)   Status of the patients ability to self-administer: Is Able   All education points have been covered with patient? Yes, supplemental printed education provided   Welcome packet contents reviewed and discussed with patient? Yes   Assesment completed? Yes   Plan Therapy being initiated   Do you need to open a clinical intervention (i-vent)? No   Do you want to schedule first shipment? Yes          Refill Questions - Documented Responses      Flowsheet Row Most Recent Value   Patient Availability and HIPAA Verification    Does patient want to proceed with activity? Yes   HIPAA/medical authority confirmed? Yes   Relationship to patient of person spoken to? Self   Refill Screening Questions    When does the patient need to receive the medication? 10/18/22   Refill Delivery Questions    How will the patient receive  "the medication? MEDRx   When does the patient need to receive the medication? 10/18/22   Shipping Address Home   Address in [facility] confirmed and updated if neccessary? Yes   Expected Copay ($) 1.35   Is the patient able to afford the medication copay? Yes   Payment Method CC on file   Days supply of Refill 30   Supplies needed? No supplies needed   Refill activity completed? Yes   Refill activity plan Refill scheduled   Shipment/Pickup Date: 10/17/22            Objective    She has a past medical history of Hypertension and Stroke.    Tried/failed medications: Rituxan and Imuran     BP Readings from Last 4 Encounters:   10/12/22 (!) 142/99   09/12/22 113/70   09/12/22 139/88   08/15/22 (!) 141/87     Ht Readings from Last 4 Encounters:   10/12/22 5' 2" (1.575 m)   09/12/22 5' 2" (1.575 m)   09/12/22 5' 2" (1.575 m)   08/15/22 5' 2" (1.575 m)     Wt Readings from Last 4 Encounters:   10/12/22 96.6 kg (212 lb 15.4 oz)   09/12/22 95.8 kg (211 lb 3.2 oz)   09/12/22 95.8 kg (211 lb 3.2 oz)   08/15/22 98.3 kg (216 lb 11.4 oz)     Recent Labs   Lab Result Units 10/12/22  1338 09/12/22  0757 08/15/22  0825   Creatinine mg/dL 1.0 1.0 0.8   ALT U/L 18 16 13   AST U/L 13 13 11     The goals of rheumatoid arthritis treatment include:  Relieving pain and suppressing inflammation  Achieving remission and preventing joint and organ damage  Increasing joint mobility and strength  Preventing infection and other complications of treatment  Reducing long term complications of rheumatoid arthritis  Improving or maintaining physical function and optimal well-being  Improving or maintaining quality of life  Maintaining optimal therapy adherence  Minimizing and managing side effects    Goals of Therapy Status: Discussed (new start)    Assessment/Plan  Patient plans to start therapy on 10/18/22      Indication, dosage, appropriateness, effectiveness, safety and convenience of her specialty medication(s) were reviewed today. "     Patient Education   Patient received education on the following:   Expectations and possible outcomes of therapy  Proper use, timely administration, and missed dose management  Duration of therapy  Side effects, including prevention, minimization, and management  Contraindications and safety precautions  New or changed medications, including prescribe and over the counter medications and supplements  Reviews recommended vaccinations, as appropriate  Storage, safe handling, and disposal    Tasks added this encounter   11/10/2022 - Refill Call (Auto Added)   Tasks due within next 3 months   No tasks due.     Jose Alexandra, PharmD  [facility]  [address] [place] [id number]  Phone: [phone]  Fax: [phone]

## 2022-10-14 NOTE — TELEPHONE ENCOUNTER
----- Message from Tim Nguyen sent at 10/14/2022 10:22 AM CDT -----  Contact: Franki/ Rigoberto Doan with [employer] is calling to speak with the nurse regarding prior authorization for Actemra. Please give Franki a call to further discuss at [phone] reference # [id number]

## 2022-10-17 LAB
ANCA AB TITR SER IF: ABNORMAL TITER
MYELOPEROXIDASE AB SER-ACNC: 22 UNITS
P-ANCA TITR SER IF: ABNORMAL TITER

## 2022-10-18 LAB — IL6 SERPL-MCNC: <1 PG/ML

## 2022-11-10 ENCOUNTER — SPECIALTY PHARMACY (OUTPATIENT)
Dept: PHARMACY | Facility: CLINIC | Age: 47
End: 2022-11-10
Payer: MEDICARE

## 2022-11-10 ENCOUNTER — HOSPITAL ENCOUNTER (OUTPATIENT)
Dept: RADIOLOGY | Facility: HOSPITAL | Age: 47
Discharge: HOME | End: 2022-11-10
Attending: INTERNAL MEDICINE
Payer: MEDICARE

## 2022-11-10 ENCOUNTER — OFFICE VISIT (OUTPATIENT)
Dept: RHEUMATOLOGY | Facility: CLINIC | Age: 47
End: 2022-11-10
Payer: MEDICARE

## 2022-11-10 ENCOUNTER — TELEPHONE (OUTPATIENT)
Dept: RHEUMATOLOGY | Facility: CLINIC | Age: 47
End: 2022-11-10
Payer: MEDICARE

## 2022-11-10 VITALS
BODY MASS INDEX: 39.03 KG/M2 | WEIGHT: 213.38 LBS | DIASTOLIC BLOOD PRESSURE: 100 MMHG | SYSTOLIC BLOOD PRESSURE: 158 MMHG | HEART RATE: 77 BPM

## 2022-11-10 DIAGNOSIS — M31.7 MICROSCOPIC POLYANGIITIS: ICD-10-CM

## 2022-11-10 DIAGNOSIS — M06.09 RHEUMATOID ARTHRITIS OF MULTIPLE SITES WITH NEGATIVE RHEUMATOID FACTOR: Primary | ICD-10-CM

## 2022-11-10 DIAGNOSIS — M06.09 RHEUMATOID ARTHRITIS OF MULTIPLE SITES WITH NEGATIVE RHEUMATOID FACTOR: ICD-10-CM

## 2022-11-10 DIAGNOSIS — D84.9 IMMUNOCOMPROMISED: ICD-10-CM

## 2022-11-10 DIAGNOSIS — Z79.899 LONG TERM CURRENT USE OF IMMUNOSUPPRESSIVE DRUG: ICD-10-CM

## 2022-11-10 DIAGNOSIS — D68.61 ANTIPHOSPHOLIPID ANTIBODY WITH HYPERCOAGULABLE STATE: ICD-10-CM

## 2022-11-10 DIAGNOSIS — M79.7 FIBROMYALGIA: ICD-10-CM

## 2022-11-10 PROCEDURE — 3080F PR MOST RECENT DIASTOLIC BLOOD PRESSURE >= 90 MM HG: ICD-10-PCS | Mod: CPTII,S$GLB,, | Performed by: INTERNAL MEDICINE

## 2022-11-10 PROCEDURE — 99999 PR PBB SHADOW E&M-EST. PATIENT-LVL IV: CPT | Mod: PBBFAC,,, | Performed by: INTERNAL MEDICINE

## 2022-11-10 PROCEDURE — 1160F PR REVIEW ALL MEDS BY PRESCRIBER/CLIN PHARMACIST DOCUMENTED: ICD-10-PCS | Mod: CPTII,S$GLB,, | Performed by: INTERNAL MEDICINE

## 2022-11-10 PROCEDURE — 3080F DIAST BP >= 90 MM HG: CPT | Mod: CPTII,S$GLB,, | Performed by: INTERNAL MEDICINE

## 2022-11-10 PROCEDURE — 73521 X-RAY EXAM HIPS BI 2 VIEWS: CPT | Mod: 26,,, | Performed by: RADIOLOGY

## 2022-11-10 PROCEDURE — 3008F BODY MASS INDEX DOCD: CPT | Mod: CPTII,S$GLB,, | Performed by: INTERNAL MEDICINE

## 2022-11-10 PROCEDURE — 1159F PR MEDICATION LIST DOCUMENTED IN MEDICAL RECORD: ICD-10-PCS | Mod: CPTII,S$GLB,, | Performed by: INTERNAL MEDICINE

## 2022-11-10 PROCEDURE — 4010F ACE/ARB THERAPY RXD/TAKEN: CPT | Mod: CPTII,S$GLB,, | Performed by: INTERNAL MEDICINE

## 2022-11-10 PROCEDURE — 3008F PR BODY MASS INDEX (BMI) DOCUMENTED: ICD-10-PCS | Mod: CPTII,S$GLB,, | Performed by: INTERNAL MEDICINE

## 2022-11-10 PROCEDURE — 1160F RVW MEDS BY RX/DR IN RCRD: CPT | Mod: CPTII,S$GLB,, | Performed by: INTERNAL MEDICINE

## 2022-11-10 PROCEDURE — 99215 OFFICE O/P EST HI 40 MIN: CPT | Mod: S$GLB,,, | Performed by: INTERNAL MEDICINE

## 2022-11-10 PROCEDURE — 3077F PR MOST RECENT SYSTOLIC BLOOD PRESSURE >= 140 MM HG: ICD-10-PCS | Mod: CPTII,S$GLB,, | Performed by: INTERNAL MEDICINE

## 2022-11-10 PROCEDURE — 73521 X-RAY EXAM HIPS BI 2 VIEWS: CPT | Mod: TC

## 2022-11-10 PROCEDURE — 99215 PR OFFICE/OUTPT VISIT, EST, LEVL V, 40-54 MIN: ICD-10-PCS | Mod: S$GLB,,, | Performed by: INTERNAL MEDICINE

## 2022-11-10 PROCEDURE — 4010F PR ACE/ARB THEARPY RXD/TAKEN: ICD-10-PCS | Mod: CPTII,S$GLB,, | Performed by: INTERNAL MEDICINE

## 2022-11-10 PROCEDURE — 99999 PR PBB SHADOW E&M-EST. PATIENT-LVL IV: ICD-10-PCS | Mod: PBBFAC,,, | Performed by: INTERNAL MEDICINE

## 2022-11-10 PROCEDURE — 73030 X-RAY EXAM OF SHOULDER: CPT | Mod: 26,50,, | Performed by: RADIOLOGY

## 2022-11-10 PROCEDURE — 1159F MED LIST DOCD IN RCRD: CPT | Mod: CPTII,S$GLB,, | Performed by: INTERNAL MEDICINE

## 2022-11-10 PROCEDURE — 73521 XR HIPS BILATERAL 2 VIEW INCL AP PELVIS: ICD-10-PCS | Mod: 26,,, | Performed by: RADIOLOGY

## 2022-11-10 PROCEDURE — 3077F SYST BP >= 140 MM HG: CPT | Mod: CPTII,S$GLB,, | Performed by: INTERNAL MEDICINE

## 2022-11-10 PROCEDURE — 73030 XR SHOULDER COMPLETE 2 OR MORE VIEWS BILATERAL: ICD-10-PCS | Mod: 26,50,, | Performed by: RADIOLOGY

## 2022-11-10 PROCEDURE — 73030 X-RAY EXAM OF SHOULDER: CPT | Mod: TC,50

## 2022-11-10 RX ORDER — ATORVASTATIN CALCIUM 10 MG/1
TABLET, FILM COATED ORAL
COMMUNITY
Start: 2022-10-11

## 2022-11-10 RX ORDER — CYCLOBENZAPRINE HCL 10 MG
10 TABLET ORAL 3 TIMES DAILY PRN
Qty: 90 TABLET | Refills: 11 | Status: SHIPPED | OUTPATIENT
Start: 2022-11-10 | End: 2023-09-07

## 2022-11-10 RX ORDER — PREDNISONE 10 MG/1
10 TABLET ORAL DAILY
Qty: 30 TABLET | Refills: 2 | Status: SHIPPED | OUTPATIENT
Start: 2022-11-10 | End: 2022-12-14

## 2022-11-10 RX ORDER — MYCOPHENOLATE MOFETIL 500 MG/1
1000 TABLET ORAL 2 TIMES DAILY
Qty: 120 TABLET | Refills: 3 | Status: SHIPPED | OUTPATIENT
Start: 2022-11-10 | End: 2023-03-14 | Stop reason: SDUPTHER

## 2022-11-10 NOTE — TELEPHONE ENCOUNTER
Discussed Actemra infusion administration and schedule. Potential side effects and infection control measures to take. Pt is scheduled for her first dose on 12/01/22 pending ins. Approval. Will move to a sooner date if approved.

## 2022-11-10 NOTE — TELEPHONE ENCOUNTER
----- Message from Ruperto Noguera MD sent at 11/10/2022 11:37 AM CST -----  Please start actemra asap.

## 2022-11-10 NOTE — TELEPHONE ENCOUNTER
Outgoing Call: Spoke with patient regarding refill, she has about 14 tabs on hand. I informed her that a refill request was sent to her doctor and once approved [facility] will follow up.

## 2022-11-11 ENCOUNTER — TELEPHONE (OUTPATIENT)
Dept: HEMATOLOGY/ONCOLOGY | Facility: CLINIC | Age: 47
End: 2022-11-11
Payer: MEDICARE

## 2022-11-11 NOTE — TELEPHONE ENCOUNTER
Specialty Pharmacy - Refill Coordination    Specialty Medication Orders Linked to Encounter      Flowsheet Row Most Recent Value   Medication #1 mycophenolate (CELLCEPT) 500 mg Tab (Order#[id number], Rx#)            Refill Questions - Documented Responses      Flowsheet Row Most Recent Value   Patient Availability and HIPAA Verification    Does patient want to proceed with activity? Yes   HIPAA/medical authority confirmed? Yes   Relationship to patient of person spoken to? Self   Refill Screening Questions    Changes to allergies? No   Changes to medications? No   New conditions since last clinic visit? No   Unplanned office visit, urgent care, ED, or hospital admission in the last 4 weeks? No   How does patient/caregiver feel medication is working? Good   Financial problems or insurance changes? No   How many doses of your specialty medications were missed in the last 4 weeks? 0   Would patient like to speak to a pharmacist? No   When does the patient need to receive the medication? 11/15/22   Refill Delivery Questions    How will the patient receive the medication? MEDRx   When does the patient need to receive the medication? 11/15/22   Shipping Address Home   Address in [facility] confirmed and updated if neccessary? Yes   Expected Copay ($) 1.35   Is the patient able to afford the medication copay? Yes   Payment Method CC on file   Days supply of Refill 30   Supplies needed? No supplies needed   Refill activity completed? Yes   Refill activity plan Refill scheduled   Shipment/Pickup Date: 11/14/22            Current Outpatient Medications   Medication Sig    amlodipine-valsartan (EXFORGE)  mg per tablet Take 1 tablet by mouth once daily.    aspirin 325 MG tablet     atorvastatin (LIPITOR) 10 MG tablet     cholecalciferol, vitamin D3, 1,250 mcg (50,000 unit) capsule TAKE 1 CAPSULE BY MOUTH ONCE A WEEK FOR VIT. D DEFICIENCY    clonazePAM (KLONOPIN) 1 MG tablet TAKE 1 TABLET BY MOUTH ONCE DAILY AT  BEDTIME AS NEEDED FOR ANXIETY SLEEP    cyclobenzaprine (FLEXERIL) 10 MG tablet Take 1 tablet (10 mg total) by mouth 3 (three) times daily as needed for Muscle spasms.    DULoxetine (CYMBALTA) 20 MG capsule Take 20 mg by mouth once daily.    FEROSUL 325 mg (65 mg iron) Tab tablet TAKE 2 TABLETS BY MOUTH DAILY FOR IRON DEFICIENCY    LIDOcaine (LIDODERM) 5 % 1 patch once daily.    mycophenolate (CELLCEPT) 500 mg Tab Take 2 tablets (1,000 mg total) by mouth 2 (two) times daily.    nebivoloL (BYSTOLIC) 10 MG Tab Take 10 mg by mouth once daily.    ondansetron (ZOFRAN) 4 MG tablet Take 1 tablet (4 mg total) by mouth every 8 (eight) hours as needed for Nausea.    potassium chloride SA (K-DUR,KLOR-CON) 20 MEQ tablet Take 1 tablet (20 mEq total) by mouth 2 (two) times daily.    predniSONE (DELTASONE) 10 MG tablet Take 1 tablet (10 mg total) by mouth once daily.    sertraline (ZOLOFT) 50 MG tablet Take 100 mg by mouth once daily.    temazepam (RESTORIL) 15 mg Cap 30 mg.   Last reviewed on 11/10/2022 11:15 AM by Ruperto Noguera MD    Review of patient's allergies indicates:   Allergen Reactions    Oranges [orange] Hives    Last reviewed on  11/10/2022 3:50 PM by Maribel Nuñez      Tasks added this encounter   No tasks added.   Tasks due within next 3 months   11/10/2022 - Refill Call (Auto Added)     Jose Alexandra, PharmD  [facility]  [address] [place] [id number]  Phone: [phone]  Fax: [phone]

## 2022-11-11 NOTE — TELEPHONE ENCOUNTER
Cellcept refill **dose increase**  -PA not required.  -Copay $1.35   -Patient educated on dose increase.

## 2022-11-11 NOTE — TELEPHONE ENCOUNTER
Spoke to patient in reference to Hematology referral from Dr. LÓPEZ.  Appointment scheduled per patient's request next available.  Appointment notice via [facility].

## 2022-11-15 ENCOUNTER — PATIENT MESSAGE (OUTPATIENT)
Dept: HEMATOLOGY/ONCOLOGY | Facility: CLINIC | Age: 47
End: 2022-11-15

## 2022-11-15 ENCOUNTER — OFFICE VISIT (OUTPATIENT)
Dept: HEMATOLOGY/ONCOLOGY | Facility: CLINIC | Age: 47
End: 2022-11-15
Payer: MEDICARE

## 2022-11-15 DIAGNOSIS — D68.61 ANTIPHOSPHOLIPID ANTIBODY WITH HYPERCOAGULABLE STATE: ICD-10-CM

## 2022-11-15 PROCEDURE — 99204 OFFICE O/P NEW MOD 45 MIN: CPT | Mod: 95,,, | Performed by: INTERNAL MEDICINE

## 2022-11-15 PROCEDURE — 4010F ACE/ARB THERAPY RXD/TAKEN: CPT | Mod: CPTII,95,, | Performed by: INTERNAL MEDICINE

## 2022-11-15 PROCEDURE — 4010F PR ACE/ARB THEARPY RXD/TAKEN: ICD-10-PCS | Mod: CPTII,95,, | Performed by: INTERNAL MEDICINE

## 2022-11-15 PROCEDURE — 99204 PR OFFICE/OUTPT VISIT, NEW, LEVL IV, 45-59 MIN: ICD-10-PCS | Mod: 95,,, | Performed by: INTERNAL MEDICINE

## 2022-11-15 NOTE — PROGRESS NOTES
The patient location is: home    Visit type: audio only    Face to Face time with patient:   30 minutes of total time spent on the encounter, which includes face to face time and non-face to face time preparing to see the patient (eg, review of tests), Obtaining and/or reviewing separately obtained history, Documenting clinical information in the electronic or other health record, Independently interpreting results (not separately reported) and communicating results to the patient/family/caregiver, or Care coordination (not separately reported).         Each patient to whom he or she provides medical services by telemedicine is:  (1) informed of the relationship between the physician and patient and the respective role of any other health care provider with respect to management of the patient; and (2) notified that he or she may decline to receive medical services by telemedicine and may withdraw from such care at any time.    Subjective:     Patient ID: Denise Flower is a 47 y.o. female with h/o Lupus  Consultation for positive APL antibody(per rheumatology)    Chief Complaint: diffuse joint pains and new med actemra planned by rheumatology    HPI:    Pt does not have any h/o thrombosis or pregnancy losses.   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  She completed Cytoxan induction therapy for 6 months.  Mild to moderate improvement of her arthritis.  Failed Rituxan and Imuran.  On prednisone 10 mg twice daily and CellCept for the past 4 weeks.  Mild to moderate improvement in arthritis.  Still has persistent joint pain, swelling and stiffness.  No neuropathy, abdominal ischemia, skin rash, ulcers.   Rheumatological review of system negative otherwise.  Physical examination shows mild synovitis over multiple MCP and PIP joints.  Tenderness all over.    1. Rheumatoid arthritis of multiple sites with negative  rheumatoid factor    2. Microscopic polyangiitis    3. Immunocompromised    4. Long term current use of immunosuppressive drug    5. Fibromyalgia    6. Antiphospholipid antibody with hypercoagulable state        Interval History:    Social History     Socioeconomic History    Marital status: Single   Tobacco Use    Smoking status: Never    Smokeless tobacco: Never   Substance and Sexual Activity    Alcohol use: Not Currently       Family History   Problem Relation Age of Onset    Hypertension Mother     Cancer Father         esophegal       Past Surgical History:   Procedure Laterality Date     SECTION      TUBAL LIGATION         Past Medical History:   Diagnosis Date    Hypertension     Stroke            Current Outpatient Medications on File Prior to Visit   Medication Sig Dispense Refill    amlodipine-valsartan (EXFORGE)  mg per tablet Take 1 tablet by mouth once daily.      aspirin 325 MG tablet       atorvastatin (LIPITOR) 10 MG tablet       cholecalciferol, vitamin D3, 1,250 mcg (50,000 unit) capsule TAKE 1 CAPSULE BY MOUTH ONCE A WEEK FOR VIT. D DEFICIENCY      clonazePAM (KLONOPIN) 1 MG tablet TAKE 1 TABLET BY MOUTH ONCE DAILY AT BEDTIME AS NEEDED FOR ANXIETY SLEEP      cyclobenzaprine (FLEXERIL) 10 MG tablet Take 1 tablet (10 mg total) by mouth 3 (three) times daily as needed for Muscle spasms. 90 tablet 11    DULoxetine (CYMBALTA) 20 MG capsule Take 20 mg by mouth once daily.      FEROSUL 325 mg (65 mg iron) Tab tablet TAKE 2 TABLETS BY MOUTH DAILY FOR IRON DEFICIENCY      LIDOcaine (LIDODERM) 5 % 1 patch once daily.      mycophenolate (CELLCEPT) 500 mg Tab Take 2 tablets (1,000 mg total) by mouth 2 (two) times daily. 120 tablet 3    nebivoloL (BYSTOLIC) 10 MG Tab Take 10 mg by mouth once daily.      ondansetron (ZOFRAN) 4 MG tablet Take 1 tablet (4 mg total) by mouth every 8 (eight) hours as needed for Nausea. 30 tablet 1    potassium chloride SA (K-DUR,KLOR-CON) 20 MEQ tablet Take 1  tablet (20 mEq total) by mouth 2 (two) times daily. 60 tablet 6    predniSONE (DELTASONE) 10 MG tablet Take 1 tablet (10 mg total) by mouth once daily. 30 tablet 2    sertraline (ZOLOFT) 50 MG tablet Take 100 mg by mouth once daily.      temazepam (RESTORIL) 15 mg Cap 30 mg.       No current facility-administered medications on file prior to visit.         No visits with results within 1 Day(s) from this visit.   Latest known visit with results is:   Lab Visit on 11/10/2022   Component Date Value Ref Range Status    Follicle Stimulating Hormone 11/10/2022 96.47  See Text mIU/mL Final    LH 11/10/2022 62.8  See Text mIU/mL Final    WBC 11/10/2022 9.11  3.90 - 12.70 K/uL Final    RBC 11/10/2022 4.66  4.00 - 5.40 M/uL Final    Hemoglobin 11/10/2022 12.9  12.0 - 16.0 g/dL Final    Hematocrit 11/10/2022 39.6  37.0 - 48.5 % Final    MCV 11/10/2022 85  82 - 98 fL Final    MCH 11/10/2022 27.7  27.0 - 31.0 pg Final    MCHC 11/10/2022 32.6  32.0 - 36.0 g/dL Final    RDW 11/10/2022 14.6 (H)  11.5 - 14.5 % Final    Platelets 11/10/2022 354  150 - 450 K/uL Final    MPV 11/10/2022 10.9  9.2 - 12.9 fL Final    Immature Granulocytes 11/10/2022 0.3  0.0 - 0.5 % Final    Gran # (ANC) 11/10/2022 5.4  1.8 - 7.7 K/uL Final    Immature Grans (Abs) 11/10/2022 0.03  0.00 - 0.04 K/uL Final    Lymph # 11/10/2022 2.3  1.0 - 4.8 K/uL Final    Mono # 11/10/2022 1.4 (H)  0.3 - 1.0 K/uL Final    Eos # 11/10/2022 0.0  0.0 - 0.5 K/uL Final    Baso # 11/10/2022 0.04  0.00 - 0.20 K/uL Final    nRBC 11/10/2022 0  0 /100 WBC Final    Gran % 11/10/2022 59.3  38.0 - 73.0 % Final    Lymph % 11/10/2022 24.9  18.0 - 48.0 % Final    Mono % 11/10/2022 15.0  4.0 - 15.0 % Final    Eosinophil % 11/10/2022 0.1  0.0 - 8.0 % Final    Basophil % 11/10/2022 0.4  0.0 - 1.9 % Final    Differential Method 11/10/2022 Automated   Final    Sodium 11/10/2022 142  136 - 145 mmol/L Final    Potassium 11/10/2022 2.9 (L)  3.5 - 5.1 mmol/L Final    Chloride 11/10/2022 103  95 -  110 mmol/L Final    CO2 11/10/2022 28  23 - 29 mmol/L Final    Glucose 11/10/2022 77  70 - 110 mg/dL Final    BUN 11/10/2022 17  6 - 20 mg/dL Final    Creatinine 11/10/2022 0.9  0.5 - 1.4 mg/dL Final    Calcium 11/10/2022 9.9  8.7 - 10.5 mg/dL Final    Total Protein 11/10/2022 8.0  6.0 - 8.4 g/dL Final    Albumin 11/10/2022 3.8  3.5 - 5.2 g/dL Final    Total Bilirubin 11/10/2022 0.3  0.1 - 1.0 mg/dL Final    Alkaline Phosphatase 11/10/2022 95  55 - 135 U/L Final    AST 11/10/2022 13  10 - 40 U/L Final    ALT 11/10/2022 19  10 - 44 U/L Final    Anion Gap 11/10/2022 11  8 - 16 mmol/L Final    eGFR 11/10/2022 >60  >60 mL/min/1.73 m^2 Final    CRP 11/10/2022 14.6 (H)  0.0 - 8.2 mg/L Final    Sed Rate 11/10/2022 60 (H)  0 - 36 mm/Hr Final        Objective:     Review of Systems:   Negative unless otherwise noted positive-  Gen- Weakness/ Fatigue  Neuro- Confusion  CV- Chest Pain/ Palpitations  Resp: Cough/ SOB  GI- Nausea/Vomiting  Extrem- Pain/Swelling    There were no vitals filed for this visit.    Physical Exam:  General- Patient alert and oriented x3 in NAD  HEENT- PERRLA, EOMI, OP clear  Resp- No increased WOB noted. Not using accessory muscles.  GI- Non tender/non-distended  Extrem- No cyanosis, clubbing, edema.   Skin-  No Jaundice. No visible skin lesions.      Lab Results   Component Value Date    WBC 9.11 11/10/2022    HGB 12.9 11/10/2022    HCT 39.6 11/10/2022    MCV 85 11/10/2022     11/10/2022       Recent Results (from the past 336 hour(s))   CBC Auto Differential    Collection Time: 11/10/22 12:10 PM   Result Value Ref Range    WBC 9.11 3.90 - 12.70 K/uL    Hemoglobin 12.9 12.0 - 16.0 g/dL    Hematocrit 39.6 37.0 - 48.5 %    Platelets 354 150 - 450 K/uL      Lab Visit on 11/10/2022   Component Date Value    Follicle Stimulating Hor* 11/10/2022 96.47     LH 11/10/2022 62.8     WBC 11/10/2022 9.11     RBC 11/10/2022 4.66     Hemoglobin 11/10/2022 12.9     Hematocrit 11/10/2022 39.6     MCV 11/10/2022  85     MCH 11/10/2022 27.7     MCHC 11/10/2022 32.6     RDW 11/10/2022 14.6 (H)     Platelets 11/10/2022 354     MPV 11/10/2022 10.9     Immature Granulocytes 11/10/2022 0.3     Gran # (ANC) 11/10/2022 5.4     Immature Grans (Abs) 11/10/2022 0.03     Lymph # 11/10/2022 2.3     Mono # 11/10/2022 1.4 (H)     Eos # 11/10/2022 0.0     Baso # 11/10/2022 0.04     nRBC 11/10/2022 0     Gran % 11/10/2022 59.3     Lymph % 11/10/2022 24.9     Mono % 11/10/2022 15.0     Eosinophil % 11/10/2022 0.1     Basophil % 11/10/2022 0.4     Differential Method 11/10/2022 Automated     Sodium 11/10/2022 142     Potassium 11/10/2022 2.9 (L)     Chloride 11/10/2022 103     CO2 11/10/2022 28     Glucose 11/10/2022 77     BUN 11/10/2022 17     Creatinine 11/10/2022 0.9     Calcium 11/10/2022 9.9     Total Protein 11/10/2022 8.0     Albumin 11/10/2022 3.8     Total Bilirubin 11/10/2022 0.3     Alkaline Phosphatase 11/10/2022 95     AST 11/10/2022 13     ALT 11/10/2022 19     Anion Gap 11/10/2022 11     eGFR 11/10/2022 >60     CRP 11/10/2022 14.6 (H)     Sed Rate 11/10/2022 60 (H)    Lab Visit on 10/12/2022   Component Date Value    Cytoplasmic Neutrophilic* 10/12/2022 See Below (A)     Perinuclear (P-ANCA) 10/12/2022 See Below (A)     ANCA Proteinase 3 10/12/2022 <0.2     MPO 10/12/2022 22 (H)     ED Screen 10/12/2022 Positive (A)     WBC 10/12/2022 6.83     RBC 10/12/2022 4.39     Hemoglobin 10/12/2022 12.2     Hematocrit 10/12/2022 38.1     MCV 10/12/2022 87     MCH 10/12/2022 27.8     MCHC 10/12/2022 32.0     RDW 10/12/2022 14.7 (H)     Platelets 10/12/2022 309     MPV 10/12/2022 10.9     Immature Granulocytes 10/12/2022 0.4     Gran # (ANC) 10/12/2022 4.9     Immature Grans (Abs) 10/12/2022 0.03     Lymph # 10/12/2022 1.4     Mono # 10/12/2022 0.4     Eos # 10/12/2022 0.1     Baso # 10/12/2022 0.03     nRBC 10/12/2022 0     Gran % 10/12/2022 71.9     Lymph % 10/12/2022 20.4     Mono % 10/12/2022 5.6     Eosinophil % 10/12/2022 1.3      Basophil % 10/12/2022 0.4     Differential Method 10/12/2022 Automated     Sodium 10/12/2022 143     Potassium 10/12/2022 3.6     Chloride 10/12/2022 107     CO2 10/12/2022 26     Glucose 10/12/2022 103     BUN 10/12/2022 15     Creatinine 10/12/2022 1.0     Calcium 10/12/2022 9.2     Total Protein 10/12/2022 7.3     Albumin 10/12/2022 3.7     Total Bilirubin 10/12/2022 0.3     Alkaline Phosphatase 10/12/2022 89     AST 10/12/2022 13     ALT 10/12/2022 18     Anion Gap 10/12/2022 10     eGFR 10/12/2022 >60     Sed Rate 10/12/2022 67 (H)     CRP 10/12/2022 9.0 (H)     Rheumatoid Factor 10/12/2022 <13.0     CCP Antibodies 10/12/2022 <0.5     Interleukin 6 10/12/2022 <1.0     ED PATTERN 1 10/12/2022 Homogeneous     Anti Sm Antibody 10/12/2022 0.09     Anti-Sm Interpretation 10/12/2022 Negative     Anti-SSA Antibody 10/12/2022 0.07     Anti-SSA Interpretation 10/12/2022 Negative     Anti-SSB Antibody 10/12/2022 0.06     Anti-SSB Interpretation 10/12/2022 Negative     ds DNA Ab 10/12/2022 Negative 1:10     Anti Sm/RNP Antibody 10/12/2022 0.11     Anti-Sm/RNP Interpretati* 10/12/2022 Negative     ED Titer 1 10/12/2022 1:640    Lab Visit on 09/26/2022   Component Date Value    Specimen UA 09/26/2022 Urine, Clean Catch     Color, UA 09/26/2022 Yellow     Appearance, UA 09/26/2022 Clear     pH, UA 09/26/2022 7.0     Specific Gravity, UA 09/26/2022 1.015     Protein, UA 09/26/2022 1+ (A)     Glucose, UA 09/26/2022 Negative     Ketones, UA 09/26/2022 Negative     Bilirubin (UA) 09/26/2022 Negative     Occult Blood UA 09/26/2022 Negative     Nitrite, UA 09/26/2022 Negative     Leukocytes, UA 09/26/2022 Negative     RBC, UA 09/26/2022 0     WBC, UA 09/26/2022 1     Bacteria 09/26/2022 Rare     Squam Epithel, UA 09/26/2022 1     Hyaline Casts, UA 09/26/2022 0     Microscopic Comment 09/26/2022 SEE COMMENT    Lab Visit on 09/12/2022   Component Date Value    WBC 09/12/2022 4.09     RBC 09/12/2022 4.14     Hemoglobin 09/12/2022  11.5 (L)     Hematocrit 09/12/2022 35.9 (L)     MCV 09/12/2022 87     MCH 09/12/2022 27.8     MCHC 09/12/2022 32.0     RDW 09/12/2022 15.0 (H)     Platelets 09/12/2022 325     MPV 09/12/2022 11.4     Immature Granulocytes 09/12/2022 0.2     Gran # (ANC) 09/12/2022 1.7 (L)     Immature Grans (Abs) 09/12/2022 0.01     Lymph # 09/12/2022 1.6     Mono # 09/12/2022 0.7     Eos # 09/12/2022 0.1     Baso # 09/12/2022 0.03     nRBC 09/12/2022 0     Gran % 09/12/2022 40.9     Lymph % 09/12/2022 37.9     Mono % 09/12/2022 17.4 (H)     Eosinophil % 09/12/2022 2.9     Basophil % 09/12/2022 0.7     Differential Method 09/12/2022 Automated     Sodium 09/12/2022 143     Potassium 09/12/2022 4.0     Chloride 09/12/2022 107     CO2 09/12/2022 25     Glucose 09/12/2022 104     BUN 09/12/2022 12     Creatinine 09/12/2022 1.0     Calcium 09/12/2022 9.2     Total Protein 09/12/2022 6.8     Albumin 09/12/2022 3.6     Total Bilirubin 09/12/2022 0.4     Alkaline Phosphatase 09/12/2022 76     AST 09/12/2022 13     ALT 09/12/2022 16     Anion Gap 09/12/2022 11     eGFR 09/12/2022 >60     CRP 09/12/2022 21.9 (H)     Sed Rate 09/12/2022 77 (H)    Lab Visit on 09/12/2022   Component Date Value    Protein, Urine Random 09/12/2022 117 (H)     Creatinine, Urine 09/12/2022 244.0     Prot/Creat Ratio, Urine 09/12/2022 0.48 (H)     Specimen UA 09/12/2022 Urine, Clean Catch     Color, UA 09/12/2022 Yellow     Appearance, UA 09/12/2022 Hazy (A)     pH, UA 09/12/2022 6.0     Specific Gravity, UA 09/12/2022 >=1.030 (A)     Protein, UA 09/12/2022 2+ (A)     Glucose, UA 09/12/2022 Negative     Ketones, UA 09/12/2022 Negative     Bilirubin (UA) 09/12/2022 Negative     Occult Blood UA 09/12/2022 Trace (A)     Nitrite, UA 09/12/2022 Negative     Leukocytes, UA 09/12/2022 Negative     RBC, UA 09/12/2022 5 (H)     WBC, UA 09/12/2022 10 (H)     Bacteria 09/12/2022 Moderate (A)     Squam Epithel, UA 09/12/2022 5     Hyaline Casts, UA 09/12/2022 0     Microscopic  Comment 09/12/2022 SEE COMMENT    Lab Visit on 08/15/2022   Component Date Value    Protein, Urine Random 08/15/2022 21 (H)     Creatinine, Urine 08/15/2022 31.0     Prot/Creat Ratio, Urine 08/15/2022 0.68 (H)     Specimen UA 08/15/2022 Urine, Clean Catch     Color, UA 08/15/2022 Yellow     Appearance, UA 08/15/2022 Clear     pH, UA 08/15/2022 7.0     Specific Gravity, UA 08/15/2022 1.010     Protein, UA 08/15/2022 Trace (A)     Glucose, UA 08/15/2022 Negative     Ketones, UA 08/15/2022 Negative     Bilirubin (UA) 08/15/2022 Negative     Occult Blood UA 08/15/2022 Negative     Nitrite, UA 08/15/2022 Negative     Leukocytes, UA 08/15/2022 Negative    Lab Visit on 08/15/2022   Component Date Value    WBC 08/15/2022 9.11     RBC 08/15/2022 4.20     Hemoglobin 08/15/2022 11.4 (L)     Hematocrit 08/15/2022 36.3 (L)     MCV 08/15/2022 86     MCH 08/15/2022 27.1     MCHC 08/15/2022 31.4 (L)     RDW 08/15/2022 15.5 (H)     Platelets 08/15/2022 273     MPV 08/15/2022 11.5     Immature Granulocytes 08/15/2022 0.4     Gran # (ANC) 08/15/2022 5.2     Immature Grans (Abs) 08/15/2022 0.04     Lymph # 08/15/2022 2.5     Mono # 08/15/2022 1.3 (H)     Eos # 08/15/2022 0.0     Baso # 08/15/2022 0.03     nRBC 08/15/2022 0     Gran % 08/15/2022 56.6     Lymph % 08/15/2022 27.8     Mono % 08/15/2022 14.6     Eosinophil % 08/15/2022 0.3     Basophil % 08/15/2022 0.3     Differential Method 08/15/2022 Automated     Sodium 08/15/2022 141     Potassium 08/15/2022 3.3 (L)     Chloride 08/15/2022 103     CO2 08/15/2022 27     Glucose 08/15/2022 95     BUN 08/15/2022 12     Creatinine 08/15/2022 0.8     Calcium 08/15/2022 8.8     Total Protein 08/15/2022 7.1     Albumin 08/15/2022 3.4 (L)     Total Bilirubin 08/15/2022 0.3     Alkaline Phosphatase 08/15/2022 74     AST 08/15/2022 11     ALT 08/15/2022 13     Anion Gap 08/15/2022 11     eGFR 08/15/2022 >60     CRP 08/15/2022 8.6 (H)     Sed Rate 08/15/2022 60 (H)    Lab Visit on 07/11/2022    Component Date Value    Specimen UA 07/11/2022 Urine, Clean Catch     Color, UA 07/11/2022 Yellow     Appearance, UA 07/11/2022 Clear     pH, UA 07/11/2022 6.0     Specific Gravity, UA 07/11/2022 1.025     Protein, UA 07/11/2022 2+ (A)     Glucose, UA 07/11/2022 Negative     Ketones, UA 07/11/2022 Negative     Bilirubin (UA) 07/11/2022 Negative     Occult Blood UA 07/11/2022 Negative     Nitrite, UA 07/11/2022 Negative     Leukocytes, UA 07/11/2022 Negative     RBC, UA 07/11/2022 3     WBC, UA 07/11/2022 3     Bacteria 07/11/2022 Occasional     Squam Epithel, UA 07/11/2022 5     Hyaline Casts, UA 07/11/2022 0     Microscopic Comment 07/11/2022 SEE COMMENT    Lab Visit on 07/11/2022   Component Date Value    WBC 07/11/2022 4.92     RBC 07/11/2022 4.38     Hemoglobin 07/11/2022 11.8 (L)     Hematocrit 07/11/2022 37.9     MCV 07/11/2022 87     MCH 07/11/2022 26.9 (L)     MCHC 07/11/2022 31.1 (L)     RDW 07/11/2022 14.9 (H)     Platelets 07/11/2022 287     MPV 07/11/2022 12.2     Immature Granulocytes 07/11/2022 0.4     Gran # (ANC) 07/11/2022 1.7 (L)     Immature Grans (Abs) 07/11/2022 0.02     Lymph # 07/11/2022 2.1     Mono # 07/11/2022 0.9     Eos # 07/11/2022 0.1     Baso # 07/11/2022 0.04     nRBC 07/11/2022 0     Gran % 07/11/2022 34.6 (L)     Lymph % 07/11/2022 42.5     Mono % 07/11/2022 18.9 (H)     Eosinophil % 07/11/2022 2.8     Basophil % 07/11/2022 0.8     Differential Method 07/11/2022 Automated     Sodium 07/11/2022 144     Potassium 07/11/2022 3.1 (L)     Chloride 07/11/2022 101     CO2 07/11/2022 29     Glucose 07/11/2022 103     BUN 07/11/2022 15     Creatinine 07/11/2022 1.3     Calcium 07/11/2022 9.2     Total Protein 07/11/2022 7.2     Albumin 07/11/2022 3.5     Total Bilirubin 07/11/2022 0.5     Alkaline Phosphatase 07/11/2022 72     AST 07/11/2022 13     ALT 07/11/2022 17     Anion Gap 07/11/2022 14     eGFR if  07/11/2022 56 (A)     eGFR if non African Amer* 07/11/2022 48 (A)      CRP 07/11/2022 22.5 (H)     Sed Rate 07/11/2022 36    Lab Visit on 06/03/2022   Component Date Value    Protein, Urine Random 06/03/2022 89 (H)     Creatinine, Urine 06/03/2022 138.0     Prot/Creat Ratio, Urine 06/03/2022 0.64 (H)     Specimen UA 06/03/2022 Urine, Clean Catch     Color, UA 06/03/2022 Yellow     Appearance, UA 06/03/2022 Clear     pH, UA 06/03/2022 7.0     Specific Gravity, UA 06/03/2022 1.015     Protein, UA 06/03/2022 2+ (A)     Glucose, UA 06/03/2022 Negative     Ketones, UA 06/03/2022 Negative     Bilirubin (UA) 06/03/2022 Negative     Occult Blood UA 06/03/2022 Negative     Nitrite, UA 06/03/2022 Negative     Leukocytes, UA 06/03/2022 Negative     RBC, UA 06/03/2022 1     WBC, UA 06/03/2022 1     Bacteria 06/03/2022 Rare     Squam Epithel, UA 06/03/2022 3     Hyaline Casts, UA 06/03/2022 0     Microscopic Comment 06/03/2022 SEE COMMENT    There may be more visits with results that are not included.      Lab Results   Component Value Date     11/10/2022    K 2.9 (L) 11/10/2022     11/10/2022    CO2 28 11/10/2022    BUN 17 11/10/2022    CREATININE 0.9 11/10/2022    CALCIUM 9.9 11/10/2022    ANIONGAP 11 11/10/2022    ESTGFRAFRICA 56 (A) 07/11/2022    EGFRNONAA 48 (A) 07/11/2022     Lab Results   Component Value Date    ALT 19 11/10/2022    AST 13 11/10/2022    ALKPHOS 95 11/10/2022    BILITOT 0.3 11/10/2022     No images are attached to the encounter.   Specimens (From admission, onward)      None               Assessment/Plan:   1. Antiphospholipid antibody with hypercoagulable state  -     Ambulatory referral/consult to Hematology / Oncology       Anti-Phospholipid antibody with h/o active Lupus  Pt does not have h/o of arterial/venuous thrombosis. Pt denies any pregnancy losses so by definition she does not have APS(syndrome)    We discussed the natural history of antibodies without syndrome. Recommended contibuing asa 325mg and we discussed benefit of anti-coagulation life long.  Pt declined however open to high risk period anti-coag.    I could not locate lab result showing positive Apl do ill repeat in 6 months    F/u in 6 months with repeat anti-phospholipib ab          Irma Saucedo MD

## 2022-11-30 ENCOUNTER — TELEPHONE (OUTPATIENT)
Dept: RHEUMATOLOGY | Facility: CLINIC | Age: 47
End: 2022-11-30
Payer: MEDICARE

## 2022-12-01 ENCOUNTER — INFUSION (OUTPATIENT)
Dept: INFUSION THERAPY | Facility: HOSPITAL | Age: 47
End: 2022-12-01
Attending: INTERNAL MEDICINE
Payer: MEDICARE

## 2022-12-01 VITALS
WEIGHT: 217.63 LBS | OXYGEN SATURATION: 98 % | HEART RATE: 65 BPM | BODY MASS INDEX: 39.8 KG/M2 | RESPIRATION RATE: 18 BRPM | DIASTOLIC BLOOD PRESSURE: 88 MMHG | TEMPERATURE: 98 F | SYSTOLIC BLOOD PRESSURE: 131 MMHG

## 2022-12-01 DIAGNOSIS — M06.09 RHEUMATOID ARTHRITIS OF MULTIPLE SITES WITH NEGATIVE RHEUMATOID FACTOR: Primary | ICD-10-CM

## 2022-12-01 PROCEDURE — A4216 STERILE WATER/SALINE, 10 ML: HCPCS | Performed by: INTERNAL MEDICINE

## 2022-12-01 PROCEDURE — 25000003 PHARM REV CODE 250: Performed by: STUDENT IN AN ORGANIZED HEALTH CARE EDUCATION/TRAINING PROGRAM

## 2022-12-01 PROCEDURE — 96365 THER/PROPH/DIAG IV INF INIT: CPT

## 2022-12-01 PROCEDURE — 63600175 PHARM REV CODE 636 W HCPCS: Mod: JG | Performed by: STUDENT IN AN ORGANIZED HEALTH CARE EDUCATION/TRAINING PROGRAM

## 2022-12-01 PROCEDURE — 25000003 PHARM REV CODE 250: Performed by: INTERNAL MEDICINE

## 2022-12-01 RX ORDER — HEPARIN 100 UNIT/ML
500 SYRINGE INTRAVENOUS
Status: CANCELLED | OUTPATIENT
Start: 2022-12-29

## 2022-12-01 RX ORDER — SODIUM CHLORIDE 0.9 % (FLUSH) 0.9 %
10 SYRINGE (ML) INJECTION
Status: CANCELLED | OUTPATIENT
Start: 2022-12-29

## 2022-12-01 RX ORDER — SODIUM CHLORIDE 0.9 % (FLUSH) 0.9 %
10 SYRINGE (ML) INJECTION
Status: DISCONTINUED | OUTPATIENT
Start: 2022-12-01 | End: 2022-12-01 | Stop reason: HOSPADM

## 2022-12-01 RX ADMIN — Medication 10 ML: at 09:12

## 2022-12-01 RX ADMIN — TOCILIZUMAB 800 MG: 20 INJECTION, SOLUTION, CONCENTRATE INTRAVENOUS at 08:12

## 2022-12-01 NOTE — NURSING
Infusion # 1     Any:  -recent illness, infection, or antibiotic use in past week- denies  -open wounds or mouth sores- denies  -invasive procedures or surgeries in past 4 weeks or in upcoming 4 weeks- denies  -vaccinations in past week- denies  -any new symptoms/change in symptoms-denies  -chance you may be pregnant- denies      Recent labs? 11/11/22  Last Rheumatology provider visit- Seen by Dr. LÓPEZ on 11/10/22     Premeds-none     actemra 800 mg administered IV at a 60 minute rate per orders; see MAR and vitals for more details. Tolerated well without adverse events, discharged and ambulatory out of clinic.

## 2022-12-08 ENCOUNTER — PATIENT MESSAGE (OUTPATIENT)
Dept: RHEUMATOLOGY | Facility: CLINIC | Age: 47
End: 2022-12-08
Payer: MEDICARE

## 2022-12-08 ENCOUNTER — SPECIALTY PHARMACY (OUTPATIENT)
Dept: PHARMACY | Facility: CLINIC | Age: 47
End: 2022-12-08
Payer: MEDICARE

## 2022-12-08 NOTE — TELEPHONE ENCOUNTER
Outgoing call regarding cellcept refill; per pt, she was unsure of how many she has on hand; informed her that [facility] will follow up on 12/9 to schedule delivery

## 2022-12-09 NOTE — TELEPHONE ENCOUNTER
Specialty Pharmacy - Refill Coordination    Specialty Medication Orders Linked to Encounter      Flowsheet Row Most Recent Value   Medication #1 mycophenolate (CELLCEPT) 500 mg Tab (Order#[id number], Rx#[id number])            Refill Questions - Documented Responses      Flowsheet Row Most Recent Value   Patient Availability and HIPAA Verification    Does patient want to proceed with activity? Yes   HIPAA/medical authority confirmed? Yes   Relationship to patient of person spoken to? Self   Refill Screening Questions    Changes to allergies? No   Changes to medications? No   New conditions since last clinic visit? No   Unplanned office visit, urgent care, ED, or hospital admission in the last 4 weeks? No   How does patient/caregiver feel medication is working? Good   Financial problems or insurance changes? No   How many doses of your specialty medications were missed in the last 4 weeks? 0   Would patient like to speak to a pharmacist? No   When does the patient need to receive the medication? 12/15/22   Refill Delivery Questions    How will the patient receive the medication? [facility]   When does the patient need to receive the medication? 12/15/22   Shipping Address Home   Address in [facility] confirmed and updated if neccessary? Yes   Expected Copay ($) 1.35   Is the patient able to afford the medication copay? Yes   Payment Method CC on file   Days supply of Refill 30   Supplies needed? No supplies needed   Refill activity completed? Yes   Refill activity plan Refill scheduled   Shipment/Pickup Date: 12/13/22            Current Outpatient Medications   Medication Sig    amlodipine-valsartan (EXFORGE)  mg per tablet Take 1 tablet by mouth once daily.    aspirin 325 MG tablet     atorvastatin (LIPITOR) 10 MG tablet     cholecalciferol, vitamin D3, 1,250 mcg (50,000 unit) capsule TAKE 1 CAPSULE BY MOUTH ONCE A WEEK FOR VIT. D DEFICIENCY    clonazePAM (KLONOPIN) 1 MG tablet TAKE 1 TABLET BY MOUTH ONCE  DAILY AT BEDTIME AS NEEDED FOR ANXIETY SLEEP    cyclobenzaprine (FLEXERIL) 10 MG tablet Take 1 tablet (10 mg total) by mouth 3 (three) times daily as needed for Muscle spasms.    DULoxetine (CYMBALTA) 20 MG capsule Take 20 mg by mouth once daily.    FEROSUL 325 mg (65 mg iron) Tab tablet TAKE 2 TABLETS BY MOUTH DAILY FOR IRON DEFICIENCY    LIDOcaine (LIDODERM) 5 % 1 patch once daily.    mycophenolate (CELLCEPT) 500 mg Tab Take 2 tablets (1,000 mg total) by mouth 2 (two) times daily.    nebivoloL (BYSTOLIC) 10 MG Tab Take 10 mg by mouth once daily.    ondansetron (ZOFRAN) 4 MG tablet Take 1 tablet (4 mg total) by mouth every 8 (eight) hours as needed for Nausea.    potassium chloride SA (K-DUR,KLOR-CON) 20 MEQ tablet Take 1 tablet (20 mEq total) by mouth 2 (two) times daily.    predniSONE (DELTASONE) 10 MG tablet Take 1 tablet (10 mg total) by mouth once daily.    sertraline (ZOLOFT) 50 MG tablet Take 100 mg by mouth once daily.    temazepam (RESTORIL) 15 mg Cap 30 mg.   Last reviewed on 12/1/2022  8:18 AM by Medina Givens RN    Review of patient's allergies indicates:   Allergen Reactions    Oranges [orange] Hives    Last reviewed on  12/1/2022 8:25 AM by Medina Givens      Tasks added this encounter   No tasks added.   Tasks due within next 3 months   12/8/2022 - Refill Call (Auto Added)     Jolly Mckenzie [facility]  [address] [place] [id number]  Phone: [phone]  Fax: [phone]

## 2022-12-12 ENCOUNTER — PATIENT MESSAGE (OUTPATIENT)
Dept: RHEUMATOLOGY | Facility: CLINIC | Age: 47
End: 2022-12-12
Payer: MEDICARE

## 2022-12-14 ENCOUNTER — PATIENT MESSAGE (OUTPATIENT)
Dept: RHEUMATOLOGY | Facility: CLINIC | Age: 47
End: 2022-12-14

## 2022-12-14 ENCOUNTER — OFFICE VISIT (OUTPATIENT)
Dept: RHEUMATOLOGY | Facility: CLINIC | Age: 47
End: 2022-12-14
Payer: MEDICARE

## 2022-12-14 DIAGNOSIS — Z09 CHEMOTHERAPY FOLLOW-UP EXAMINATION: ICD-10-CM

## 2022-12-14 DIAGNOSIS — U09.9 POST-COVID CHRONIC COUGH: Primary | ICD-10-CM

## 2022-12-14 DIAGNOSIS — M31.7 MICROSCOPIC POLYANGIITIS: ICD-10-CM

## 2022-12-14 DIAGNOSIS — M06.09 RHEUMATOID ARTHRITIS OF MULTIPLE SITES WITH NEGATIVE RHEUMATOID FACTOR: ICD-10-CM

## 2022-12-14 DIAGNOSIS — D84.9 IMMUNOCOMPROMISED: ICD-10-CM

## 2022-12-14 DIAGNOSIS — D68.61 ANTIPHOSPHOLIPID ANTIBODY WITH HYPERCOAGULABLE STATE: ICD-10-CM

## 2022-12-14 DIAGNOSIS — R05.3 POST-COVID CHRONIC COUGH: Primary | ICD-10-CM

## 2022-12-14 DIAGNOSIS — Z51.81 ENCOUNTER FOR MEDICATION MONITORING: ICD-10-CM

## 2022-12-14 PROCEDURE — 1160F RVW MEDS BY RX/DR IN RCRD: CPT | Mod: CPTII,95,, | Performed by: INTERNAL MEDICINE

## 2022-12-14 PROCEDURE — 1159F PR MEDICATION LIST DOCUMENTED IN MEDICAL RECORD: ICD-10-PCS | Mod: CPTII,95,, | Performed by: INTERNAL MEDICINE

## 2022-12-14 PROCEDURE — 99214 OFFICE O/P EST MOD 30 MIN: CPT | Mod: 95,,, | Performed by: INTERNAL MEDICINE

## 2022-12-14 PROCEDURE — 4010F PR ACE/ARB THEARPY RXD/TAKEN: ICD-10-PCS | Mod: CPTII,95,, | Performed by: INTERNAL MEDICINE

## 2022-12-14 PROCEDURE — 4010F ACE/ARB THERAPY RXD/TAKEN: CPT | Mod: CPTII,95,, | Performed by: INTERNAL MEDICINE

## 2022-12-14 PROCEDURE — 1160F PR REVIEW ALL MEDS BY PRESCRIBER/CLIN PHARMACIST DOCUMENTED: ICD-10-PCS | Mod: CPTII,95,, | Performed by: INTERNAL MEDICINE

## 2022-12-14 PROCEDURE — 99214 PR OFFICE/OUTPT VISIT, EST, LEVL IV, 30-39 MIN: ICD-10-PCS | Mod: 95,,, | Performed by: INTERNAL MEDICINE

## 2022-12-14 PROCEDURE — 1159F MED LIST DOCD IN RCRD: CPT | Mod: CPTII,95,, | Performed by: INTERNAL MEDICINE

## 2022-12-14 RX ORDER — PROMETHAZINE HYDROCHLORIDE AND DEXTROMETHORPHAN HYDROBROMIDE 6.25; 15 MG/5ML; MG/5ML
5 SYRUP ORAL EVERY 4 HOURS PRN
Qty: 240 ML | Refills: 0 | Status: SHIPPED | OUTPATIENT
Start: 2022-12-14 | End: 2022-12-24

## 2022-12-14 RX ORDER — METHYLPREDNISOLONE 4 MG/1
TABLET ORAL
Qty: 21 TABLET | Refills: 0 | Status: SHIPPED | OUTPATIENT
Start: 2022-12-14 | End: 2023-10-19 | Stop reason: ALTCHOICE

## 2022-12-14 RX ORDER — PREDNISONE 5 MG/1
5 TABLET ORAL DAILY
Qty: 90 TABLET | Refills: 0 | Status: SHIPPED | OUTPATIENT
Start: 2022-12-14 | End: 2023-03-30

## 2022-12-14 NOTE — PROGRESS NOTES
RHEUMATOLOGY FOLLOW UP - TELE VISIT     The patient location is: [place]  The chief complaint leading to consultation is:  Follow-up for arthritis and vasculitis.  Worsening cough since COVID.  Visit type: Virtual visit with synchronous audio and video  Total time spent with patient: 15 minutes  Each patient to whom he or she provides medical services by telemedicine is:  (1) informed of the relationship between the physician and patient and the respective role of any other health care provider with respect to management of the patient; and (2) notified that he or she may decline to receive medical services by telemedicine and may withdraw from such care at any time.    Chief complaints, HPI, ROS, EXAM, Assessment & Plans:-  Denise JoLockportelaine a 47 y.o. 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  She completed Cytoxan induction therapy for 6 months.  Tolerating Actemra well.  On CellCept and Actemra for active disease.  She denies any flare of rheumatoid arthritis.  Had COVID 10 days ago with fever, cough and sputum production.  No dyspnea.  No chest pain.  Fever subsided a week ago.  Still having persistent cough.  Home test returning positive for COVID.  Promethazine help with the cough until she ran out of the medication.  MNo neuropathy, abdominal ischemia, skin rash, ulcers.   Rheumatological review of system negative otherwise. .  100% fist formation bilateral hands.  No evidence of synovitis.  1. Post-COVID chronic cough    2. Microscopic polyangiitis    3. Rheumatoid arthritis of multiple sites with negative rheumatoid factor    4. Antiphospholipid antibody with hypercoagulable state    5. Immunocompromised    6. Encounter for medication monitoring    7. Chemotherapy follow-up examination      Problem List Items Addressed This Visit       Rheumatoid arthritis of  multiple sites with negative rheumatoid factor    Relevant Orders    CBC Auto Differential    Comprehensive Metabolic Panel    C-Reactive Protein    Sedimentation rate    Microscopic polyangiitis    Relevant Medications    predniSONE (DELTASONE) 5 MG tablet    Other Relevant Orders    CBC Auto Differential    Comprehensive Metabolic Panel    C-Reactive Protein    Sedimentation rate    Immunocompromised    Relevant Orders    CBC Auto Differential    Comprehensive Metabolic Panel    C-Reactive Protein    Sedimentation rate    Antiphospholipid antibody with hypercoagulable state    Relevant Orders    CBC Auto Differential    Comprehensive Metabolic Panel    C-Reactive Protein    Sedimentation rate     Other Visit Diagnoses       Post-COVID chronic cough    -  Primary    Relevant Medications    promethazine-dextromethorphan (PROMETHAZINE-DM) 6.25-15 mg/5 mL Syrp    methylPREDNISolone (MEDROL DOSEPACK) 4 mg tablet    Encounter for medication monitoring        Relevant Orders    CBC Auto Differential    Comprehensive Metabolic Panel    C-Reactive Protein    Sedimentation rate    Chemotherapy follow-up examination        Relevant Orders    CBC Auto Differential    Comprehensive Metabolic Panel    C-Reactive Protein    Sedimentation rate            Promethazine and Medrol Dosepak for post viral bronchitis and cough.  No active infection at this time.  Continue Actemra every 4 weeks.  No adverse effects from Cytoxan chemotherapy.  CBC, CMP, ESR and CRP ordered.  Labs with every infusion.  Hold Actemra and CellCept if any infection.  Reduce prednisone to 5 mg daily.  Compromised immune system secondary to autoimmune disease and use of immunosuppressive drugs. Monitor carefully for infections. Advised to get immediate medical care if any infection. Also advised strict adherence to age appropriate vaccinations and cancer screenings with PCP.   # Follow up in about 3 months (around 3/14/2023).      Past Medical History:    Diagnosis Date    Hypertension     Stroke            Past Surgical History:   Procedure Laterality Date     SECTION      TUBAL LIGATION          Social History     Tobacco Use    Smoking status: Never    Smokeless tobacco: Never   Substance Use Topics    Alcohol use: Not Currently       Family History   Problem Relation Age of Onset    Hypertension Mother     Cancer Father         osiel       Review of patient's allergies indicates:   Allergen Reactions    Oranges [orange] Hives       Medication List with Changes/Refills   New Medications    METHYLPREDNISOLONE (MEDROL DOSEPACK) 4 MG TABLET    use as directed    PROMETHAZINE-DEXTROMETHORPHAN (PROMETHAZINE-DM) 6.25-15 MG/5 ML SYRP    Take 5 mLs by mouth every 4 (four) hours as needed (cough).   Current Medications    AMLODIPINE-VALSARTAN (EXFORGE)  MG PER TABLET    Take 1 tablet by mouth once daily.    ASPIRIN 325 MG TABLET        ATORVASTATIN (LIPITOR) 10 MG TABLET        CHOLECALCIFEROL, VITAMIN D3, 1,250 MCG (50,000 UNIT) CAPSULE    TAKE 1 CAPSULE BY MOUTH ONCE A WEEK FOR VIT. D DEFICIENCY    CLONAZEPAM (KLONOPIN) 1 MG TABLET    TAKE 1 TABLET BY MOUTH ONCE DAILY AT BEDTIME AS NEEDED FOR ANXIETY SLEEP    CYCLOBENZAPRINE (FLEXERIL) 10 MG TABLET    Take 1 tablet (10 mg total) by mouth 3 (three) times daily as needed for Muscle spasms.    DULOXETINE (CYMBALTA) 20 MG CAPSULE    Take 20 mg by mouth once daily.    FEROSUL 325 MG (65 MG IRON) TAB TABLET    TAKE 2 TABLETS BY MOUTH DAILY FOR IRON DEFICIENCY    LIDOCAINE (LIDODERM) 5 %    1 patch once daily.    MYCOPHENOLATE (CELLCEPT) 500 MG TAB    Take 2 tablets (1,000 mg total) by mouth 2 (two) times daily.    NEBIVOLOL (BYSTOLIC) 10 MG TAB    Take 10 mg by mouth once daily.    ONDANSETRON (ZOFRAN) 4 MG TABLET    Take 1 tablet (4 mg total) by mouth every 8 (eight) hours as needed for Nausea.    POTASSIUM CHLORIDE SA (K-DUR,KLOR-CON) 20 MEQ TABLET    Take 1 tablet (20 mEq total) by mouth 2 (two)  times daily.    SERTRALINE (ZOLOFT) 50 MG TABLET    Take 100 mg by mouth once daily.    TEMAZEPAM (RESTORIL) 15 MG CAP    30 mg.   Changed and/or Refilled Medications    Modified Medication Previous Medication    PREDNISONE (DELTASONE) 5 MG TABLET predniSONE (DELTASONE) 10 MG tablet       Take 1 tablet (5 mg total) by mouth once daily.    Take 1 tablet (10 mg total) by mouth once daily.       Disclaimer: This note was prepared using voice recognition system and is likely to have sound alike errors and is not proof read.  Please call me with any questions.

## 2022-12-29 ENCOUNTER — INFUSION (OUTPATIENT)
Dept: INFUSION THERAPY | Facility: HOSPITAL | Age: 47
End: 2022-12-29
Attending: INTERNAL MEDICINE
Payer: MEDICARE

## 2022-12-29 VITALS
SYSTOLIC BLOOD PRESSURE: 125 MMHG | HEART RATE: 74 BPM | RESPIRATION RATE: 16 BRPM | TEMPERATURE: 98 F | BODY MASS INDEX: 42.36 KG/M2 | HEIGHT: 62 IN | OXYGEN SATURATION: 98 % | DIASTOLIC BLOOD PRESSURE: 87 MMHG | WEIGHT: 230.19 LBS

## 2022-12-29 DIAGNOSIS — M06.09 RHEUMATOID ARTHRITIS OF MULTIPLE SITES WITH NEGATIVE RHEUMATOID FACTOR: Primary | ICD-10-CM

## 2022-12-29 PROCEDURE — 25000003 PHARM REV CODE 250: Performed by: STUDENT IN AN ORGANIZED HEALTH CARE EDUCATION/TRAINING PROGRAM

## 2022-12-29 PROCEDURE — 96365 THER/PROPH/DIAG IV INF INIT: CPT

## 2022-12-29 PROCEDURE — 63600175 PHARM REV CODE 636 W HCPCS: Mod: JG | Performed by: STUDENT IN AN ORGANIZED HEALTH CARE EDUCATION/TRAINING PROGRAM

## 2022-12-29 RX ORDER — HEPARIN 100 UNIT/ML
500 SYRINGE INTRAVENOUS
Status: CANCELLED | OUTPATIENT
Start: 2023-01-26

## 2022-12-29 RX ORDER — SODIUM CHLORIDE 0.9 % (FLUSH) 0.9 %
10 SYRINGE (ML) INJECTION
Status: CANCELLED | OUTPATIENT
Start: 2023-01-26

## 2022-12-29 RX ADMIN — TOCILIZUMAB 800 MG: 20 INJECTION, SOLUTION, CONCENTRATE INTRAVENOUS at 09:12

## 2022-12-29 NOTE — DISCHARGE INSTRUCTIONS
.THANKS FOR ALLOWING ME TO CARE FOR YOU!!!! ~Tia          THANKS FOR CHOOSING [facility]!!!        [facility]  [address] [facility].  or  [id number] [place] [address] Drive  [phone] phone     [phone] fax  Hours of Operation: Monday- Friday 8:00am- 5:00pm  After hours phone  [phone]  Hematology / Oncology Physicians on call      Dr. Rashel Chopra, NICOLE Sales, NICOLE      Please call with any concerns regarding your appointment today.      .FALL PREVENTION   Falls often occur due to slipping, tripping or losing your balance. Here are ways to reduce your risk of falling again.   Was there anything that caused your fall that can be fixed, removed or replaced?   Make your home safe by keeping walkways clear of objects you may trip over.   Use non-slip pads under rugs.   Do not walk in poorly lit areas.   Do not stand on chairs or wobbly ladders.   Use caution when reaching overhead or looking upward. This position can cause a loss of balance.   Be sure your shoes fit properly, have non-slip bottoms and are in good condition.   Be cautious when going up and down stairs, curbs, and when walking on uneven sidewalks.   If your balance is poor, consider using a cane or walker.   If your fall was related to alcohol use, stop or limit alcohol intake.   If your fall was related to use of sleeping medicines, talk to your doctor about this. You may need to reduce your dosage at bedtime if you awaken during the night to go to the bathroom.   To reduce the need for nighttime bathroom trips:   Avoid drinking fluids for several hours before going to bed   Empty your bladder before going to bed   Men can keep a urinal at the bedside   © 5842-4447 [employer] \[facility]\"", [address] [place] [address], [place] [address]. All rights reserved. This information is not intended as a substitute for professional medical care. Always follow your healthcare professional's  instructions.

## 2022-12-29 NOTE — PLAN OF CARE
Problem: Adult Inpatient Plan of Care  Goal: Plan of Care Review  Outcome: Ongoing, Progressing  Flowsheets (Taken 12/29/2022 1535)  Plan of Care Reviewed With: patient  Goal: Patient-Specific Goal (Individualized)  Outcome: Ongoing, Progressing  Flowsheets (Taken 12/29/2022 1535)  Individualized Care Needs: Feet elevated, 2 blankets, pillow  Patient-Specific Goals (Include Timeframe): Patient will tolerate actemra without events.  Goal: Absence of Hospital-Acquired Illness or Injury  Outcome: Ongoing, Progressing  Goal: Optimal Comfort and Wellbeing  Outcome: Ongoing, Progressing  Intervention: Provide Person-Centered Care  Flowsheets (Taken 12/29/2022 1535)  Trust Relationship/Rapport:   care explained   choices provided   emotional support provided   empathic listening provided   thoughts/feelings acknowledged   reassurance provided   questions encouraged   questions answered     Problem: Infection  Goal: Absence of Infection Signs and Symptoms  Outcome: Ongoing, Progressing  Intervention: Prevent or Manage Infection  Flowsheets (Taken 12/29/2022 1535)  Infection Management: aseptic technique maintained

## 2022-12-29 NOTE — NURSING
Infusion Actemra  800 mg q 4 Weeks  Last dose- 12/01/22    Any:  -recent illness, infection, or antibiotic use in past week- Denies  -open wounds or mouth sores- Denies  -invasive procedures or surgeries in past 4 weeks or in upcoming 4 weeks- Denies  -vaccinations in past week- Denies  -any new symptoms/change in symptoms-Denies  -chance you may be pregnant- Denies      Recent labs? 12/29/22  Last Rheumatology provider visit- Seen by Ruperto Noguera MD on 11/10/22     Premeds-None     Actemra 800 mg administered IV at a 60 minute rate per orders; see MAR and vitals for more details. Tolerated well without adverse events, discharged and ambulatory out of clinic.

## 2023-01-12 ENCOUNTER — PATIENT MESSAGE (OUTPATIENT)
Dept: PHARMACY | Facility: CLINIC | Age: 48
End: 2023-01-12
Payer: MEDICARE

## 2023-01-17 ENCOUNTER — SPECIALTY PHARMACY (OUTPATIENT)
Dept: PHARMACY | Facility: CLINIC | Age: 48
End: 2023-01-17
Payer: MEDICARE

## 2023-01-17 NOTE — TELEPHONE ENCOUNTER
Spoke to patient. She said she is currently not at home and requests a callback later this week. She states she still has some medication on hand.

## 2023-01-19 NOTE — TELEPHONE ENCOUNTER
Specialty Pharmacy - Refill Coordination    Specialty Medication Orders Linked to Encounter      Flowsheet Row Most Recent Value   Medication #1 mycophenolate (CELLCEPT) 500 mg Tab (Order#[id number], Rx#[id number])            Refill Questions - Documented Responses      Flowsheet Row Most Recent Value   Patient Availability and HIPAA Verification    Does patient want to proceed with activity? Yes   HIPAA/medical authority confirmed? Yes   Relationship to patient of person spoken to? Self   Refill Screening Questions    Changes to allergies? No   Changes to medications? No   New conditions since last clinic visit? No   Unplanned office visit, urgent care, ED, or hospital admission in the last 4 weeks? No   How does patient/caregiver feel medication is working? Good   Financial problems or insurance changes? No   How many doses of your specialty medications were missed in the last 4 weeks? 0   Would patient like to speak to a pharmacist? No   When does the patient need to receive the medication? 01/20/23   Refill Delivery Questions    How will the patient receive the medication? MEDRx   When does the patient need to receive the medication? 01/20/23   Shipping Address Home   Address in [facility] confirmed and updated if neccessary? Yes   Expected Copay ($) 0   Is the patient able to afford the medication copay? Yes   Payment Method zero copay   Days supply of Refill 30   Supplies needed? No supplies needed   Refill activity completed? Yes   Refill activity plan Refill scheduled   Shipment/Pickup Date: 01/19/23            Current Outpatient Medications   Medication Sig    amlodipine-valsartan (EXFORGE)  mg per tablet Take 1 tablet by mouth once daily.    aspirin 325 MG tablet     atorvastatin (LIPITOR) 10 MG tablet     cholecalciferol, vitamin D3, 1,250 mcg (50,000 unit) capsule TAKE 1 CAPSULE BY MOUTH ONCE A WEEK FOR VIT. D DEFICIENCY    clonazePAM (KLONOPIN) 1 MG tablet TAKE 1 TABLET BY MOUTH ONCE DAILY  AT BEDTIME AS NEEDED FOR ANXIETY SLEEP    cyclobenzaprine (FLEXERIL) 10 MG tablet Take 1 tablet (10 mg total) by mouth 3 (three) times daily as needed for Muscle spasms.    DULoxetine (CYMBALTA) 20 MG capsule Take 20 mg by mouth once daily.    FEROSUL 325 mg (65 mg iron) Tab tablet TAKE 2 TABLETS BY MOUTH DAILY FOR IRON DEFICIENCY    LIDOcaine (LIDODERM) 5 % 1 patch once daily.    methylPREDNISolone (MEDROL DOSEPACK) 4 mg tablet use as directed    mycophenolate (CELLCEPT) 500 mg Tab Take 2 tablets (1,000 mg total) by mouth 2 (two) times daily.    nebivoloL (BYSTOLIC) 10 MG Tab Take 10 mg by mouth once daily.    ondansetron (ZOFRAN) 4 MG tablet Take 1 tablet (4 mg total) by mouth every 8 (eight) hours as needed for Nausea.    potassium chloride SA (K-DUR,KLOR-CON) 20 MEQ tablet Take 1 tablet (20 mEq total) by mouth 2 (two) times daily.    predniSONE (DELTASONE) 5 MG tablet Take 1 tablet (5 mg total) by mouth once daily.    sertraline (ZOLOFT) 50 MG tablet Take 100 mg by mouth once daily.    temazepam (RESTORIL) 15 mg Cap 30 mg.   Last reviewed on 12/29/2022  3:31 PM by Tia Alvarenga RN    Review of patient's allergies indicates:   Allergen Reactions    Oranges [orange] Hives    Last reviewed on  12/29/2022 9:08 AM by Tia Alvarenga      Tasks added this encounter   2/12/2023 - Refill Call (Auto Added)   Tasks due within next 3 months   No tasks due.     Ciara Manley, Patient Care Assistant  [facility]  [address] [place], [place] [address]ie  [facility] [address]  Phone: [phone]  Fax: [phone]

## 2023-01-26 ENCOUNTER — INFUSION (OUTPATIENT)
Dept: INFUSION THERAPY | Facility: HOSPITAL | Age: 48
End: 2023-01-26
Attending: INTERNAL MEDICINE
Payer: MEDICARE

## 2023-01-26 VITALS
OXYGEN SATURATION: 97 % | BODY MASS INDEX: 42.54 KG/M2 | HEART RATE: 61 BPM | SYSTOLIC BLOOD PRESSURE: 124 MMHG | WEIGHT: 232.56 LBS | TEMPERATURE: 98 F | RESPIRATION RATE: 18 BRPM | DIASTOLIC BLOOD PRESSURE: 76 MMHG

## 2023-01-26 DIAGNOSIS — M06.09 RHEUMATOID ARTHRITIS OF MULTIPLE SITES WITH NEGATIVE RHEUMATOID FACTOR: Primary | ICD-10-CM

## 2023-01-26 PROCEDURE — 63600175 PHARM REV CODE 636 W HCPCS: Mod: JG | Performed by: STUDENT IN AN ORGANIZED HEALTH CARE EDUCATION/TRAINING PROGRAM

## 2023-01-26 PROCEDURE — 25000003 PHARM REV CODE 250: Performed by: STUDENT IN AN ORGANIZED HEALTH CARE EDUCATION/TRAINING PROGRAM

## 2023-01-26 PROCEDURE — 96365 THER/PROPH/DIAG IV INF INIT: CPT

## 2023-01-26 RX ORDER — SODIUM CHLORIDE 0.9 % (FLUSH) 0.9 %
10 SYRINGE (ML) INJECTION
Status: CANCELLED | OUTPATIENT
Start: 2023-02-23

## 2023-01-26 RX ORDER — HEPARIN 100 UNIT/ML
500 SYRINGE INTRAVENOUS
Status: CANCELLED | OUTPATIENT
Start: 2023-02-23

## 2023-01-26 RX ADMIN — TOCILIZUMAB 800 MG: 20 INJECTION, SOLUTION, CONCENTRATE INTRAVENOUS at 09:01

## 2023-01-26 NOTE — PLAN OF CARE
Discussed plan of care with pt. Addressed any and ongoing concerns. Pt denies    Problem: Adult Inpatient Plan of Care  Goal: Plan of Care Review  Outcome: Ongoing, Progressing  Flowsheets (Taken 1/26/2023 1057)  Plan of Care Reviewed With: patient  Goal: Patient-Specific Goal (Individualized)  Outcome: Ongoing, Progressing  Goal: Absence of Hospital-Acquired Illness or Injury  Outcome: Ongoing, Progressing  Intervention: Identify and Manage Fall Risk  Flowsheets (Taken 1/26/2023 1057)  Safety Promotion/Fall Prevention:   in recliner, wheels locked   room near unit station  Intervention: Prevent Infection  Flowsheets (Taken 1/26/2023 1057)  Infection Prevention:   hand hygiene promoted   equipment surfaces disinfected  Goal: Optimal Comfort and Wellbeing  Outcome: Ongoing, Progressing  Intervention: Monitor Pain and Promote Comfort  Flowsheets (Taken 1/26/2023 1057)  Pain Management Interventions:   quiet environment facilitated   warm blanket provided  Intervention: Provide Person-Centered Care  Flowsheets (Taken 1/26/2023 1057)  Trust Relationship/Rapport:   care explained   thoughts/feelings acknowledged   reassurance provided   choices provided   emotional support provided   empathic listening provided   questions answered   questions encouraged

## 2023-02-13 ENCOUNTER — SPECIALTY PHARMACY (OUTPATIENT)
Dept: PHARMACY | Facility: CLINIC | Age: 48
End: 2023-02-13
Payer: MEDICARE

## 2023-02-13 NOTE — TELEPHONE ENCOUNTER
Specialty Pharmacy - Refill Coordination    Specialty Medication Orders Linked to Encounter      Flowsheet Row Most Recent Value   Medication #1 mycophenolate (CELLCEPT) 500 mg Tab (Order#[id number], Rx#[id number])            Refill Questions - Documented Responses      Flowsheet Row Most Recent Value   Patient Availability and HIPAA Verification    Does patient want to proceed with activity? Yes   HIPAA/medical authority confirmed? Yes   Relationship to patient of person spoken to? Self   Refill Screening Questions    Changes to allergies? No   Changes to medications? No   New conditions since last clinic visit? No   Unplanned office visit, urgent care, ED, or hospital admission in the last 4 weeks? No   How does patient/caregiver feel medication is working? Good   Financial problems or insurance changes? No   How many doses of your specialty medications were missed in the last 4 weeks? 0   Would patient like to speak to a pharmacist? No   When does the patient need to receive the medication? 02/19/23   Refill Delivery Questions    How will the patient receive the medication? Mail   When does the patient need to receive the medication? 02/19/23   Shipping Address Home   Address in [facility] confirmed and updated if neccessary? Yes   Expected Copay ($) 0   Is the patient able to afford the medication copay? Yes   Payment Method zero copay   Days supply of Refill 30   Supplies needed? No supplies needed   Refill activity completed? Yes   Refill activity plan Refill scheduled   Shipment/Pickup Date: 02/15/23            Current Outpatient Medications   Medication Sig    amlodipine-valsartan (EXFORGE)  mg per tablet Take 1 tablet by mouth once daily.    aspirin 325 MG tablet     atorvastatin (LIPITOR) 10 MG tablet     cholecalciferol, vitamin D3, 1,250 mcg (50,000 unit) capsule TAKE 1 CAPSULE BY MOUTH ONCE A WEEK FOR VIT. D DEFICIENCY    clonazePAM (KLONOPIN) 1 MG tablet TAKE 1 TABLET BY MOUTH ONCE DAILY  AT BEDTIME AS NEEDED FOR ANXIETY SLEEP    cyclobenzaprine (FLEXERIL) 10 MG tablet Take 1 tablet (10 mg total) by mouth 3 (three) times daily as needed for Muscle spasms.    DULoxetine (CYMBALTA) 20 MG capsule Take 20 mg by mouth once daily.    FEROSUL 325 mg (65 mg iron) Tab tablet TAKE 2 TABLETS BY MOUTH DAILY FOR IRON DEFICIENCY    LIDOcaine (LIDODERM) 5 % 1 patch once daily.    methylPREDNISolone (MEDROL DOSEPACK) 4 mg tablet use as directed    mycophenolate (CELLCEPT) 500 mg Tab Take 2 tablets (1,000 mg total) by mouth 2 (two) times daily.    nebivoloL (BYSTOLIC) 10 MG Tab Take 10 mg by mouth once daily.    ondansetron (ZOFRAN) 4 MG tablet Take 1 tablet (4 mg total) by mouth every 8 (eight) hours as needed for Nausea.    potassium chloride SA (K-DUR,KLOR-CON) 20 MEQ tablet Take 1 tablet (20 mEq total) by mouth 2 (two) times daily.    predniSONE (DELTASONE) 10 MG tablet TAKE 1 TABLET ONE TIME DAILY    predniSONE (DELTASONE) 5 MG tablet Take 1 tablet (5 mg total) by mouth once daily.    sertraline (ZOLOFT) 50 MG tablet Take 100 mg by mouth once daily.    temazepam (RESTORIL) 15 mg Cap 30 mg.   Last reviewed on 1/26/2023  8:36 AM by Medina Givens RN    Review of patient's allergies indicates:   Allergen Reactions    Oranges [orange] Hives    Last reviewed on  1/26/2023 9:09 AM by Medina Givens      Tasks added this encounter   3/14/2023 - Refill Call (Auto Added)   Tasks due within next 3 months   No tasks due.     Lorraine Brooks  [facility]  [address] [place] [id number]  Phone: [phone]  Fax: [phone]

## 2023-02-23 ENCOUNTER — INFUSION (OUTPATIENT)
Dept: INFUSION THERAPY | Facility: HOSPITAL | Age: 48
End: 2023-02-23
Attending: INTERNAL MEDICINE
Payer: MEDICARE

## 2023-02-23 VITALS
HEART RATE: 67 BPM | TEMPERATURE: 97 F | WEIGHT: 238.13 LBS | HEIGHT: 62 IN | SYSTOLIC BLOOD PRESSURE: 136 MMHG | OXYGEN SATURATION: 97 % | RESPIRATION RATE: 18 BRPM | DIASTOLIC BLOOD PRESSURE: 81 MMHG | BODY MASS INDEX: 43.82 KG/M2

## 2023-02-23 DIAGNOSIS — M06.09 RHEUMATOID ARTHRITIS OF MULTIPLE SITES WITH NEGATIVE RHEUMATOID FACTOR: Primary | ICD-10-CM

## 2023-02-23 PROCEDURE — 63600175 PHARM REV CODE 636 W HCPCS: Mod: JG | Performed by: STUDENT IN AN ORGANIZED HEALTH CARE EDUCATION/TRAINING PROGRAM

## 2023-02-23 PROCEDURE — 25000003 PHARM REV CODE 250: Performed by: STUDENT IN AN ORGANIZED HEALTH CARE EDUCATION/TRAINING PROGRAM

## 2023-02-23 PROCEDURE — 96365 THER/PROPH/DIAG IV INF INIT: CPT

## 2023-02-23 PROCEDURE — 25000003 PHARM REV CODE 250: Performed by: INTERNAL MEDICINE

## 2023-02-23 RX ORDER — SODIUM CHLORIDE 0.9 % (FLUSH) 0.9 %
10 SYRINGE (ML) INJECTION
Status: CANCELLED | OUTPATIENT
Start: 2023-03-23

## 2023-02-23 RX ORDER — HEPARIN 100 UNIT/ML
500 SYRINGE INTRAVENOUS
Status: CANCELLED | OUTPATIENT
Start: 2023-03-23

## 2023-02-23 RX ADMIN — SODIUM CHLORIDE: 9 INJECTION, SOLUTION INTRAVENOUS at 09:02

## 2023-02-23 RX ADMIN — TOCILIZUMAB 800 MG: 20 INJECTION, SOLUTION, CONCENTRATE INTRAVENOUS at 09:02

## 2023-02-23 NOTE — PLAN OF CARE
Patient reports feeling tired and having a lot of pain today. Tolerated actemra infusion well with no adverse reactions. Patient to return to clinic in 4 weeks for next infusion.

## 2023-02-23 NOTE — DISCHARGE INSTRUCTIONS
.[facility]  [address] [facility]  [address]  [phone] phone     [phone] fax  Hours of Operation: Monday- Friday 8:00am- 5:00pm  After hours phone  [phone]  Hematology / Oncology Physicians on call    Dr. Rashel Trivedi      Nurse Practitioners:    Tammy Chopra, NP  Tonja Acharya, NICOLE Daniels, NICOLE Dobbins, NICOLE Negrete, NICOLE Javed, PA      Please don't hesitate to call if you have any concerns.      .WAYS TO HELP PREVENT INFECTION        WASH YOUR HANDS OFTEN DURING THE DAY, ESPECIALLY BEFORE YOU EAT, AFTER USING THE BATHROOM, AND AFTER TOUCHING ANIMALS    STAY AWAY FROM PEOPLE WHO HAVE ILLNESSES YOU CAN CATCH; SUCH AS COLDS, FLU, CHICKEN POX    TRY TO AVOID CROWDS    STAY AWAY FROM CHILDREN WHO RECENTLY HAVE RECEIVED LIVE VIRUS VACCINES    MAINTAIN GOOD MOUTH CARE    DO NOT SQUEEZE OR SCRATCH PIMPLES    CLEAN CUTS & SCRAPES RIGHT AWAY AND DAILY UNTIL HEALED WITH WARM WATER, SOAP & AN ANTISEPTIC    AVOID CONTACT WITH LITTER BOXES, BIRD CAGES, & FISH TANKS    AVOID STANDING WATER, IE., BIRD BATHS, FLOWER POTS/VASES, OR HUMIDIFIERS    WEAR GLOVES WHEN GARDENING OR CLEANING UP AFTER OTHERS, ESPECIALLY BABIES & SMALL CHILDREN    DO NOT EAT RAW FISH, SEAFOOD, MEAT, OR EGGS     .FALL PREVENTION   Falls often occur due to slipping, tripping or losing your balance. Here are ways to reduce your risk of falling again.   Was there anything that caused your fall that can be fixed, removed or replaced?   Make your home safe by keeping walkways clear of objects you may trip over.   Use non-slip pads under rugs.   Do not walk in poorly lit areas.   Do not stand on chairs or wobbly ladders.   Use caution when reaching overhead or looking upward. This position can cause a loss of balance.   Be sure your shoes fit properly, have non-slip bottoms and are in good condition.   Be cautious when  going up and down stairs, curbs, and when walking on uneven sidewalks.   If your balance is poor, consider using a cane or walker.   If your fall was related to alcohol use, stop or limit alcohol intake.   If your fall was related to use of sleeping medicines, talk to your doctor about this. You may need to reduce your dosage at bedtime if you awaken during the night to go to the bathroom.   To reduce the need for nighttime bathroom trips:   Avoid drinking fluids for several hours before going to bed   Empty your bladder before going to bed   Men can keep a urinal at the bedside   © 8766-7945 [facility], [address] [place] [address], [place] [address]. All rights reserved. This information is not intended as a substitute for professional medical care. Always follow your healthcare professional's instructions.

## 2023-03-14 DIAGNOSIS — M06.09 RHEUMATOID ARTHRITIS OF MULTIPLE SITES WITH NEGATIVE RHEUMATOID FACTOR: ICD-10-CM

## 2023-03-14 DIAGNOSIS — M31.7 MICROSCOPIC POLYANGIITIS: ICD-10-CM

## 2023-03-14 RX ORDER — MYCOPHENOLATE MOFETIL 500 MG/1
1000 TABLET ORAL 2 TIMES DAILY
Qty: 120 TABLET | Refills: 3 | Status: SHIPPED | OUTPATIENT
Start: 2023-03-14 | End: 2023-07-17 | Stop reason: SDUPTHER

## 2023-03-23 ENCOUNTER — SPECIALTY PHARMACY (OUTPATIENT)
Dept: PHARMACY | Facility: CLINIC | Age: 48
End: 2023-03-23
Payer: MEDICARE

## 2023-03-23 ENCOUNTER — INFUSION (OUTPATIENT)
Dept: INFUSION THERAPY | Facility: HOSPITAL | Age: 48
End: 2023-03-23
Attending: INTERNAL MEDICINE
Payer: MEDICARE

## 2023-03-23 VITALS
DIASTOLIC BLOOD PRESSURE: 81 MMHG | RESPIRATION RATE: 18 BRPM | BODY MASS INDEX: 43.95 KG/M2 | OXYGEN SATURATION: 97 % | TEMPERATURE: 97 F | SYSTOLIC BLOOD PRESSURE: 127 MMHG | WEIGHT: 240.31 LBS | HEART RATE: 74 BPM

## 2023-03-23 DIAGNOSIS — M06.09 RHEUMATOID ARTHRITIS OF MULTIPLE SITES WITH NEGATIVE RHEUMATOID FACTOR: Primary | ICD-10-CM

## 2023-03-23 PROCEDURE — 25000003 PHARM REV CODE 250: Performed by: INTERNAL MEDICINE

## 2023-03-23 PROCEDURE — 25000003 PHARM REV CODE 250: Performed by: STUDENT IN AN ORGANIZED HEALTH CARE EDUCATION/TRAINING PROGRAM

## 2023-03-23 PROCEDURE — 63600175 PHARM REV CODE 636 W HCPCS: Mod: JZ,JG | Performed by: STUDENT IN AN ORGANIZED HEALTH CARE EDUCATION/TRAINING PROGRAM

## 2023-03-23 PROCEDURE — 96365 THER/PROPH/DIAG IV INF INIT: CPT

## 2023-03-23 RX ORDER — HEPARIN 100 UNIT/ML
500 SYRINGE INTRAVENOUS
Status: CANCELLED | OUTPATIENT
Start: 2023-04-20

## 2023-03-23 RX ORDER — SODIUM CHLORIDE 0.9 % (FLUSH) 0.9 %
10 SYRINGE (ML) INJECTION
Status: CANCELLED | OUTPATIENT
Start: 2023-04-20

## 2023-03-23 RX ADMIN — TOCILIZUMAB 800 MG: 20 INJECTION, SOLUTION, CONCENTRATE INTRAVENOUS at 09:03

## 2023-03-23 RX ADMIN — SODIUM CHLORIDE: 9 INJECTION, SOLUTION INTRAVENOUS at 09:03

## 2023-03-23 NOTE — TELEPHONE ENCOUNTER
Specialty Pharmacy - Refill Coordination    Specialty Medication Orders Linked to Encounter      Flowsheet Row Most Recent Value   Medication #1 mycophenolate (CELLCEPT) 500 mg Tab (Order#[id number], Rx#[id number])            Refill Questions - Documented Responses      Flowsheet Row Most Recent Value   Patient Availability and HIPAA Verification    Does patient want to proceed with activity? Yes   HIPAA/medical authority confirmed? Yes   Relationship to patient of person spoken to? Self   Refill Screening Questions    Changes to allergies? No   Changes to medications? No   New conditions since last clinic visit? No   Unplanned office visit, urgent care, ED, or hospital admission in the last 4 weeks? No   How does patient/caregiver feel medication is working? Good   Financial problems or insurance changes? No   How many doses of your specialty medications were missed in the last 4 weeks? 0   Would patient like to speak to a pharmacist? No   When does the patient need to receive the medication? 03/23/23   Refill Delivery Questions    How will the patient receive the medication? Mail   When does the patient need to receive the medication? 03/23/23   Shipping Address Home   Address in [facility] confirmed and updated if neccessary? Yes   Expected Copay ($) 0   Is the patient able to afford the medication copay? Yes   Payment Method zero copay   Days supply of Refill 30   Supplies needed? No supplies needed   Refill activity completed? Yes   Refill activity plan Refill scheduled   Shipment/Pickup Date: 03/23/23            Current Outpatient Medications   Medication Sig    amlodipine-valsartan (EXFORGE)  mg per tablet Take 1 tablet by mouth once daily.    aspirin 325 MG tablet     atorvastatin (LIPITOR) 10 MG tablet     cholecalciferol, vitamin D3, 1,250 mcg (50,000 unit) capsule TAKE 1 CAPSULE BY MOUTH ONCE A WEEK FOR VIT. D DEFICIENCY    clonazePAM (KLONOPIN) 1 MG tablet TAKE 1 TABLET BY MOUTH ONCE DAILY  AT BEDTIME AS NEEDED FOR ANXIETY SLEEP    cyclobenzaprine (FLEXERIL) 10 MG tablet Take 1 tablet (10 mg total) by mouth 3 (three) times daily as needed for Muscle spasms.    DULoxetine (CYMBALTA) 20 MG capsule Take 20 mg by mouth once daily.    FEROSUL 325 mg (65 mg iron) Tab tablet TAKE 2 TABLETS BY MOUTH DAILY FOR IRON DEFICIENCY    LIDOcaine (LIDODERM) 5 % 1 patch once daily.    methylPREDNISolone (MEDROL DOSEPACK) 4 mg tablet use as directed    mycophenolate (CELLCEPT) 500 mg Tab Take 2 tablets (1,000 mg total) by mouth 2 (two) times daily.    nebivoloL (BYSTOLIC) 10 MG Tab Take 10 mg by mouth once daily.    ondansetron (ZOFRAN) 4 MG tablet Take 1 tablet (4 mg total) by mouth every 8 (eight) hours as needed for Nausea.    potassium chloride SA (K-DUR,KLOR-CON) 20 MEQ tablet Take 1 tablet (20 mEq total) by mouth 2 (two) times daily.    predniSONE (DELTASONE) 10 MG tablet TAKE 1 TABLET ONE TIME DAILY    predniSONE (DELTASONE) 5 MG tablet Take 1 tablet (5 mg total) by mouth once daily.    sertraline (ZOLOFT) 50 MG tablet Take 100 mg by mouth once daily.    temazepam (RESTORIL) 15 mg Cap 30 mg.   Last reviewed on 3/23/2023  8:46 AM by Jose Crenshaw RN    Review of patient's allergies indicates:   Allergen Reactions    Oranges [orange] Hives    Last reviewed on  3/23/2023 8:46 AM by Jose Crenshaw      Tasks added this encounter   4/15/2023 - Refill Call (Auto Added)   Tasks due within next 3 months   No tasks due.     Leann Mckenzie [facility]  [address] [place] [id number]  Phone: [phone]  Fax: [phone]

## 2023-03-23 NOTE — DISCHARGE INSTRUCTIONS
..[facility]  [address] [facility]  [address]  [phone] phone     [phone] fax  Hours of Operation: Monday- Friday 8:00am- 5:00pm  After hours phone  [phone]  Hematology / Oncology Physicians on call    Dr. Rashel Trivedi      Nurse Practitioners:    Tammy Chopra, NICOLE Acharya, NICOLE Daniels, NICOLE Dobbins, NICOLE Negrete, YESSICA Toney      Please don't hesitate to call if you have any concerns.

## 2023-03-23 NOTE — PLAN OF CARE
Patient stated she was feeling tired and achy today. Patient tolerated infusion well with no adverse reactions. Patient to return to clinic on 04/20.

## 2023-03-28 ENCOUNTER — PATIENT MESSAGE (OUTPATIENT)
Dept: RESEARCH | Facility: HOSPITAL | Age: 48
End: 2023-03-28
Payer: MEDICARE

## 2023-03-30 ENCOUNTER — LAB VISIT (OUTPATIENT)
Dept: LAB | Facility: HOSPITAL | Age: 48
End: 2023-03-30
Attending: INTERNAL MEDICINE
Payer: MEDICARE

## 2023-03-30 ENCOUNTER — OFFICE VISIT (OUTPATIENT)
Dept: RHEUMATOLOGY | Facility: CLINIC | Age: 48
End: 2023-03-30
Payer: MEDICARE

## 2023-03-30 VITALS
BODY MASS INDEX: 44.26 KG/M2 | SYSTOLIC BLOOD PRESSURE: 151 MMHG | DIASTOLIC BLOOD PRESSURE: 104 MMHG | WEIGHT: 240.5 LBS | HEIGHT: 62 IN | HEART RATE: 88 BPM

## 2023-03-30 DIAGNOSIS — M31.7 MICROSCOPIC POLYANGIITIS: ICD-10-CM

## 2023-03-30 DIAGNOSIS — M31.7 MICROSCOPIC POLYANGIITIS: Primary | ICD-10-CM

## 2023-03-30 DIAGNOSIS — G89.4 CHRONIC PAIN SYNDROME: ICD-10-CM

## 2023-03-30 DIAGNOSIS — D84.9 IMMUNOCOMPROMISED: ICD-10-CM

## 2023-03-30 DIAGNOSIS — D68.61 ANTIPHOSPHOLIPID ANTIBODY WITH HYPERCOAGULABLE STATE: ICD-10-CM

## 2023-03-30 DIAGNOSIS — M06.09 RHEUMATOID ARTHRITIS OF MULTIPLE SITES WITH NEGATIVE RHEUMATOID FACTOR: ICD-10-CM

## 2023-03-30 DIAGNOSIS — Z09 CHEMOTHERAPY FOLLOW-UP EXAMINATION: ICD-10-CM

## 2023-03-30 DIAGNOSIS — Z51.81 ENCOUNTER FOR MEDICATION MONITORING: ICD-10-CM

## 2023-03-30 LAB
ALBUMIN SERPL BCP-MCNC: 4.3 G/DL (ref 3.5–5.2)
ALP SERPL-CCNC: 89 U/L (ref 55–135)
ALT SERPL W/O P-5'-P-CCNC: 28 U/L (ref 10–44)
ANION GAP SERPL CALC-SCNC: 12 MMOL/L (ref 8–16)
AST SERPL-CCNC: 14 U/L (ref 10–40)
BASOPHILS # BLD AUTO: 0.07 K/UL (ref 0–0.2)
BASOPHILS NFR BLD: 0.5 % (ref 0–1.9)
BILIRUB SERPL-MCNC: 0.4 MG/DL (ref 0.1–1)
BUN SERPL-MCNC: 14 MG/DL (ref 6–20)
CALCIUM SERPL-MCNC: 9.1 MG/DL (ref 8.7–10.5)
CHLORIDE SERPL-SCNC: 104 MMOL/L (ref 95–110)
CO2 SERPL-SCNC: 26 MMOL/L (ref 23–29)
CREAT SERPL-MCNC: 1.1 MG/DL (ref 0.5–1.4)
CRP SERPL-MCNC: 0.1 MG/L (ref 0–8.2)
DIFFERENTIAL METHOD: ABNORMAL
EOSINOPHIL # BLD AUTO: 0.1 K/UL (ref 0–0.5)
EOSINOPHIL NFR BLD: 0.4 % (ref 0–8)
ERYTHROCYTE [DISTWIDTH] IN BLOOD BY AUTOMATED COUNT: 13.1 % (ref 11.5–14.5)
ERYTHROCYTE [SEDIMENTATION RATE] IN BLOOD BY PHOTOMETRIC METHOD: 9 MM/HR (ref 0–36)
EST. GFR  (NO RACE VARIABLE): >60 ML/MIN/1.73 M^2
GLUCOSE SERPL-MCNC: 117 MG/DL (ref 70–110)
HCT VFR BLD AUTO: 40.5 % (ref 37–48.5)
HGB BLD-MCNC: 13.6 G/DL (ref 12–16)
IMM GRANULOCYTES # BLD AUTO: 0.2 K/UL (ref 0–0.04)
IMM GRANULOCYTES NFR BLD AUTO: 1.3 % (ref 0–0.5)
LYMPHOCYTES # BLD AUTO: 2.6 K/UL (ref 1–4.8)
LYMPHOCYTES NFR BLD: 17.1 % (ref 18–48)
MCH RBC QN AUTO: 30.2 PG (ref 27–31)
MCHC RBC AUTO-ENTMCNC: 33.6 G/DL (ref 32–36)
MCV RBC AUTO: 90 FL (ref 82–98)
MONOCYTES # BLD AUTO: 1.5 K/UL (ref 0.3–1)
MONOCYTES NFR BLD: 10.1 % (ref 4–15)
NEUTROPHILS # BLD AUTO: 10.6 K/UL (ref 1.8–7.7)
NEUTROPHILS NFR BLD: 70.6 % (ref 38–73)
NRBC BLD-RTO: 0 /100 WBC
PLATELET # BLD AUTO: 235 K/UL (ref 150–450)
PMV BLD AUTO: 11.5 FL (ref 9.2–12.9)
POTASSIUM SERPL-SCNC: 3.3 MMOL/L (ref 3.5–5.1)
PROT SERPL-MCNC: 7.6 G/DL (ref 6–8.4)
RBC # BLD AUTO: 4.5 M/UL (ref 4–5.4)
SODIUM SERPL-SCNC: 142 MMOL/L (ref 136–145)
WBC # BLD AUTO: 15.05 K/UL (ref 3.9–12.7)

## 2023-03-30 PROCEDURE — 3077F PR MOST RECENT SYSTOLIC BLOOD PRESSURE >= 140 MM HG: ICD-10-PCS | Mod: CPTII,S$GLB,, | Performed by: INTERNAL MEDICINE

## 2023-03-30 PROCEDURE — 4010F PR ACE/ARB THEARPY RXD/TAKEN: ICD-10-PCS | Mod: CPTII,S$GLB,, | Performed by: INTERNAL MEDICINE

## 2023-03-30 PROCEDURE — 99214 PR OFFICE/OUTPT VISIT, EST, LEVL IV, 30-39 MIN: ICD-10-PCS | Mod: 25,S$GLB,, | Performed by: INTERNAL MEDICINE

## 2023-03-30 PROCEDURE — 99214 OFFICE O/P EST MOD 30 MIN: CPT | Mod: 25,S$GLB,, | Performed by: INTERNAL MEDICINE

## 2023-03-30 PROCEDURE — 1159F MED LIST DOCD IN RCRD: CPT | Mod: CPTII,S$GLB,, | Performed by: INTERNAL MEDICINE

## 2023-03-30 PROCEDURE — 85025 COMPLETE CBC W/AUTO DIFF WBC: CPT | Performed by: INTERNAL MEDICINE

## 2023-03-30 PROCEDURE — 3008F BODY MASS INDEX DOCD: CPT | Mod: CPTII,S$GLB,, | Performed by: INTERNAL MEDICINE

## 2023-03-30 PROCEDURE — 3077F SYST BP >= 140 MM HG: CPT | Mod: CPTII,S$GLB,, | Performed by: INTERNAL MEDICINE

## 2023-03-30 PROCEDURE — 3080F PR MOST RECENT DIASTOLIC BLOOD PRESSURE >= 90 MM HG: ICD-10-PCS | Mod: CPTII,S$GLB,, | Performed by: INTERNAL MEDICINE

## 2023-03-30 PROCEDURE — 3080F DIAST BP >= 90 MM HG: CPT | Mod: CPTII,S$GLB,, | Performed by: INTERNAL MEDICINE

## 2023-03-30 PROCEDURE — 99999 PR PBB SHADOW E&M-EST. PATIENT-LVL V: ICD-10-PCS | Mod: PBBFAC,,, | Performed by: INTERNAL MEDICINE

## 2023-03-30 PROCEDURE — 99999 PR PBB SHADOW E&M-EST. PATIENT-LVL V: CPT | Mod: PBBFAC,,, | Performed by: INTERNAL MEDICINE

## 2023-03-30 PROCEDURE — 4010F ACE/ARB THERAPY RXD/TAKEN: CPT | Mod: CPTII,S$GLB,, | Performed by: INTERNAL MEDICINE

## 2023-03-30 PROCEDURE — 1159F PR MEDICATION LIST DOCUMENTED IN MEDICAL RECORD: ICD-10-PCS | Mod: CPTII,S$GLB,, | Performed by: INTERNAL MEDICINE

## 2023-03-30 PROCEDURE — 85652 RBC SED RATE AUTOMATED: CPT | Performed by: INTERNAL MEDICINE

## 2023-03-30 PROCEDURE — 86140 C-REACTIVE PROTEIN: CPT | Performed by: INTERNAL MEDICINE

## 2023-03-30 PROCEDURE — 3008F PR BODY MASS INDEX (BMI) DOCUMENTED: ICD-10-PCS | Mod: CPTII,S$GLB,, | Performed by: INTERNAL MEDICINE

## 2023-03-30 PROCEDURE — 96372 PR INJECTION,THERAP/PROPH/DIAG2ST, IM OR SUBCUT: ICD-10-PCS | Mod: S$GLB,,, | Performed by: INTERNAL MEDICINE

## 2023-03-30 PROCEDURE — 96372 THER/PROPH/DIAG INJ SC/IM: CPT | Mod: S$GLB,,, | Performed by: INTERNAL MEDICINE

## 2023-03-30 PROCEDURE — 36415 COLL VENOUS BLD VENIPUNCTURE: CPT | Performed by: INTERNAL MEDICINE

## 2023-03-30 PROCEDURE — 80053 COMPREHEN METABOLIC PANEL: CPT | Performed by: INTERNAL MEDICINE

## 2023-03-30 RX ORDER — ALBUTEROL SULFATE 90 UG/1
2 AEROSOL, METERED RESPIRATORY (INHALATION)
COMMUNITY

## 2023-03-30 RX ORDER — CLONIDINE HYDROCHLORIDE 0.2 MG/1
0.2 TABLET ORAL 3 TIMES DAILY
COMMUNITY
Start: 2022-12-05

## 2023-03-30 RX ORDER — TOBRAMYCIN AND DEXAMETHASONE 3; 1 MG/ML; MG/ML
SUSPENSION/ DROPS OPHTHALMIC
COMMUNITY

## 2023-03-30 RX ORDER — METHOTREXATE 2.5 MG/1
TABLET ORAL
COMMUNITY
End: 2023-10-19 | Stop reason: ALTCHOICE

## 2023-03-30 RX ORDER — TRIAMTERENE/HYDROCHLOROTHIAZID 37.5-25 MG
1 TABLET ORAL EVERY MORNING
COMMUNITY
End: 2023-07-31

## 2023-03-30 RX ORDER — MELOXICAM 7.5 MG/1
1 TABLET ORAL
COMMUNITY

## 2023-03-30 RX ORDER — BUMETANIDE 1 MG/1
1 TABLET ORAL 2 TIMES DAILY
COMMUNITY
Start: 2023-01-12

## 2023-03-30 RX ORDER — PROMETHAZINE HYDROCHLORIDE AND DEXTROMETHORPHAN HYDROBROMIDE 6.25; 15 MG/5ML; MG/5ML
SYRUP ORAL
COMMUNITY

## 2023-03-30 RX ORDER — IPRATROPIUM BROMIDE 42 UG/1
SPRAY, METERED NASAL
COMMUNITY
Start: 2022-11-22

## 2023-03-30 RX ORDER — KETOROLAC TROMETHAMINE 30 MG/ML
60 INJECTION, SOLUTION INTRAMUSCULAR; INTRAVENOUS
Status: COMPLETED | OUTPATIENT
Start: 2023-03-30 | End: 2023-03-30

## 2023-03-30 RX ORDER — TIZANIDINE 4 MG/1
TABLET ORAL
COMMUNITY

## 2023-03-30 RX ORDER — PREDNISONE 5 MG/1
5 TABLET ORAL DAILY
Qty: 90 TABLET | Refills: 1 | Status: SHIPPED | OUTPATIENT
Start: 2023-03-30 | End: 2023-08-23

## 2023-03-30 RX ADMIN — KETOROLAC TROMETHAMINE 60 MG: 30 INJECTION, SOLUTION INTRAMUSCULAR; INTRAVENOUS at 09:03

## 2023-03-30 NOTE — NURSING
Administered 2 cc  Toradol 30mg/cc  to left ventrogluteal. Pt tolerated well. No acute reaction noted to site. Pt instructed on S/S to report. Advised patient to wait in lobby 15 minutes after receiving injection to monitor for any reactions. Pt verbalized understanding.     Lot: [id number]  Exp: 04/2024        Side effects: anaphylaxis, diaphoresis (sweating), injection site reaction/pain, headache, hypertension, ecchymosis (bruising), constipation, abdominal pain.

## 2023-03-30 NOTE — PROGRESS NOTES
RHEUMATOLOGY CLINIC FOLLOW UP VISIT  Chief complaints, HPI, ROS, EXAM, Assessment & Plans:-  Denise Sapp a 47 y.o. pleasant female comes in for FOLLOW-UP VISIT.  Microscopic polyangiitis on Actemra infusion and CellCept after completing Cytoxan induction therapy.  Reports doing well in terms of arthritis but still has chronic diffuse pain associated with degenerative disc disease.  Taking antibiotics for sinus infection.  No synovitis on exam today.    1. Microscopic polyangiitis    2. Rheumatoid arthritis of multiple sites with negative rheumatoid factor    3. Antiphospholipid antibody with hypercoagulable state    4. Immunocompromised    5. Chronic pain syndrome      Problem List Items Addressed This Visit       Rheumatoid arthritis of multiple sites with negative rheumatoid factor    Relevant Medications    ketorolac injection 60 mg (Completed)    Other Relevant Orders    Ambulatory referral/consult to Pain Clinic    Microscopic polyangiitis - Primary    Relevant Medications    ketorolac injection 60 mg (Completed)    predniSONE (DELTASONE) 5 MG tablet    Other Relevant Orders    Ambulatory referral/consult to Pain Clinic    Immunocompromised    Chronic pain syndrome    Relevant Orders    Ambulatory referral/consult to Pain Clinic    Antiphospholipid antibody with hypercoagulable state       Labs reviewed today:-   Latest Reference Range & Units 03/30/23 08:59   WBC 3.90 - 12.70 K/uL 15.05 (H)   RBC 4.00 - 5.40 M/uL 4.50   Hemoglobin 12.0 - 16.0 g/dL 13.6   Hematocrit 37.0 - 48.5 % 40.5   MCV 82 - 98 fL 90   MCH 27.0 - 31.0 pg 30.2   MCHC 32.0 - 36.0 g/dL 33.6   RDW 11.5 - 14.5 % 13.1   Platelets 150 - 450 K/uL 235   MPV 9.2 - 12.9 fL 11.5   Gran % 38.0 - 73.0 % 70.6   Lymph % 18.0 - 48.0 % 17.1 (L)   Mono % 4.0 - 15.0 % 10.1   Eosinophil % 0.0 - 8.0 % 0.4   Basophil % 0.0 - 1.9 % 0.5   Immature Granulocytes 0.0 - 0.5 % 1.3 (H)   Gran # (ANC) 1.8 -  7.7 K/uL 10.6 (H)   Lymph # 1.0 - 4.8 K/uL 2.6   Mono # 0.3 - 1.0 K/uL 1.5 (H)   Eos # 0.0 - 0.5 K/uL 0.1   Baso # 0.00 - 0.20 K/uL 0.07   Immature Grans (Abs) 0.00 - 0.04 K/uL 0.20 (H)   nRBC 0 /100 WBC 0   Differential Method  Automated   Sodium 136 - 145 mmol/L 142   Potassium 3.5 - 5.1 mmol/L 3.3 (L)   Chloride 95 - 110 mmol/L 104   CO2 23 - 29 mmol/L 26   Anion Gap 8 - 16 mmol/L 12   BUN 6 - 20 mg/dL 14   Creatinine 0.5 - 1.4 mg/dL 1.1   eGFR >60 mL/min/1.73 m^2 >60   Glucose 70 - 110 mg/dL 117 (H)   Calcium 8.7 - 10.5 mg/dL 9.1   Alkaline Phosphatase 55 - 135 U/L 89   PROTEIN TOTAL 6.0 - 8.4 g/dL 7.6   Albumin 3.5 - 5.2 g/dL 4.3   BILIRUBIN TOTAL 0.1 - 1.0 mg/dL 0.4   AST 10 - 40 U/L 14   ALT 10 - 44 U/L 28   CRP 0.0 - 8.2 mg/L 0.1   (H): Data is abnormally high  (L): Data is abnormally low      Elevated white cell count secondary to sinus infection.  Complete antibiotic course as advised by primary care physician.  Hold CellCept until completion of antibiotic therapy.  IM Toradol for chronic pain.  Reduce prednisone to 5 mg daily  External referral to establish care with new pain specialist for chronic pain management with opioid therapy.  Continue Actemra infusion every 4 weeks.  I have explained all of the above in detail and the patient understands all of the current recommendation(s). I have answered all questions to the best of my ability and to their complete satisfaction.     # Follow up in about 6 months (around 9/30/2023).      Disclaimer: This note was prepared using voice recognition system and is likely to have sound alike errors and is not proof read.  Please call me with any questions.

## 2023-04-04 ENCOUNTER — PATIENT MESSAGE (OUTPATIENT)
Dept: RESEARCH | Facility: HOSPITAL | Age: 48
End: 2023-04-04
Payer: MEDICARE

## 2023-04-17 ENCOUNTER — SPECIALTY PHARMACY (OUTPATIENT)
Dept: PHARMACY | Facility: CLINIC | Age: 48
End: 2023-04-17
Payer: MEDICARE

## 2023-04-17 NOTE — TELEPHONE ENCOUNTER
Specialty Pharmacy - Refill Coordination    Specialty Medication Orders Linked to Encounter      Flowsheet Row Most Recent Value   Medication #1 mycophenolate (CELLCEPT) 500 mg Tab (Order#[id number], Rx#[id number])            Refill Questions - Documented Responses      Flowsheet Row Most Recent Value   Patient Availability and HIPAA Verification    Does patient want to proceed with activity? Yes   HIPAA/medical authority confirmed? Yes   Relationship to patient of person spoken to? Self   Refill Screening Questions    Changes to allergies? No   Changes to medications? No   New conditions since last clinic visit? No   Unplanned office visit, urgent care, ED, or hospital admission in the last 4 weeks? No   How does patient/caregiver feel medication is working? Good   Financial problems or insurance changes? No   How many doses of your specialty medications were missed in the last 4 weeks? 0   Would patient like to speak to a pharmacist? No   When does the patient need to receive the medication? 04/24/23   Refill Delivery Questions    How will the patient receive the medication? Mail   When does the patient need to receive the medication? 04/24/23   Shipping Address Home   Address in [facility] confirmed and updated if neccessary? Yes   Expected Copay ($) 0   Is the patient able to afford the medication copay? Yes   Payment Method zero copay   Days supply of Refill 30   Supplies needed? No supplies needed   Refill activity completed? Yes   Refill activity plan Refill scheduled   Shipment/Pickup Date: 04/19/23            Current Outpatient Medications   Medication Sig    albuterol (PROVENTIL/VENTOLIN HFA) 90 mcg/actuation inhaler 2 puffs as needed.    amlodipine-valsartan (EXFORGE)  mg per tablet Take 1 tablet by mouth once daily.    aspirin 325 MG tablet     atorvastatin (LIPITOR) 10 MG tablet     bumetanide (BUMEX) 1 MG tablet Take 1 mg by mouth.    cholecalciferol, vitamin D3, 1,250 mcg (50,000 unit)  capsule TAKE 1 CAPSULE BY MOUTH ONCE A WEEK FOR VIT. D DEFICIENCY    clonazePAM (KLONOPIN) 1 MG tablet TAKE 1 TABLET BY MOUTH ONCE DAILY AT BEDTIME AS NEEDED FOR ANXIETY SLEEP    cloNIDine (CATAPRES) 0.2 MG tablet 1 tablet 0.2 mg mid day for blood pressure flare up, cont 0.3 mg BID    cyclobenzaprine (FLEXERIL) 10 MG tablet Take 1 tablet (10 mg total) by mouth 3 (three) times daily as needed for Muscle spasms.    DULoxetine (CYMBALTA) 20 MG capsule Take 20 mg by mouth once daily.    FEROSUL 325 mg (65 mg iron) Tab tablet TAKE 2 TABLETS BY MOUTH DAILY FOR IRON DEFICIENCY    ipratropium (ATROVENT) 42 mcg (0.06 %) nasal spray SPRAY 2 SPRAYS IN EACH NOSTRIL IN THE MORNING, 2 SPRAYS AT NOON, 2 SPRAYS IN THE EVENING, AND 2 SPRAYS BEFORE BEDTIME    meloxicam (MOBIC) 7.5 MG tablet 1 tablet.    methotrexate 2.5 MG Tab as directed    methylPREDNISolone (MEDROL DOSEPACK) 4 mg tablet use as directed    mycophenolate (CELLCEPT) 500 mg Tab Take 2 tablets (1,000 mg total) by mouth 2 (two) times daily.    ondansetron (ZOFRAN) 4 MG tablet Take 1 tablet (4 mg total) by mouth every 8 (eight) hours as needed for Nausea.    potassium chloride SA (K-DUR,KLOR-CON) 20 MEQ tablet Take 1 tablet (20 mEq total) by mouth 2 (two) times daily.    predniSONE (DELTASONE) 5 MG tablet Take 1 tablet (5 mg total) by mouth once daily.    promethazine-dextromethorphan (PROMETHAZINE-DM) 6.25-15 mg/5 mL Syrp 5 ml as needed    temazepam (RESTORIL) 15 mg Cap 30 mg.    tiZANidine (ZANAFLEX) 4 MG tablet TAKE 1 TO 2 TABLETS BY MOUTH THREE TIMES DAILY AS NEEDED FOR PAIN    tobramycin-dexAMETHasone 0.3-0.1% (TOBRADEX) 0.3-0.1 % DrpS 1 drop into affected eye    triamterene-hydrochlorothiazide 37.5-25 mg (MAXZIDE-25) 37.5-25 mg per tablet 1 tablet once daily.   Last reviewed on 3/30/2023  9:54 AM by Maribel Nuñez MA    Review of patient's allergies indicates:   Allergen Reactions    Oranges [orange] Hives    Last reviewed on  3/30/2023 9:54 AM by Maribel  Joaquin      Tasks added this encounter   5/17/2023 - Refill Call (Auto Added)   Tasks due within next 3 months   No tasks due.     [facility]  [address]  [facility] [address]  Phone: [phone]  Fax: [phone]

## 2023-04-20 ENCOUNTER — INFUSION (OUTPATIENT)
Dept: INFUSION THERAPY | Facility: HOSPITAL | Age: 48
End: 2023-04-20
Attending: INTERNAL MEDICINE
Payer: MEDICARE

## 2023-04-20 NOTE — NURSING
Infusion Actemra - 800 mg q 4weeks  Last dose- 3/23/23    Any:  -recent illness, infection, or antibiotic use in past week- pt states she is on antibiotics currently for a UTI and finished antibiotic therapy 4/21. Reschedule infusion to 1 week after finishing antibiotics per Dr. JAMISON   -open wounds or mouth sores- denies  -invasive procedures or surgeries in past 4 weeks or in upcoming 4 weeks- denies  -vaccinations in past week- denies  -any new symptoms/change in symptoms-denies  -chance you may be pregnant- denies      Recent labs? 4/20/23  Last Rheumatology provider visit- Seen by Dr. LÓPEZ on 3/30/23     Actemra infusion rescheduled to 4/28 d/t UTI per Dr. LÓPEZ.

## 2023-04-28 ENCOUNTER — INFUSION (OUTPATIENT)
Dept: INFUSION THERAPY | Facility: HOSPITAL | Age: 48
End: 2023-04-28
Attending: INTERNAL MEDICINE
Payer: MEDICARE

## 2023-04-28 VITALS
HEIGHT: 62 IN | SYSTOLIC BLOOD PRESSURE: 114 MMHG | OXYGEN SATURATION: 98 % | TEMPERATURE: 97 F | BODY MASS INDEX: 44.83 KG/M2 | DIASTOLIC BLOOD PRESSURE: 74 MMHG | WEIGHT: 243.63 LBS | HEART RATE: 69 BPM | RESPIRATION RATE: 18 BRPM

## 2023-04-28 DIAGNOSIS — M06.09 RHEUMATOID ARTHRITIS OF MULTIPLE SITES WITH NEGATIVE RHEUMATOID FACTOR: Primary | ICD-10-CM

## 2023-04-28 PROCEDURE — 96365 THER/PROPH/DIAG IV INF INIT: CPT

## 2023-04-28 PROCEDURE — 25000003 PHARM REV CODE 250: Performed by: STUDENT IN AN ORGANIZED HEALTH CARE EDUCATION/TRAINING PROGRAM

## 2023-04-28 PROCEDURE — 63600175 PHARM REV CODE 636 W HCPCS: Mod: JZ,JG | Performed by: STUDENT IN AN ORGANIZED HEALTH CARE EDUCATION/TRAINING PROGRAM

## 2023-04-28 RX ORDER — SODIUM CHLORIDE 0.9 % (FLUSH) 0.9 %
10 SYRINGE (ML) INJECTION
Status: CANCELLED | OUTPATIENT
Start: 2023-05-19

## 2023-04-28 RX ORDER — HEPARIN 100 UNIT/ML
500 SYRINGE INTRAVENOUS
Status: CANCELLED | OUTPATIENT
Start: 2023-05-19

## 2023-04-28 RX ORDER — SODIUM CHLORIDE 0.9 % (FLUSH) 0.9 %
10 SYRINGE (ML) INJECTION
Status: DISCONTINUED | OUTPATIENT
Start: 2023-04-28 | End: 2023-04-28 | Stop reason: HOSPADM

## 2023-04-28 RX ADMIN — TOCILIZUMAB 800 MG: 20 INJECTION, SOLUTION, CONCENTRATE INTRAVENOUS at 09:04

## 2023-04-28 NOTE — NURSING
Infusion# >10  Next infusion is scheduled for 5/26/23.    Recent labs? Reviewed    Premeds? None    S/S of current or recent infections in the past 14 days? None/Denies    Recent Surgery/invasive procedures? None/Denies     Any recent vaccines ? None/Denies    Actemra 800 mg administered IV at a 60 minute rate per orders; see MAR and vitals for more  Details.

## 2023-05-22 ENCOUNTER — SPECIALTY PHARMACY (OUTPATIENT)
Dept: PHARMACY | Facility: CLINIC | Age: 48
End: 2023-05-22
Payer: MEDICARE

## 2023-05-23 NOTE — TELEPHONE ENCOUNTER
Specialty Pharmacy - Refill Coordination    Specialty Medication Orders Linked to Encounter      Flowsheet Row Most Recent Value   Medication #1 mycophenolate (CELLCEPT) 500 mg Tab (Order#[id number], Rx#[id number])            Refill Questions - Documented Responses      Flowsheet Row Most Recent Value   Patient Availability and HIPAA Verification    Does patient want to proceed with activity? Yes   HIPAA/medical authority confirmed? Yes   Relationship to patient of person spoken to? Self   Refill Screening Questions    Changes to allergies? No   Changes to medications? No   New conditions since last clinic visit? No   Unplanned office visit, urgent care, ED, or hospital admission in the last 4 weeks? No   How does patient/caregiver feel medication is working? Good   Financial problems or insurance changes? No   How many doses of your specialty medications were missed in the last 4 weeks? 0   Would patient like to speak to a pharmacist? No   When does the patient need to receive the medication? 05/30/23   Refill Delivery Questions    How will the patient receive the medication? Mail   When does the patient need to receive the medication? 05/30/23   Shipping Address Home   Address in [facility] confirmed and updated if neccessary? Yes   Expected Copay ($) 0   Is the patient able to afford the medication copay? Yes   Payment Method zero copay   Days supply of Refill 30   Supplies needed? No supplies needed   Refill activity completed? Yes   Refill activity plan Refill scheduled   Shipment/Pickup Date: 05/25/23            Current Outpatient Medications   Medication Sig    albuterol (PROVENTIL/VENTOLIN HFA) 90 mcg/actuation inhaler 2 puffs as needed.    amlodipine-valsartan (EXFORGE)  mg per tablet Take 1 tablet by mouth once daily.    aspirin 325 MG tablet     atorvastatin (LIPITOR) 10 MG tablet     bumetanide (BUMEX) 1 MG tablet Take 1 mg by mouth.    cholecalciferol, vitamin D3, 1,250 mcg (50,000 unit)  capsule TAKE 1 CAPSULE BY MOUTH ONCE A WEEK FOR VIT. D DEFICIENCY    clonazePAM (KLONOPIN) 1 MG tablet TAKE 1 TABLET BY MOUTH ONCE DAILY AT BEDTIME AS NEEDED FOR ANXIETY SLEEP    cloNIDine (CATAPRES) 0.2 MG tablet 1 tablet 0.2 mg mid day for blood pressure flare up, cont 0.3 mg BID    cyclobenzaprine (FLEXERIL) 10 MG tablet Take 1 tablet (10 mg total) by mouth 3 (three) times daily as needed for Muscle spasms.    DULoxetine (CYMBALTA) 20 MG capsule Take 20 mg by mouth once daily.    FEROSUL 325 mg (65 mg iron) Tab tablet TAKE 2 TABLETS BY MOUTH DAILY FOR IRON DEFICIENCY    ipratropium (ATROVENT) 42 mcg (0.06 %) nasal spray SPRAY 2 SPRAYS IN EACH NOSTRIL IN THE MORNING, 2 SPRAYS AT NOON, 2 SPRAYS IN THE EVENING, AND 2 SPRAYS BEFORE BEDTIME    meloxicam (MOBIC) 7.5 MG tablet 1 tablet.    methotrexate 2.5 MG Tab as directed    methylPREDNISolone (MEDROL DOSEPACK) 4 mg tablet use as directed    mycophenolate (CELLCEPT) 500 mg Tab Take 2 tablets (1,000 mg total) by mouth 2 (two) times daily.    ondansetron (ZOFRAN) 4 MG tablet Take 1 tablet (4 mg total) by mouth every 8 (eight) hours as needed for Nausea.    potassium chloride SA (K-DUR,KLOR-CON) 20 MEQ tablet Take 1 tablet (20 mEq total) by mouth 2 (two) times daily.    predniSONE (DELTASONE) 5 MG tablet Take 1 tablet (5 mg total) by mouth once daily.    promethazine-dextromethorphan (PROMETHAZINE-DM) 6.25-15 mg/5 mL Syrp 5 ml as needed    temazepam (RESTORIL) 15 mg Cap 30 mg.    tiZANidine (ZANAFLEX) 4 MG tablet TAKE 1 TO 2 TABLETS BY MOUTH THREE TIMES DAILY AS NEEDED FOR PAIN    tobramycin-dexAMETHasone 0.3-0.1% (TOBRADEX) 0.3-0.1 % DrpS 1 drop into affected eye    triamterene-hydrochlorothiazide 37.5-25 mg (MAXZIDE-25) 37.5-25 mg per tablet 1 tablet once daily.   Last reviewed on 4/28/2023  8:59 AM by Jina Urias RN    Review of patient's allergies indicates:   Allergen Reactions    Oranges [orange] Hives    Last reviewed on  4/28/2023 8:59 AM by Jina  O'Rafaela      Tasks added this encounter   No tasks added.   Tasks due within next 3 months   5/23/2023 - Refill Coordination Outreach (1 time occurrence)     [facility]  [address]  [facility] [address]  Phone: [phone]  Fax: [phone]

## 2023-05-26 ENCOUNTER — INFUSION (OUTPATIENT)
Dept: INFUSION THERAPY | Facility: HOSPITAL | Age: 48
End: 2023-05-26
Attending: INTERNAL MEDICINE
Payer: MEDICARE

## 2023-05-26 VITALS
SYSTOLIC BLOOD PRESSURE: 130 MMHG | OXYGEN SATURATION: 98 % | WEIGHT: 245.38 LBS | RESPIRATION RATE: 16 BRPM | TEMPERATURE: 97 F | BODY MASS INDEX: 44.88 KG/M2 | DIASTOLIC BLOOD PRESSURE: 84 MMHG | HEART RATE: 64 BPM

## 2023-05-26 DIAGNOSIS — M06.09 RHEUMATOID ARTHRITIS OF MULTIPLE SITES WITH NEGATIVE RHEUMATOID FACTOR: Primary | ICD-10-CM

## 2023-05-26 PROCEDURE — 25000003 PHARM REV CODE 250: Performed by: INTERNAL MEDICINE

## 2023-05-26 PROCEDURE — 96365 THER/PROPH/DIAG IV INF INIT: CPT

## 2023-05-26 PROCEDURE — 63600175 PHARM REV CODE 636 W HCPCS: Mod: JZ,JG | Performed by: INTERNAL MEDICINE

## 2023-05-26 RX ORDER — SODIUM CHLORIDE 0.9 % (FLUSH) 0.9 %
10 SYRINGE (ML) INJECTION
Status: CANCELLED | OUTPATIENT
Start: 2023-06-16

## 2023-05-26 RX ORDER — HEPARIN 100 UNIT/ML
500 SYRINGE INTRAVENOUS
Status: CANCELLED | OUTPATIENT
Start: 2023-06-16

## 2023-05-26 RX ADMIN — TOCILIZUMAB 800 MG: 20 INJECTION, SOLUTION, CONCENTRATE INTRAVENOUS at 09:05

## 2023-05-26 NOTE — PLAN OF CARE
Discussed plan of care with pt. Addressed any and ongoing concerns. Pt denies    Problem: Adult Inpatient Plan of Care  Goal: Plan of Care Review  Outcome: Ongoing, Progressing  Flowsheets (Taken 5/26/2023 1447)  Plan of Care Reviewed With: patient  Goal: Patient-Specific Goal (Individualized)  Outcome: Ongoing, Progressing  Goal: Absence of Hospital-Acquired Illness or Injury  Outcome: Ongoing, Progressing  Intervention: Identify and Manage Fall Risk  Flowsheets (Taken 5/26/2023 1447)  Safety Promotion/Fall Prevention: in recliner, wheels locked  Intervention: Prevent Infection  Flowsheets (Taken 5/26/2023 1447)  Infection Prevention:   hand hygiene promoted   equipment surfaces disinfected  Goal: Optimal Comfort and Wellbeing  Outcome: Ongoing, Progressing  Intervention: Provide Person-Centered Care  Flowsheets (Taken 5/26/2023 1447)  Trust Relationship/Rapport:   care explained   reassurance provided   choices provided   thoughts/feelings acknowledged   emotional support provided   empathic listening provided   questions answered   questions encouraged

## 2023-06-02 ENCOUNTER — OFFICE VISIT (OUTPATIENT)
Dept: HEMATOLOGY/ONCOLOGY | Facility: CLINIC | Age: 48
End: 2023-06-02
Payer: MEDICARE

## 2023-06-02 ENCOUNTER — LAB VISIT (OUTPATIENT)
Dept: LAB | Facility: HOSPITAL | Age: 48
End: 2023-06-02
Attending: NURSE PRACTITIONER
Payer: MEDICARE

## 2023-06-02 VITALS
HEIGHT: 62 IN | WEIGHT: 247.56 LBS | TEMPERATURE: 98 F | SYSTOLIC BLOOD PRESSURE: 141 MMHG | DIASTOLIC BLOOD PRESSURE: 97 MMHG | BODY MASS INDEX: 45.56 KG/M2 | OXYGEN SATURATION: 96 % | HEART RATE: 69 BPM

## 2023-06-02 DIAGNOSIS — Z12.11 COLON CANCER SCREENING: ICD-10-CM

## 2023-06-02 DIAGNOSIS — D68.61 ANTIPHOSPHOLIPID ANTIBODY WITH HYPERCOAGULABLE STATE: Primary | ICD-10-CM

## 2023-06-02 DIAGNOSIS — D68.61 ANTIPHOSPHOLIPID ANTIBODY WITH HYPERCOAGULABLE STATE: ICD-10-CM

## 2023-06-02 PROCEDURE — 85613 RUSSELL VIPER VENOM DILUTED: CPT | Performed by: NURSE PRACTITIONER

## 2023-06-02 PROCEDURE — 99999 PR PBB SHADOW E&M-EST. PATIENT-LVL V: ICD-10-PCS | Mod: PBBFAC,,, | Performed by: NURSE PRACTITIONER

## 2023-06-02 PROCEDURE — 86147 CARDIOLIPIN ANTIBODY EA IG: CPT | Performed by: NURSE PRACTITIONER

## 2023-06-02 PROCEDURE — 99214 PR OFFICE/OUTPT VISIT, EST, LEVL IV, 30-39 MIN: ICD-10-PCS | Mod: S$GLB,,, | Performed by: NURSE PRACTITIONER

## 2023-06-02 PROCEDURE — 36415 COLL VENOUS BLD VENIPUNCTURE: CPT | Performed by: NURSE PRACTITIONER

## 2023-06-02 PROCEDURE — 1159F MED LIST DOCD IN RCRD: CPT | Mod: CPTII,S$GLB,, | Performed by: NURSE PRACTITIONER

## 2023-06-02 PROCEDURE — 99999 PR PBB SHADOW E&M-EST. PATIENT-LVL V: CPT | Mod: PBBFAC,,, | Performed by: NURSE PRACTITIONER

## 2023-06-02 PROCEDURE — 3080F DIAST BP >= 90 MM HG: CPT | Mod: CPTII,S$GLB,, | Performed by: NURSE PRACTITIONER

## 2023-06-02 PROCEDURE — 3008F BODY MASS INDEX DOCD: CPT | Mod: CPTII,S$GLB,, | Performed by: NURSE PRACTITIONER

## 2023-06-02 PROCEDURE — 3080F PR MOST RECENT DIASTOLIC BLOOD PRESSURE >= 90 MM HG: ICD-10-PCS | Mod: CPTII,S$GLB,, | Performed by: NURSE PRACTITIONER

## 2023-06-02 PROCEDURE — 1160F RVW MEDS BY RX/DR IN RCRD: CPT | Mod: CPTII,S$GLB,, | Performed by: NURSE PRACTITIONER

## 2023-06-02 PROCEDURE — 4010F ACE/ARB THERAPY RXD/TAKEN: CPT | Mod: CPTII,S$GLB,, | Performed by: NURSE PRACTITIONER

## 2023-06-02 PROCEDURE — 4010F PR ACE/ARB THEARPY RXD/TAKEN: ICD-10-PCS | Mod: CPTII,S$GLB,, | Performed by: NURSE PRACTITIONER

## 2023-06-02 PROCEDURE — 3077F SYST BP >= 140 MM HG: CPT | Mod: CPTII,S$GLB,, | Performed by: NURSE PRACTITIONER

## 2023-06-02 PROCEDURE — 1159F PR MEDICATION LIST DOCUMENTED IN MEDICAL RECORD: ICD-10-PCS | Mod: CPTII,S$GLB,, | Performed by: NURSE PRACTITIONER

## 2023-06-02 PROCEDURE — 85610 PROTHROMBIN TIME: CPT | Performed by: NURSE PRACTITIONER

## 2023-06-02 PROCEDURE — 86146 BETA-2 GLYCOPROTEIN ANTIBODY: CPT | Performed by: NURSE PRACTITIONER

## 2023-06-02 PROCEDURE — 99214 OFFICE O/P EST MOD 30 MIN: CPT | Mod: S$GLB,,, | Performed by: NURSE PRACTITIONER

## 2023-06-02 PROCEDURE — 1160F PR REVIEW ALL MEDS BY PRESCRIBER/CLIN PHARMACIST DOCUMENTED: ICD-10-PCS | Mod: CPTII,S$GLB,, | Performed by: NURSE PRACTITIONER

## 2023-06-02 PROCEDURE — 3008F PR BODY MASS INDEX (BMI) DOCUMENTED: ICD-10-PCS | Mod: CPTII,S$GLB,, | Performed by: NURSE PRACTITIONER

## 2023-06-02 PROCEDURE — 3077F PR MOST RECENT SYSTOLIC BLOOD PRESSURE >= 140 MM HG: ICD-10-PCS | Mod: CPTII,S$GLB,, | Performed by: NURSE PRACTITIONER

## 2023-06-02 RX ORDER — NEBIVOLOL 20 MG/1
20 TABLET ORAL 2 TIMES DAILY
COMMUNITY
Start: 2023-04-22

## 2023-06-02 RX ORDER — SERTRALINE HYDROCHLORIDE 100 MG/1
TABLET, FILM COATED ORAL
COMMUNITY
Start: 2023-05-15

## 2023-06-02 NOTE — PROGRESS NOTES
Subjective:      Patient ID: Denise Flower is a 48 y.o. female.    Chief Complaint: generalize pain    HPI:  48 year old female followed in the hematology clinic for anticardiopid antibody +, DRVVT +, beta 2 glycoprotein + in the past.  Previously followed in the clinic by Dr. Saucedo.  Today is the first time I am evaluating/assessing th patient.  Has been taking ASA therapy daily.  Not currently on anticoagulation therapy.      States that she cannot remember if she had a blood clot in her leg before but thinks that she may have. States that all she can remember taking is aspirin.   have a miscarriage in the past in .      Maternal Grandmother: stroke.  Was placed on coumadin    2023 mammogram - negative for malignancy  PAP done 2022 - negative for malignancy     Social History     Socioeconomic History    Marital status: Single   Tobacco Use    Smoking status: Never    Smokeless tobacco: Never   Substance and Sexual Activity    Alcohol use: Not Currently       Family History   Problem Relation Age of Onset    Hypertension Mother     Cancer Father         esophegal       Past Surgical History:   Procedure Laterality Date     SECTION      TUBAL LIGATION         Past Medical History:   Diagnosis Date    Hypertension     Stroke            Review of Systems   Constitutional:  Positive for fatigue.   Eyes: Negative.    Respiratory: Negative.     Cardiovascular: Negative.    Gastrointestinal: Negative.    Endocrine: Negative.    Genitourinary: Negative.    Musculoskeletal:  Positive for arthralgias and myalgias.   Skin: Negative.    Allergic/Immunologic: Negative.    Neurological: Negative.    Hematological: Negative.    Psychiatric/Behavioral:  Positive for sleep disturbance.         Medication List with Changes/Refills   Current Medications    ALBUTEROL (PROVENTIL/VENTOLIN HFA) 90 MCG/ACTUATION INHALER    2 puffs as needed.    AMLODIPINE-VALSARTAN (EXFORGE)  MG PER TABLET    Take 1  tablet by mouth once daily.    ASPIRIN 325 MG TABLET        ATORVASTATIN (LIPITOR) 10 MG TABLET        BUMETANIDE (BUMEX) 1 MG TABLET    Take 1 mg by mouth.    CHOLECALCIFEROL, VITAMIN D3, 1,250 MCG (50,000 UNIT) CAPSULE    TAKE 1 CAPSULE BY MOUTH ONCE A WEEK FOR VIT. D DEFICIENCY    CLONAZEPAM (KLONOPIN) 1 MG TABLET    TAKE 1 TABLET BY MOUTH ONCE DAILY AT BEDTIME AS NEEDED FOR ANXIETY SLEEP    CLONIDINE (CATAPRES) 0.2 MG TABLET    1 tablet 0.2 mg mid day for blood pressure flare up, cont 0.3 mg BID    CYCLOBENZAPRINE (FLEXERIL) 10 MG TABLET    Take 1 tablet (10 mg total) by mouth 3 (three) times daily as needed for Muscle spasms.    DULOXETINE (CYMBALTA) 20 MG CAPSULE    Take 20 mg by mouth once daily.    FEROSUL 325 MG (65 MG IRON) TAB TABLET    TAKE 2 TABLETS BY MOUTH DAILY FOR IRON DEFICIENCY    IPRATROPIUM (ATROVENT) 42 MCG (0.06 %) NASAL SPRAY    SPRAY 2 SPRAYS IN EACH NOSTRIL IN THE MORNING, 2 SPRAYS AT NOON, 2 SPRAYS IN THE EVENING, AND 2 SPRAYS BEFORE BEDTIME    MELOXICAM (MOBIC) 7.5 MG TABLET    1 tablet.    METHOTREXATE 2.5 MG TAB    as directed    METHYLPREDNISOLONE (MEDROL DOSEPACK) 4 MG TABLET    use as directed    MYCOPHENOLATE (CELLCEPT) 500 MG TAB    Take 2 tablets (1,000 mg total) by mouth 2 (two) times daily.    NEBIVOLOL (BYSTOLIC) 20 MG TAB        ONDANSETRON (ZOFRAN) 4 MG TABLET    Take 1 tablet (4 mg total) by mouth every 8 (eight) hours as needed for Nausea.    POTASSIUM CHLORIDE SA (K-DUR,KLOR-CON) 20 MEQ TABLET    Take 1 tablet (20 mEq total) by mouth 2 (two) times daily.    PREDNISONE (DELTASONE) 5 MG TABLET    Take 1 tablet (5 mg total) by mouth once daily.    PROMETHAZINE-DEXTROMETHORPHAN (PROMETHAZINE-DM) 6.25-15 MG/5 ML SYRP    5 ml as needed    SERTRALINE (ZOLOFT) 100 MG TABLET        TEMAZEPAM (RESTORIL) 15 MG CAP    30 mg.    TIZANIDINE (ZANAFLEX) 4 MG TABLET    TAKE 1 TO 2 TABLETS BY MOUTH THREE TIMES DAILY AS NEEDED FOR PAIN    TOBRAMYCIN-DEXAMETHASONE 0.3-0.1% (TOBRADEX) 0.3-0.1  % DRPS    1 drop into affected eye    TRIAMTERENE-HYDROCHLOROTHIAZIDE 37.5-25 MG (MAXZIDE-25) 37.5-25 MG PER TABLET    1 tablet once daily.        Objective:     Vitals:    06/02/23 0919   BP: (!) 141/97   Pulse: 69   Temp: 97.8 °F (36.6 °C)       Physical Exam  Vitals reviewed.   Constitutional:       Appearance: Normal appearance.   HENT:      Head: Normocephalic and atraumatic.      Right Ear: External ear normal.      Left Ear: External ear normal.   Cardiovascular:      Rate and Rhythm: Normal rate and regular rhythm.      Heart sounds: Normal heart sounds, S1 normal and S2 normal.   Pulmonary:      Effort: Pulmonary effort is normal.      Breath sounds: Normal breath sounds.   Abdominal:      General: There is no distension.   Musculoskeletal:         General: Normal range of motion.      Cervical back: Normal range of motion.   Skin:     General: Skin is warm and dry.   Neurological:      General: No focal deficit present.      Mental Status: She is alert and oriented to person, place, and time.   Psychiatric:         Attention and Perception: Attention and perception normal.         Mood and Affect: Mood and affect normal.         Speech: Speech normal.         Behavior: Behavior normal. Behavior is cooperative.         Thought Content: Thought content normal.         Cognition and Memory: Cognition and memory normal.         Judgment: Judgment normal.       Assessment:     Problem List Items Addressed This Visit          Hematology    Antiphospholipid antibody with hypercoagulable state - Primary    Relevant Orders    Cardiolipin antibody    DRVVT    Beta-2 Glycoprotein Abs (IgA, IgG, IgM)     Other Visit Diagnoses       Colon cancer screening        Relevant Orders    Cologuard Screening (Multitarget Stool DNA)            Lab Results   Component Value Date    WBC 8.06 04/20/2023    RBC 4.40 04/20/2023    HGB 13.1 04/20/2023    HCT 40.3 04/20/2023    MCV 92 04/20/2023    MCH 29.8 04/20/2023    MCHC 32.5  04/20/2023    RDW 13.0 04/20/2023     04/20/2023    MPV 11.7 04/20/2023    GRAN 3.5 04/20/2023    GRAN 43.5 04/20/2023    LYMPH 3.1 04/20/2023    LYMPH 38.2 04/20/2023    MONO 1.2 (H) 04/20/2023    MONO 15.3 (H) 04/20/2023    EOS 0.2 04/20/2023    BASO 0.04 04/20/2023    EOSINOPHIL 2.1 04/20/2023    BASOPHIL 0.5 04/20/2023      Lab Results   Component Value Date     (H) 04/20/2023    K 3.0 (L) 04/20/2023     04/20/2023    CO2 28 04/20/2023    BUN 23 (H) 04/20/2023    CREATININE 1.2 04/20/2023    CALCIUM 10.0 04/20/2023    ANIONGAP 12 04/20/2023    ESTGFRAFRICA 56 (A) 07/11/2022    EGFRNONAA 48 (A) 07/11/2022     Lab Results   Component Value Date    ALT 33 04/20/2023    AST 16 04/20/2023    ALKPHOS 79 04/20/2023    BILITOT 0.5 04/20/2023       Plan:   Antiphospholipid antibody with hypercoagulable state  -     Cardiolipin antibody; Future; Expected date: 06/02/2023  -     DRVVT; Future; Expected date: 06/02/2023  -     Beta-2 Glycoprotein Abs (IgA, IgG, IgM); Future; Expected date: 06/02/2023    Colon cancer screening  -     Cologuard Screening (Multitarget Stool DNA); Future; Expected date: 06/02/2023    Repeat Beta 2 glycoprotein, DRVVT, anticardiolipin antibody testing.  Continue  mg PO daily.  F/u in 2 weeks to review results.  May start patient on DOAC once resutls received if verified antiphospholipid antibody syndrome confirmed with h/o blood clot DVT, Stroke, and miscarriage            Med Onc Chart Routing      Follow up with physician    Follow up with [facility] 2 weeks. fu in 2 weeks with a VV   Infusion scheduling note   n/a   Injection scheduling note n/a   Labs   Scheduling:  Preferred lab:  Lab interval:  Labs today   Imaging   N/a   Pharmacy appointment No pharmacy appointment needed      Other referrals     No additional referrals needed  n/a          Collaborating Provider:  Dr. Fernando Kiser    Thank You,  BALWINDER FrederickP-C  Hematology Oncology

## 2023-06-05 LAB
APTT IMM NP PPP: NORMAL SEC (ref 32–48)
APTT P HEP NEUT PPP: NORMAL SEC (ref 32–48)
B2 GLYCOPROT1 IGA SER QL: 13 U/ML
B2 GLYCOPROT1 IGG SER QL: 2.2 U/ML
B2 GLYCOPROT1 IGM SER QL: <2.4 U/ML
CARDIOLIPIN IGG SER IA-ACNC: <9.4 GPL (ref 0–14.99)
CARDIOLIPIN IGM SER IA-ACNC: <9.4 MPL (ref 0–12.49)
CONFIRM APTT STACLOT: NORMAL
DRVVT SCREEN TO CONFIRM RATIO: NORMAL RATIO
LA 3 SCREEN W REFLEX-IMP: NORMAL
LA NT DPL PPP QL: NORMAL
MIXING DRVVT: NORMAL SEC (ref 33–44)
PROTHROMBIN TIME: 13.1 SEC (ref 12–15.5)
REPTILASE TIME: NORMAL SEC
SCREEN APTT: 45 SEC (ref 32–48)
SCREEN DRVVT: 34 SEC (ref 33–44)
THROMBIN TIME: NORMAL SEC (ref 14.7–19.5)

## 2023-06-19 ENCOUNTER — SPECIALTY PHARMACY (OUTPATIENT)
Dept: PHARMACY | Facility: CLINIC | Age: 48
End: 2023-06-19
Payer: MEDICARE

## 2023-06-19 NOTE — TELEPHONE ENCOUNTER
Specialty Pharmacy - Refill Coordination    Specialty Medication Orders Linked to Encounter      Flowsheet Row Most Recent Value   Medication #1 mycophenolate (CELLCEPT) 500 mg Tab (Order#[id number], Rx#[id number])            Refill Questions - Documented Responses      Flowsheet Row Most Recent Value   Patient Availability and HIPAA Verification    Does patient want to proceed with activity? Yes   HIPAA/medical authority confirmed? Yes   Relationship to patient of person spoken to? Self   Refill Screening Questions    Changes to allergies? No   Changes to medications? No   New conditions since last clinic visit? No   Unplanned office visit, urgent care, ED, or hospital admission in the last 4 weeks? No   How does patient/caregiver feel medication is working? Good   Financial problems or insurance changes? No   How many doses of your specialty medications were missed in the last 4 weeks? 0   Would patient like to speak to a pharmacist? No   When does the patient need to receive the medication? 06/26/23   Refill Delivery Questions    How will the patient receive the medication? Mail   When does the patient need to receive the medication? 06/26/23   Shipping Address Home   Address in [facility] confirmed and updated if neccessary? Yes   Expected Copay ($) 0   Is the patient able to afford the medication copay? Yes   Payment Method zero copay   Days supply of Refill 30   Supplies needed? No supplies needed   Refill activity completed? Yes   Refill activity plan Refill scheduled   Shipment/Pickup Date: 06/22/23            Current Outpatient Medications   Medication Sig    albuterol (PROVENTIL/VENTOLIN HFA) 90 mcg/actuation inhaler 2 puffs as needed.    amlodipine-valsartan (EXFORGE)  mg per tablet Take 1 tablet by mouth once daily.    aspirin 325 MG tablet     atorvastatin (LIPITOR) 10 MG tablet     bumetanide (BUMEX) 1 MG tablet Take 1 mg by mouth.    cholecalciferol, vitamin D3, 1,250 mcg (50,000 unit)  capsule TAKE 1 CAPSULE BY MOUTH ONCE A WEEK FOR VIT. D DEFICIENCY    clonazePAM (KLONOPIN) 1 MG tablet TAKE 1 TABLET BY MOUTH ONCE DAILY AT BEDTIME AS NEEDED FOR ANXIETY SLEEP    cloNIDine (CATAPRES) 0.2 MG tablet 1 tablet 0.2 mg mid day for blood pressure flare up, cont 0.3 mg BID    cyclobenzaprine (FLEXERIL) 10 MG tablet Take 1 tablet (10 mg total) by mouth 3 (three) times daily as needed for Muscle spasms.    DULoxetine (CYMBALTA) 20 MG capsule Take 20 mg by mouth once daily.    FEROSUL 325 mg (65 mg iron) Tab tablet TAKE 2 TABLETS BY MOUTH DAILY FOR IRON DEFICIENCY    ipratropium (ATROVENT) 42 mcg (0.06 %) nasal spray SPRAY 2 SPRAYS IN EACH NOSTRIL IN THE MORNING, 2 SPRAYS AT NOON, 2 SPRAYS IN THE EVENING, AND 2 SPRAYS BEFORE BEDTIME    meloxicam (MOBIC) 7.5 MG tablet 1 tablet.    methotrexate 2.5 MG Tab as directed    methylPREDNISolone (MEDROL DOSEPACK) 4 mg tablet use as directed (Patient not taking: Reported on 6/2/2023)    mycophenolate (CELLCEPT) 500 mg Tab Take 2 tablets (1,000 mg total) by mouth 2 (two) times daily.    nebivoloL (BYSTOLIC) 20 mg Tab     ondansetron (ZOFRAN) 4 MG tablet Take 1 tablet (4 mg total) by mouth every 8 (eight) hours as needed for Nausea.    potassium chloride SA (K-DUR,KLOR-CON) 20 MEQ tablet Take 1 tablet (20 mEq total) by mouth 2 (two) times daily.    predniSONE (DELTASONE) 5 MG tablet Take 1 tablet (5 mg total) by mouth once daily. (Patient not taking: Reported on 6/2/2023)    promethazine-dextromethorphan (PROMETHAZINE-DM) 6.25-15 mg/5 mL Syrp 5 ml as needed    sertraline (ZOLOFT) 100 MG tablet     temazepam (RESTORIL) 15 mg Cap 30 mg.    tiZANidine (ZANAFLEX) 4 MG tablet TAKE 1 TO 2 TABLETS BY MOUTH THREE TIMES DAILY AS NEEDED FOR PAIN    tobramycin-dexAMETHasone 0.3-0.1% (TOBRADEX) 0.3-0.1 % DrpS 1 drop into affected eye    triamterene-hydrochlorothiazide 37.5-25 mg (MAXZIDE-25) 37.5-25 mg per tablet 1 tablet once daily.   Last reviewed on 6/2/2023 10:04 AM by Tonja KLEIN  Marck, NP    Review of patient's allergies indicates:   Allergen Reactions    Oranges [orange] Hives    Last reviewed on  6/2/2023 10:04 AM by Tonja Acharya      Tasks added this encounter   No tasks added.   Tasks due within next 3 months   No tasks due.     [facility]ana Mckenzie krist[facility] - Specialty Pharmacy  [address] [facility] [address]  Phone: [phone]  Fax: [phone]

## 2023-06-23 ENCOUNTER — TELEPHONE (OUTPATIENT)
Dept: PAIN MEDICINE | Facility: CLINIC | Age: 48
End: 2023-06-23
Payer: MEDICARE

## 2023-06-23 ENCOUNTER — INFUSION (OUTPATIENT)
Dept: INFUSION THERAPY | Facility: HOSPITAL | Age: 48
End: 2023-06-23
Attending: INTERNAL MEDICINE
Payer: MEDICARE

## 2023-06-23 ENCOUNTER — OFFICE VISIT (OUTPATIENT)
Dept: HEMATOLOGY/ONCOLOGY | Facility: CLINIC | Age: 48
End: 2023-06-23
Payer: MEDICARE

## 2023-06-23 VITALS
HEIGHT: 62 IN | SYSTOLIC BLOOD PRESSURE: 148 MMHG | HEART RATE: 63 BPM | RESPIRATION RATE: 18 BRPM | OXYGEN SATURATION: 99 % | BODY MASS INDEX: 44.51 KG/M2 | DIASTOLIC BLOOD PRESSURE: 93 MMHG | WEIGHT: 241.88 LBS | TEMPERATURE: 98 F

## 2023-06-23 DIAGNOSIS — R52 GENERALIZED PAIN: ICD-10-CM

## 2023-06-23 DIAGNOSIS — M06.09 RHEUMATOID ARTHRITIS OF MULTIPLE SITES WITH NEGATIVE RHEUMATOID FACTOR: Primary | ICD-10-CM

## 2023-06-23 DIAGNOSIS — D68.61 ANTIPHOSPHOLIPID ANTIBODY WITH HYPERCOAGULABLE STATE: Primary | ICD-10-CM

## 2023-06-23 PROCEDURE — 1159F PR MEDICATION LIST DOCUMENTED IN MEDICAL RECORD: ICD-10-PCS | Mod: CPTII,95,, | Performed by: NURSE PRACTITIONER

## 2023-06-23 PROCEDURE — 63600175 PHARM REV CODE 636 W HCPCS: Mod: JZ,JG | Performed by: INTERNAL MEDICINE

## 2023-06-23 PROCEDURE — 96365 THER/PROPH/DIAG IV INF INIT: CPT

## 2023-06-23 PROCEDURE — 4010F PR ACE/ARB THEARPY RXD/TAKEN: ICD-10-PCS | Mod: CPTII,95,, | Performed by: NURSE PRACTITIONER

## 2023-06-23 PROCEDURE — 1159F MED LIST DOCD IN RCRD: CPT | Mod: CPTII,95,, | Performed by: NURSE PRACTITIONER

## 2023-06-23 PROCEDURE — 99214 PR OFFICE/OUTPT VISIT, EST, LEVL IV, 30-39 MIN: ICD-10-PCS | Mod: 95,,, | Performed by: NURSE PRACTITIONER

## 2023-06-23 PROCEDURE — 99214 OFFICE O/P EST MOD 30 MIN: CPT | Mod: 95,,, | Performed by: NURSE PRACTITIONER

## 2023-06-23 PROCEDURE — 1160F RVW MEDS BY RX/DR IN RCRD: CPT | Mod: CPTII,95,, | Performed by: NURSE PRACTITIONER

## 2023-06-23 PROCEDURE — 25000003 PHARM REV CODE 250: Performed by: INTERNAL MEDICINE

## 2023-06-23 PROCEDURE — 4010F ACE/ARB THERAPY RXD/TAKEN: CPT | Mod: CPTII,95,, | Performed by: NURSE PRACTITIONER

## 2023-06-23 PROCEDURE — 1160F PR REVIEW ALL MEDS BY PRESCRIBER/CLIN PHARMACIST DOCUMENTED: ICD-10-PCS | Mod: CPTII,95,, | Performed by: NURSE PRACTITIONER

## 2023-06-23 RX ORDER — HEPARIN 100 UNIT/ML
500 SYRINGE INTRAVENOUS
Status: CANCELLED | OUTPATIENT
Start: 2023-07-21

## 2023-06-23 RX ORDER — HEPARIN 100 UNIT/ML
500 SYRINGE INTRAVENOUS
Status: DISCONTINUED | OUTPATIENT
Start: 2023-06-23 | End: 2023-06-23 | Stop reason: HOSPADM

## 2023-06-23 RX ORDER — SODIUM CHLORIDE 0.9 % (FLUSH) 0.9 %
10 SYRINGE (ML) INJECTION
Status: DISCONTINUED | OUTPATIENT
Start: 2023-06-23 | End: 2023-06-23 | Stop reason: HOSPADM

## 2023-06-23 RX ORDER — SODIUM CHLORIDE 0.9 % (FLUSH) 0.9 %
10 SYRINGE (ML) INJECTION
Status: CANCELLED | OUTPATIENT
Start: 2023-07-21

## 2023-06-23 RX ADMIN — TOCILIZUMAB 800 MG: 20 INJECTION, SOLUTION, CONCENTRATE INTRAVENOUS at 10:06

## 2023-06-23 NOTE — NURSING
Infusion # 10 - Acterma 8mg/kg q 4 weeks (max 800mg)  Last dose- 5/26/2023    Any:  -recent illness, infection, or antibiotic use in past week- denied  -open wounds or mouth sores- denied  -invasive procedures or surgeries in past 4 weeks or in upcoming 4 weeks- denied  -vaccinations in past week- denied  -any new symptoms/change in symptoms-not feeling well   -chance you may be pregnant- denied      Recent labs? 4/20/2023  Last Rheumatology provider visit- Seen by Dr LÓPEZ on 3/30/2023     Premeds-none needed     Actemra 800 mg administered IV at a 60 minute rate per orders; see MAR and vitals for more details. Tolerated well without adverse events, discharged and ambulatory out of clinic.

## 2023-06-23 NOTE — PROGRESS NOTES
The patient location is: car  The chief complaint leading to consultation is: generalized pain    Visit type: audiovisual    Face to Face time with patient: 20  45 minutes of total time spent on the encounter, which includes face to face time and non-face to face time preparing to see the patient (eg, review of tests), Obtaining and/or reviewing separately obtained history, Documenting clinical information in the electronic or other health record, Independently interpreting results (not separately reported) and communicating results to the patient/family/caregiver, or Care coordination (not separately reported).     Each patient to whom he or she provides medical services by telemedicine is:  (1) informed of the relationship between the physician and patient and the respective role of any other health care provider with respect to management of the patient; and (2) notified that he or she may decline to receive medical services by telemedicine and may withdraw from such care at any time.    Patient ID: Denise Flower is a 48 y.o. female.    Chief Complaint: generalized pain    HPI:   48 year old female followed in the hematology clinic for anticardiopid antibody +, DRVVT +, beta 2 glycoprotein + in the past.  Previously followed in the clinic by Dr. Saucedo.  Today is the first time I am evaluating/assessing th patient.  Has been taking ASA therapy daily.  Not currently on anticoagulation therapy.       States that she cannot remember if she had a blood clot in her leg before but thinks that she may have. States that all she can remember taking is aspirin.  Did have a miscarriage in the past in 2011.       Maternal Grandmother: stroke.  Was placed on coumadin     5/2023 mammogram - negative for malignancy  PAP done 7/2022 - negative for malignancy    Interval History:  6/23/2023  Presents today to discuss recent anticoag workup which was negative.  She denies smoking or hormonal therapy.  She states that she  has chronic pain and many bad days, but tries to move as much as she can.       Social History     Socioeconomic History    Marital status: Single   Tobacco Use    Smoking status: Never    Smokeless tobacco: Never   Substance and Sexual Activity    Alcohol use: Not Currently       Family History   Problem Relation Age of Onset    Hypertension Mother     Cancer Father         osiel       Past Surgical History:   Procedure Laterality Date     SECTION      TUBAL LIGATION         Past Medical History:   Diagnosis Date    Hypertension     Stroke     2010       Review of Systems   Constitutional:  Positive for fatigue.   Eyes: Negative.    Respiratory: Negative.     Cardiovascular: Negative.    Gastrointestinal: Negative.    Endocrine: Negative.    Genitourinary: Negative.    Musculoskeletal:  Positive for arthralgias and myalgias.   Skin: Negative.    Allergic/Immunologic: Negative.    Neurological: Negative.    Hematological: Negative.    Psychiatric/Behavioral:  Positive for sleep disturbance.         Medication List with Changes/Refills   Current Medications    ALBUTEROL (PROVENTIL/VENTOLIN HFA) 90 MCG/ACTUATION INHALER    2 puffs as needed.    AMLODIPINE-VALSARTAN (EXFORGE)  MG PER TABLET    Take 1 tablet by mouth once daily.    ASPIRIN 325 MG TABLET        ATORVASTATIN (LIPITOR) 10 MG TABLET        BUMETANIDE (BUMEX) 1 MG TABLET    Take 1 mg by mouth.    CHOLECALCIFEROL, VITAMIN D3, 1,250 MCG (50,000 UNIT) CAPSULE    TAKE 1 CAPSULE BY MOUTH ONCE A WEEK FOR VIT. D DEFICIENCY    CLONAZEPAM (KLONOPIN) 1 MG TABLET    TAKE 1 TABLET BY MOUTH ONCE DAILY AT BEDTIME AS NEEDED FOR ANXIETY SLEEP    CLONIDINE (CATAPRES) 0.2 MG TABLET    1 tablet 0.2 mg mid day for blood pressure flare up, cont 0.3 mg BID    CYCLOBENZAPRINE (FLEXERIL) 10 MG TABLET    Take 1 tablet (10 mg total) by mouth 3 (three) times daily as needed for Muscle spasms.    DULOXETINE (CYMBALTA) 20 MG CAPSULE    Take 20 mg by mouth once daily.     FEROSUL 325 MG (65 MG IRON) TAB TABLET    TAKE 2 TABLETS BY MOUTH DAILY FOR IRON DEFICIENCY    IPRATROPIUM (ATROVENT) 42 MCG (0.06 %) NASAL SPRAY    SPRAY 2 SPRAYS IN EACH NOSTRIL IN THE MORNING, 2 SPRAYS AT NOON, 2 SPRAYS IN THE EVENING, AND 2 SPRAYS BEFORE BEDTIME    MELOXICAM (MOBIC) 7.5 MG TABLET    1 tablet.    METHOTREXATE 2.5 MG TAB    as directed    METHYLPREDNISOLONE (MEDROL DOSEPACK) 4 MG TABLET    use as directed    MYCOPHENOLATE (CELLCEPT) 500 MG TAB    Take 2 tablets (1,000 mg total) by mouth 2 (two) times daily.    NEBIVOLOL (BYSTOLIC) 20 MG TAB        ONDANSETRON (ZOFRAN) 4 MG TABLET    Take 1 tablet (4 mg total) by mouth every 8 (eight) hours as needed for Nausea.    POTASSIUM CHLORIDE SA (K-DUR,KLOR-CON) 20 MEQ TABLET    Take 1 tablet (20 mEq total) by mouth 2 (two) times daily.    PREDNISONE (DELTASONE) 5 MG TABLET    Take 1 tablet (5 mg total) by mouth once daily.    PROMETHAZINE-DEXTROMETHORPHAN (PROMETHAZINE-DM) 6.25-15 MG/5 ML SYRP    5 ml as needed    SERTRALINE (ZOLOFT) 100 MG TABLET        TEMAZEPAM (RESTORIL) 15 MG CAP    30 mg.    TIZANIDINE (ZANAFLEX) 4 MG TABLET    TAKE 1 TO 2 TABLETS BY MOUTH THREE TIMES DAILY AS NEEDED FOR PAIN    TOBRAMYCIN-DEXAMETHASONE 0.3-0.1% (TOBRADEX) 0.3-0.1 % DRPS    1 drop into affected eye    TRIAMTERENE-HYDROCHLOROTHIAZIDE 37.5-25 MG (MAXZIDE-25) 37.5-25 MG PER TABLET    1 tablet once daily.        Objective:   There were no vitals filed for this visit.    Physical Exam  Constitutional:       Appearance: Normal appearance. She is obese.   Pulmonary:      Effort: Pulmonary effort is normal.   Neurological:      Mental Status: She is alert and oriented to person, place, and time.   Psychiatric:         Mood and Affect: Mood normal.         Behavior: Behavior normal.         Thought Content: Thought content normal.         Judgment: Judgment normal.       Assessment:     Problem List Items Addressed This Visit          Hematology    Antiphospholipid  antibody with hypercoagulable state - Primary    Relevant Orders    Beta-2 Glycoprotein Abs (IgA, IgG, IgM)    LUPUS ANTICOAGULANT (DRVVT)    ANTICARDIOLIPIN     Other Visit Diagnoses       Generalized pain        Relevant Orders    Ambulatory referral/consult to Pain Clinic            Lab Results   Component Value Date    WBC 8.06 04/20/2023    RBC 4.40 04/20/2023    HGB 13.1 04/20/2023    HCT 40.3 04/20/2023    MCV 92 04/20/2023    MCH 29.8 04/20/2023    MCHC 32.5 04/20/2023    RDW 13.0 04/20/2023     04/20/2023    MPV 11.7 04/20/2023    GRAN 3.5 04/20/2023    GRAN 43.5 04/20/2023    LYMPH 3.1 04/20/2023    LYMPH 38.2 04/20/2023    MONO 1.2 (H) 04/20/2023    MONO 15.3 (H) 04/20/2023    EOS 0.2 04/20/2023    BASO 0.04 04/20/2023    EOSINOPHIL 2.1 04/20/2023    BASOPHIL 0.5 04/20/2023      Lab Results   Component Value Date     (H) 04/20/2023    K 3.0 (L) 04/20/2023     04/20/2023    CO2 28 04/20/2023    BUN 23 (H) 04/20/2023    CREATININE 1.2 04/20/2023    CALCIUM 10.0 04/20/2023    ANIONGAP 12 04/20/2023    ESTGFRAFRICA 56 (A) 07/11/2022    EGFRNONAA 48 (A) 07/11/2022     Lab Results   Component Value Date    ALT 33 04/20/2023    AST 16 04/20/2023    ALKPHOS 79 04/20/2023    BILITOT 0.5 04/20/2023       Plan:   Antiphospholipid antibody with hypercoagulable state  -     Beta-2 Glycoprotein Abs (IgA, IgG, IgM); Future; Expected date: 06/23/2023  -     LUPUS ANTICOAGULANT (DRVVT); Future; Expected date: 06/23/2023  -     ANTICARDIOLIPIN; Future; Expected date: 06/23/2023    Generalized pain  -     Ambulatory referral/consult to Pain Clinic; Future; Expected date: 06/30/2023      Med Onc Chart Routing      Follow up with physician    Follow up with [facility] 6 months. f/u in 6 months with labs prior at [facility] and a VV after to discuss results   Infusion scheduling note   n/a   Injection scheduling note n/a   Labs   Scheduling:  Preferred lab: [facility]  Lab interval:  Drvvt, anticardiolipin  antibodies, beta 2 glycoprotein 10 days prior to next visit   Imaging   N/a   Pharmacy appointment No pharmacy appointment needed      Other referrals     Additional referrals needed  refer to pain management - fibromyalgia and generalized pain      DRVVT negative, anticardiolipin antibody negative, and beta 2 glycoprotein negative.    Per UpToDate - the association of the IgA isotypes with clinical thrombosis remains controversial [7]. We generally do not test for the IgA isotypes when evaluating for APS, and we generally do not consider these antibodies as supportive evidence for the diagnosis of APS if they are reported.     Collaborating Provider:  Dr. Fernando Kiser    Thank You,  Sabiha Acharya, FNP-C  Hematology Oncology

## 2023-07-15 ENCOUNTER — PATIENT MESSAGE (OUTPATIENT)
Dept: RHEUMATOLOGY | Facility: CLINIC | Age: 48
End: 2023-07-15
Payer: MEDICARE

## 2023-07-17 ENCOUNTER — SPECIALTY PHARMACY (OUTPATIENT)
Dept: PHARMACY | Facility: CLINIC | Age: 48
End: 2023-07-17
Payer: MEDICARE

## 2023-07-17 DIAGNOSIS — M31.7 MICROSCOPIC POLYANGIITIS: ICD-10-CM

## 2023-07-17 DIAGNOSIS — M06.09 RHEUMATOID ARTHRITIS OF MULTIPLE SITES WITH NEGATIVE RHEUMATOID FACTOR: ICD-10-CM

## 2023-07-17 RX ORDER — MYCOPHENOLATE MOFETIL 500 MG/1
1000 TABLET ORAL 2 TIMES DAILY
Qty: 120 TABLET | Refills: 3 | Status: ACTIVE | OUTPATIENT
Start: 2023-07-17 | End: 2023-11-17 | Stop reason: SDUPTHER

## 2023-07-17 NOTE — TELEPHONE ENCOUNTER
Outgoing call regarding cellcept refill; per pt, she has about 10 days on hand; informed her that a refill request was sent to md, and once approved [facility] will follow up to schedule delivery

## 2023-07-19 ENCOUNTER — PATIENT MESSAGE (OUTPATIENT)
Dept: RHEUMATOLOGY | Facility: CLINIC | Age: 48
End: 2023-07-19
Payer: MEDICARE

## 2023-07-19 DIAGNOSIS — I10 SEVERE UNCONTROLLED HYPERTENSION: Primary | ICD-10-CM

## 2023-07-20 NOTE — TELEPHONE ENCOUNTER
Specialty Pharmacy - Refill Coordination    Specialty Medication Orders Linked to Encounter      Flowsheet Row Most Recent Value   Medication #1 mycophenolate (CELLCEPT) 500 mg Tab (Order#[id number], Rx#[id number])            Refill Questions - Documented Responses      Flowsheet Row Most Recent Value   Patient Availability and HIPAA Verification    Does patient want to proceed with activity? Yes   HIPAA/medical authority confirmed? Yes   Relationship to patient of person spoken to? Self   Refill Screening Questions    Changes to allergies? No   Changes to medications? No   New conditions since last clinic visit? No   Unplanned office visit, urgent care, ED, or hospital admission in the last 4 weeks? No   How does patient/caregiver feel medication is working? Good   Financial problems or insurance changes? No   How many doses of your specialty medications were missed in the last 4 weeks? 0   Would patient like to speak to a pharmacist? No   When does the patient need to receive the medication? 07/28/23   Refill Delivery Questions    How will the patient receive the medication? Mail   When does the patient need to receive the medication? 07/28/23   Shipping Address Home   Address in [facility] confirmed and updated if neccessary? Yes   Expected Copay ($) 0   Is the patient able to afford the medication copay? Yes   Payment Method zero copay   Days supply of Refill 30   Supplies needed? No supplies needed   Refill activity completed? Yes   Refill activity plan Refill scheduled   Shipment/Pickup Date: 07/25/23            Current Outpatient Medications   Medication Sig    albuterol (PROVENTIL/VENTOLIN HFA) 90 mcg/actuation inhaler 2 puffs as needed.    amlodipine-valsartan (EXFORGE)  mg per tablet Take 1 tablet by mouth once daily.    aspirin 325 MG tablet     atorvastatin (LIPITOR) 10 MG tablet     bumetanide (BUMEX) 1 MG tablet Take 1 mg by mouth.    cholecalciferol, vitamin D3, 1,250 mcg (50,000 unit)  capsule TAKE 1 CAPSULE BY MOUTH ONCE A WEEK FOR VIT. D DEFICIENCY    clonazePAM (KLONOPIN) 1 MG tablet TAKE 1 TABLET BY MOUTH ONCE DAILY AT BEDTIME AS NEEDED FOR ANXIETY SLEEP    cloNIDine (CATAPRES) 0.2 MG tablet 1 tablet 0.2 mg mid day for blood pressure flare up, cont 0.3 mg BID    cyclobenzaprine (FLEXERIL) 10 MG tablet Take 1 tablet (10 mg total) by mouth 3 (three) times daily as needed for Muscle spasms.    DULoxetine (CYMBALTA) 20 MG capsule Take 20 mg by mouth once daily.    FEROSUL 325 mg (65 mg iron) Tab tablet TAKE 2 TABLETS BY MOUTH DAILY FOR IRON DEFICIENCY    ipratropium (ATROVENT) 42 mcg (0.06 %) nasal spray SPRAY 2 SPRAYS IN EACH NOSTRIL IN THE MORNING, 2 SPRAYS AT NOON, 2 SPRAYS IN THE EVENING, AND 2 SPRAYS BEFORE BEDTIME    meloxicam (MOBIC) 7.5 MG tablet 1 tablet.    methotrexate 2.5 MG Tab as directed    methylPREDNISolone (MEDROL DOSEPACK) 4 mg tablet use as directed (Patient not taking: Reported on 6/2/2023)    mycophenolate (CELLCEPT) 500 mg Tab Take 2 tablets (1,000 mg total) by mouth 2 (two) times daily.    nebivoloL (BYSTOLIC) 20 mg Tab     ondansetron (ZOFRAN) 4 MG tablet Take 1 tablet (4 mg total) by mouth every 8 (eight) hours as needed for Nausea.    potassium chloride SA (K-DUR,KLOR-CON) 20 MEQ tablet Take 1 tablet (20 mEq total) by mouth 2 (two) times daily.    predniSONE (DELTASONE) 5 MG tablet Take 1 tablet (5 mg total) by mouth once daily. (Patient not taking: Reported on 6/2/2023)    promethazine-dextromethorphan (PROMETHAZINE-DM) 6.25-15 mg/5 mL Syrp 5 ml as needed    sertraline (ZOLOFT) 100 MG tablet     temazepam (RESTORIL) 15 mg Cap 30 mg.    tiZANidine (ZANAFLEX) 4 MG tablet TAKE 1 TO 2 TABLETS BY MOUTH THREE TIMES DAILY AS NEEDED FOR PAIN    tobramycin-dexAMETHasone 0.3-0.1% (TOBRADEX) 0.3-0.1 % DrpS 1 drop into affected eye    triamterene-hydrochlorothiazide 37.5-25 mg (MAXZIDE-25) 37.5-25 mg per tablet 1 tablet once daily.   Last reviewed on 6/23/2023  9:44 AM by Juliette  SCOTT Burgess    Review of patient's allergies indicates:   Allergen Reactions    Oranges [orange] Hives    Last reviewed on  6/23/2023 9:44 AM by Juliette Burgess      Tasks added this encounter   No tasks added.   Tasks due within next 3 months   7/20/2023 - Refill Coordination Outreach (1 time occurrence)     Jolly Rico - Specialty Pharmacy  [facility] [address]  [facility] [address]  Phone: [phone]  Fax: [phone]

## 2023-07-21 ENCOUNTER — TELEPHONE (OUTPATIENT)
Dept: INFUSION THERAPY | Facility: HOSPITAL | Age: 48
End: 2023-07-21
Payer: MEDICARE

## 2023-07-28 ENCOUNTER — TELEPHONE (OUTPATIENT)
Dept: CARDIOLOGY | Facility: CLINIC | Age: 48
End: 2023-07-28
Payer: MEDICARE

## 2023-07-28 DIAGNOSIS — Z76.89 ENCOUNTER TO ESTABLISH CARE: Primary | ICD-10-CM

## 2023-07-30 NOTE — PROGRESS NOTES
"Subjective:   Patient ID:  Denise Flower is a 48 y.o. female who presents for evaluation of Hypertension        PT REFERRED BY DR. NEWTON BRAVO        HPI  Pt referred for uncontrolled HTN.  She has antiphospholipid ab syndrome, RA,  microscopic polyangiitis, chronic  pain, h/o CVA, HTN, hyperlipidemia.  F/u by Rheumatology for above, tx w Actemra infusion, Cellcept, h/o Cytoxan tx.  Sees Dr. Rodrigues, [facility], for HTN, last seen 1 month ago.  Ecg today 7/31/23 NSR, nonspecific T wave abn.  PCP stopped Exforge 5/23 due to edema in case Amlodipine was contributing.  Also was on Aldactone, Bumex.  Normal BNP on 12/22 labs.  Still has edema "everywhere".  Has dyspnea last month with fluid.  Has atypical CP, tightness, heaviness.  Few times.  Went to hospital and things checked out ok Pt [facility], per pt.        Per PCP note 5/23 Dr. Leslie:    History of present illness   Denise Flower is a 48 y.o. female who presents with continued concern about edema. She has weaned off the prednisone for a few months now. She takes aldactone 25 mg and bumex 1 mg twice a day. She continues to have diffuse swelling. Says it makes it hard for her to walk and get around. She uses her CPAP at night but still has some PND. She has not started any new medications. Patient denies any SOB or CP. No headaches or blurred vision. No abdominal pain, N/V/D/C.     Outpatient Medications Marked as Taking for the 5/16/23 encounter (Office Visit) with Brielle Leslie MD   Medication Sig Dispense Refill   atorvastatin (LIPITOR) 10 mg tablet Take 1 tablet by mouth once daily.   bumetanide (BUMEX) 1 mg tablet Take 1 tablet by mouth in the morning and 1 tablet before bedtime.   clonazePAM (KlonoPIN) 1 mg tablet Take 1 tablet by mouth in the morning and 1 tablet before bedtime.   cloNIDine (CATAPRES) 0.2 mg tablet Take 1 tablet by mouth 2 (two) times daily as needed. 180 tablet 1   cyclobenzaprine (FLEXERIL) 10 mg tablet Take 1 " tablet by mouth in the morning and 1 tablet at noon and 1 tablet before bedtime.   cyclophosphamide 200 mg/mL Solution Inject into the vein.   DULoxetine (CYMBALTA) 20 mg capsule Take 1 capsule by mouth in the morning. 30 capsule 11   fluconazole (DIFLUCAN) 150 mg tablet TAKE ONE TABLET BY MOUTH AS A ONE-TIME DOSE 1 tablet 0   ipratropium (ATROVENT) 42 mcg (0.06 %) nasal spray Take 2 sprays by Each Nostril route in the morning and 2 sprays at noon and 2 sprays in the evening and 2 sprays before bedtime. 15 mL 12   mesna (MESNEX) 100 mg/mL injection Inject into the vein once.   methotrexate 2.5 mg tablet Take 1 tablet by mouth.   mycophenolate (CELLCEPT) 500 mg tablet Take 2 tablets by mouth in the morning and 2 tablets in the evening.   nebivoloL (BYSTOLIC) 20 mg tablet Take 1 tablet by mouth in the morning.   potassium chloride (KLOR-CON M20) 20 mEq tablet TAKE 1 TABLET BY MOUTH IN THE MORNING AND 1 TABLET BEFORE BEDTIME 180 tablet 0   sertraline (ZOLOFT) 100 mg tablet Take 1 tablet by mouth in the morning.   spironolactone (Aldactone) 25 mg tablet Take 1 tablet by mouth in the morning for 180 days. 30 tablet 5   temazepam (RESTORIL) 30 mg capsule TAKE 1 CAPSULE BY MOUTH ONCE DAILY AT BEDTIME AS NEEDED   [DISCONTINUED] amlodipine-valsartan (EXFORGE)  mg per tablet Take 1 tablet by mouth once daily.   [DISCONTINUED] predniSONE (DELTASONE) 5 mg tablet Take 1 tablet by mouth in the morning.         Past Medical History:   Diagnosis Date    Antiphospholipid antibody with hypercoagulable state 11/10/2022    Hypertension     Microscopic polyangiitis 11/11/2020    Stroke     2010       Current Outpatient Medications:     albuterol (PROVENTIL/VENTOLIN HFA) 90 mcg/actuation inhaler, 2 puffs as needed., Disp: , Rfl:     aspirin 325 MG tablet, , Disp: , Rfl:     atorvastatin (LIPITOR) 10 MG tablet, , Disp: , Rfl:     bumetanide (BUMEX) 1 MG tablet, Take 1 mg by mouth 2 (two) times a day., Disp: , Rfl:      cholecalciferol, vitamin D3, 1,250 mcg (50,000 unit) capsule, TAKE 1 CAPSULE BY MOUTH ONCE A WEEK FOR VIT. D DEFICIENCY, Disp: , Rfl:     clonazePAM (KLONOPIN) 1 MG tablet, TAKE 1 TABLET BY MOUTH ONCE DAILY AT BEDTIME AS NEEDED FOR ANXIETY SLEEP, Disp: , Rfl:     cloNIDine (CATAPRES) 0.2 MG tablet, Take 0.2 mg by mouth 3 (three) times daily., Disp: , Rfl:     cyclobenzaprine (FLEXERIL) 10 MG tablet, Take 1 tablet (10 mg total) by mouth 3 (three) times daily as needed for Muscle spasms., Disp: 90 tablet, Rfl: 11    DULoxetine (CYMBALTA) 20 MG capsule, Take 20 mg by mouth once daily., Disp: , Rfl:     FEROSUL 325 mg (65 mg iron) Tab tablet, TAKE 2 TABLETS BY MOUTH DAILY FOR IRON DEFICIENCY, Disp: , Rfl:     ipratropium (ATROVENT) 42 mcg (0.06 %) nasal spray, SPRAY 2 SPRAYS IN EACH NOSTRIL IN THE MORNING, 2 SPRAYS AT NOON, 2 SPRAYS IN THE EVENING, AND 2 SPRAYS BEFORE BEDTIME, Disp: , Rfl:     meloxicam (MOBIC) 7.5 MG tablet, 1 tablet., Disp: , Rfl:     methotrexate 2.5 MG Tab, as directed, Disp: , Rfl:     methylPREDNISolone (MEDROL DOSEPACK) 4 mg tablet, use as directed, Disp: 21 tablet, Rfl: 0    mycophenolate (CELLCEPT) 500 mg Tab, Take 2 tablets (1,000 mg total) by mouth 2 (two) times daily., Disp: 120 tablet, Rfl: 3    nebivoloL (BYSTOLIC) 20 mg Tab, Take 20 mg by mouth 2 (two) times a day., Disp: , Rfl:     ondansetron (ZOFRAN) 4 MG tablet, Take 1 tablet (4 mg total) by mouth every 8 (eight) hours as needed for Nausea., Disp: 30 tablet, Rfl: 1    potassium chloride SA (K-DUR,KLOR-CON) 20 MEQ tablet, Take 1 tablet (20 mEq total) by mouth 2 (two) times daily., Disp: 60 tablet, Rfl: 6    predniSONE (DELTASONE) 5 MG tablet, Take 1 tablet (5 mg total) by mouth once daily., Disp: 90 tablet, Rfl: 1    promethazine-dextromethorphan (PROMETHAZINE-DM) 6.25-15 mg/5 mL Syrp, 5 ml as needed, Disp: , Rfl:     sertraline (ZOLOFT) 100 MG tablet, , Disp: , Rfl:     temazepam (RESTORIL) 15 mg Cap, 30 mg., Disp: , Rfl:      "tiZANidine (ZANAFLEX) 4 MG tablet, TAKE 1 TO 2 TABLETS BY MOUTH THREE TIMES DAILY AS NEEDED FOR PAIN, Disp: , Rfl:     tobramycin-dexAMETHasone 0.3-0.1% (TOBRADEX) 0.3-0.1 % DrpS, 1 drop into affected eye, Disp: , Rfl:     valsartan (DIOVAN) 160 MG tablet, Take 1 tablet (160 mg total) by mouth 2 (two) times daily., Disp: 60 tablet, Rfl: 3      Review of Systems   Constitutional: Positive for malaise/fatigue.   HENT: Negative.     Eyes: Negative.    Cardiovascular:  Positive for chest pain, dyspnea on exertion and leg swelling.   Respiratory:  Positive for shortness of breath.    Endocrine: Negative.    Hematologic/Lymphatic: Negative.    Skin: Negative.    Musculoskeletal:  Positive for arthritis, back pain, joint pain and stiffness.   Gastrointestinal: Negative.    Genitourinary: Negative.    Neurological: Negative.    Psychiatric/Behavioral: Negative.     Allergic/Immunologic: Negative.        BP (!) 162/108 (BP Location: Left arm, Patient Position: Sitting, BP Method: Large (Manual))   Pulse 70   Ht 5' 2" (1.575 m)   Wt 109.9 kg (242 lb 4.6 oz)   SpO2 98%   BMI 44.31 kg/m²     Wt Readings from Last 3 Encounters:   07/31/23 109.9 kg (242 lb 4.6 oz)   06/23/23 109.7 kg (241 lb 13.5 oz)   06/02/23 112.3 kg (247 lb 9.2 oz)     Temp Readings from Last 3 Encounters:   06/23/23 97.6 °F (36.4 °C)   06/02/23 97.8 °F (36.6 °C) (Temporal)   05/26/23 97 °F (36.1 °C)     BP Readings from Last 3 Encounters:   07/31/23 (!) 162/108   06/23/23 (!) 148/93   06/02/23 (!) 141/97     Pulse Readings from Last 3 Encounters:   07/31/23 70   06/23/23 63   06/02/23 69         Objective:   Physical Exam  Vitals and nursing note reviewed.   Constitutional:       General: She is not in acute distress.     Appearance: Normal appearance. She is well-developed. She is obese. She is not ill-appearing or diaphoretic.   HENT:      Head: Normocephalic.   Neck:      Thyroid: No thyromegaly.      Vascular: Normal carotid pulses. No carotid bruit " "or JVD.   Cardiovascular:      Rate and Rhythm: Normal rate and regular rhythm.      Pulses: Normal pulses.           Radial pulses are 2+ on the right side and 2+ on the left side.      Heart sounds: Normal heart sounds, S1 normal and S2 normal. No murmur heard.     No friction rub. No gallop.   Pulmonary:      Effort: Pulmonary effort is normal.      Breath sounds: Normal breath sounds. No wheezing or rales.   Abdominal:      General: Bowel sounds are normal. There is no abdominal bruit.      Palpations: Abdomen is soft.      Tenderness: There is no abdominal tenderness.   Musculoskeletal:      Cervical back: Neck supple.      Right lower leg: Edema present.      Left lower leg: Edema present.   Lymphadenopathy:      Cervical: No cervical adenopathy.   Skin:     General: Skin is warm.   Neurological:      Mental Status: She is alert and oriented to person, place, and time.   Psychiatric:         Behavior: Behavior normal. Behavior is cooperative.       I have reviewed all pertinent labs and cardiac studies.        Chemistry        Component Value Date/Time     (H) 04/20/2023 0802    K 3.0 (L) 04/20/2023 0802     04/20/2023 0802    CO2 28 04/20/2023 0802    BUN 23 (H) 04/20/2023 0802    CREATININE 1.2 04/20/2023 0802    GLU 90 04/20/2023 0802        Component Value Date/Time    CALCIUM 10.0 04/20/2023 0802    ALKPHOS 79 04/20/2023 0802    AST 16 04/20/2023 0802    ALT 33 04/20/2023 0802    BILITOT 0.5 04/20/2023 0802    ESTGFRAFRICA 56 (A) 07/11/2022 0741    EGFRNONAA 48 (A) 07/11/2022 0741        Lab Results   Component Value Date    WBC 8.06 04/20/2023    HGB 13.1 04/20/2023    HCT 40.3 04/20/2023    MCV 92 04/20/2023     04/20/2023     No results found for: "TSH"  No results found for: "LABA1C", "HGBA1C"    No results found for: "CHOL"  No results found for: "HDL"  No results found for: "LDLCALC"  No results found for: "DLDL"  No results found for: "TRIG"    f1 No results found for: " ""CHOLHDL"    No results found for this or any previous visit.            Assessment:      1. Primary hypertension    2. Antiphospholipid antibody with hypercoagulable state    3. History of stroke    4. Hypercholesterolemia    5. Severe uncontrolled hypertension    6. Atypical chest pain    7. Edema of both legs    8. Abnormal ECG    9. Class 3 severe obesity due to excess calories with serious comorbidity and body mass index (BMI) of 40.0 to 44.9 in adult        Plan:       Pt has been seeing a lot of doctors for her BP control, PCP and Cardiologist at [facility], recently.  Discussed her condition.  Add back Valsartan, 160 mg bid.  Exforge (Amlodipine) was stopped few months ago, did not help w edema.  Home BP monitoring advised.  Goal < 130/80.  Low salt diet.  Continue other HTN meds.  Assess LV function with echocardiogram.  CMP, BNP.  Consider increasing Bumex dose as tolerated or adding Aldactone back (was on in past) after reviewing labs.  She will continue to see her PCP for HTN mgt, and is advised to see her in next 4 - 8 weeks.  She has appt with Dr. Rodrigues, [facility], in 2.5 weeks and will keep that appt as scheduled.  There would be too many doctors managing/changing her HTN meds otherwise so will defer to above doctors unless she changes to [facility].  She would benefit from a stress test in future for h/o atypical CP once BP is better controlled and will discuss w primary cardiologist, Dr. Rodrigues.  To ER for any acute sxs.  Weight loss needed.  F/u w Rheumatology as scheduled.    Phone review for above test results.        I have reviewed all pertinent labs and cardiac studies independently. Plans and recommendations have been formulated under my direct supervision. All questions answered and patient voiced understanding.     "

## 2023-07-31 ENCOUNTER — HOSPITAL ENCOUNTER (OUTPATIENT)
Dept: CARDIOLOGY | Facility: HOSPITAL | Age: 48
Discharge: HOME | End: 2023-07-31
Attending: INTERNAL MEDICINE
Payer: MEDICARE

## 2023-07-31 ENCOUNTER — OFFICE VISIT (OUTPATIENT)
Dept: CARDIOLOGY | Facility: CLINIC | Age: 48
End: 2023-07-31
Payer: MEDICARE

## 2023-07-31 VITALS
SYSTOLIC BLOOD PRESSURE: 162 MMHG | BODY MASS INDEX: 44.59 KG/M2 | OXYGEN SATURATION: 98 % | HEART RATE: 70 BPM | WEIGHT: 242.31 LBS | HEIGHT: 62 IN | DIASTOLIC BLOOD PRESSURE: 108 MMHG

## 2023-07-31 DIAGNOSIS — Z86.73 HISTORY OF STROKE: ICD-10-CM

## 2023-07-31 DIAGNOSIS — E78.00 HYPERCHOLESTEROLEMIA: ICD-10-CM

## 2023-07-31 DIAGNOSIS — Z76.89 ENCOUNTER TO ESTABLISH CARE: ICD-10-CM

## 2023-07-31 DIAGNOSIS — R60.0 EDEMA OF BOTH LEGS: ICD-10-CM

## 2023-07-31 DIAGNOSIS — R07.89 ATYPICAL CHEST PAIN: ICD-10-CM

## 2023-07-31 DIAGNOSIS — D68.61 ANTIPHOSPHOLIPID ANTIBODY WITH HYPERCOAGULABLE STATE: ICD-10-CM

## 2023-07-31 DIAGNOSIS — I10 SEVERE UNCONTROLLED HYPERTENSION: ICD-10-CM

## 2023-07-31 DIAGNOSIS — E66.01 CLASS 3 SEVERE OBESITY DUE TO EXCESS CALORIES WITH SERIOUS COMORBIDITY AND BODY MASS INDEX (BMI) OF 40.0 TO 44.9 IN ADULT: ICD-10-CM

## 2023-07-31 DIAGNOSIS — R94.31 ABNORMAL ECG: ICD-10-CM

## 2023-07-31 DIAGNOSIS — I10 PRIMARY HYPERTENSION: Primary | ICD-10-CM

## 2023-07-31 PROBLEM — E66.813 CLASS 3 SEVERE OBESITY DUE TO EXCESS CALORIES WITH SERIOUS COMORBIDITY AND BODY MASS INDEX (BMI) OF 40.0 TO 44.9 IN ADULT: Status: ACTIVE | Noted: 2023-07-31

## 2023-07-31 PROCEDURE — 3008F PR BODY MASS INDEX (BMI) DOCUMENTED: ICD-10-PCS | Mod: CPTII,S$GLB,, | Performed by: INTERNAL MEDICINE

## 2023-07-31 PROCEDURE — 99205 OFFICE O/P NEW HI 60 MIN: CPT | Mod: S$GLB,,, | Performed by: INTERNAL MEDICINE

## 2023-07-31 PROCEDURE — 4010F PR ACE/ARB THEARPY RXD/TAKEN: ICD-10-PCS | Mod: CPTII,S$GLB,, | Performed by: INTERNAL MEDICINE

## 2023-07-31 PROCEDURE — 3077F SYST BP >= 140 MM HG: CPT | Mod: CPTII,S$GLB,, | Performed by: INTERNAL MEDICINE

## 2023-07-31 PROCEDURE — 1160F RVW MEDS BY RX/DR IN RCRD: CPT | Mod: CPTII,S$GLB,, | Performed by: INTERNAL MEDICINE

## 2023-07-31 PROCEDURE — 3008F BODY MASS INDEX DOCD: CPT | Mod: CPTII,S$GLB,, | Performed by: INTERNAL MEDICINE

## 2023-07-31 PROCEDURE — 99205 PR OFFICE/OUTPT VISIT, NEW, LEVL V, 60-74 MIN: ICD-10-PCS | Mod: S$GLB,,, | Performed by: INTERNAL MEDICINE

## 2023-07-31 PROCEDURE — 93010 ELECTROCARDIOGRAM REPORT: CPT | Mod: ,,, | Performed by: INTERNAL MEDICINE

## 2023-07-31 PROCEDURE — 3080F DIAST BP >= 90 MM HG: CPT | Mod: CPTII,S$GLB,, | Performed by: INTERNAL MEDICINE

## 2023-07-31 PROCEDURE — 99999 PR PBB SHADOW E&M-EST. PATIENT-LVL V: ICD-10-PCS | Mod: PBBFAC,,, | Performed by: INTERNAL MEDICINE

## 2023-07-31 PROCEDURE — 93005 ELECTROCARDIOGRAM TRACING: CPT

## 2023-07-31 PROCEDURE — 99999 PR PBB SHADOW E&M-EST. PATIENT-LVL V: CPT | Mod: PBBFAC,,, | Performed by: INTERNAL MEDICINE

## 2023-07-31 PROCEDURE — 4010F ACE/ARB THERAPY RXD/TAKEN: CPT | Mod: CPTII,S$GLB,, | Performed by: INTERNAL MEDICINE

## 2023-07-31 PROCEDURE — 1159F PR MEDICATION LIST DOCUMENTED IN MEDICAL RECORD: ICD-10-PCS | Mod: CPTII,S$GLB,, | Performed by: INTERNAL MEDICINE

## 2023-07-31 PROCEDURE — 1160F PR REVIEW ALL MEDS BY PRESCRIBER/CLIN PHARMACIST DOCUMENTED: ICD-10-PCS | Mod: CPTII,S$GLB,, | Performed by: INTERNAL MEDICINE

## 2023-07-31 PROCEDURE — 1159F MED LIST DOCD IN RCRD: CPT | Mod: CPTII,S$GLB,, | Performed by: INTERNAL MEDICINE

## 2023-07-31 PROCEDURE — 3077F PR MOST RECENT SYSTOLIC BLOOD PRESSURE >= 140 MM HG: ICD-10-PCS | Mod: CPTII,S$GLB,, | Performed by: INTERNAL MEDICINE

## 2023-07-31 PROCEDURE — 3080F PR MOST RECENT DIASTOLIC BLOOD PRESSURE >= 90 MM HG: ICD-10-PCS | Mod: CPTII,S$GLB,, | Performed by: INTERNAL MEDICINE

## 2023-07-31 PROCEDURE — 93010 EKG 12-LEAD: ICD-10-PCS | Mod: ,,, | Performed by: INTERNAL MEDICINE

## 2023-07-31 RX ORDER — VALSARTAN 160 MG/1
160 TABLET ORAL 2 TIMES DAILY
Qty: 60 TABLET | Refills: 3 | Status: SHIPPED | OUTPATIENT
Start: 2023-07-31 | End: 2024-07-30

## 2023-08-04 ENCOUNTER — TELEPHONE (OUTPATIENT)
Dept: CARDIOLOGY | Facility: CLINIC | Age: 48
End: 2023-08-04
Payer: MEDICARE

## 2023-08-04 ENCOUNTER — HOSPITAL ENCOUNTER (OUTPATIENT)
Dept: CARDIOLOGY | Facility: HOSPITAL | Age: 48
Discharge: HOME | End: 2023-08-04
Attending: INTERNAL MEDICINE
Payer: MEDICARE

## 2023-08-04 VITALS — BODY MASS INDEX: 44.53 KG/M2 | WEIGHT: 242 LBS | HEIGHT: 62 IN

## 2023-08-04 DIAGNOSIS — I10 SEVERE UNCONTROLLED HYPERTENSION: ICD-10-CM

## 2023-08-04 DIAGNOSIS — R60.0 EDEMA OF BOTH LEGS: ICD-10-CM

## 2023-08-04 DIAGNOSIS — Z86.73 HISTORY OF STROKE: ICD-10-CM

## 2023-08-04 DIAGNOSIS — D68.61 ANTIPHOSPHOLIPID ANTIBODY WITH HYPERCOAGULABLE STATE: ICD-10-CM

## 2023-08-04 DIAGNOSIS — R07.89 ATYPICAL CHEST PAIN: ICD-10-CM

## 2023-08-04 DIAGNOSIS — I10 PRIMARY HYPERTENSION: ICD-10-CM

## 2023-08-04 LAB
AORTIC ROOT ANNULUS: 3.68 CM
AV INDEX (PROSTH): 0.87
AV MEAN GRADIENT: 4 MMHG
AV PEAK GRADIENT: 7 MMHG
AV VALVE AREA BY VELOCITY RATIO: 2.03 CM²
AV VALVE AREA: 2.7 CM²
AV VELOCITY RATIO: 0.65
BSA FOR ECHO PROCEDURE: 2.19 M2
CV ECHO LV RWT: 0.54 CM
DOP CALC AO PEAK VEL: 1.35 M/S
DOP CALC AO VTI: 21.8 CM
DOP CALC LVOT AREA: 3.1 CM2
DOP CALC LVOT DIAMETER: 1.99 CM
DOP CALC LVOT PEAK VEL: 0.88 M/S
DOP CALC LVOT STROKE VOLUME: 58.75 CM3
DOP CALC MV VTI: 13.3 CM
DOP CALC RVOT PEAK VEL: 0.52 M/S
DOP CALC RVOT VTI: 14.5 CM
DOP CALCLVOT PEAK VEL VTI: 18.9 CM
E WAVE DECELERATION TIME: 225.8 MSEC
E/A RATIO: 0.93
E/E' RATIO: 9.82 M/S
ECHO LV POSTERIOR WALL: 1.31 CM (ref 0.6–1.1)
FRACTIONAL SHORTENING: 24 % (ref 28–44)
INTERVENTRICULAR SEPTUM: 1.46 CM (ref 0.6–1.1)
IVRT: 114.18 MSEC
LA MAJOR: 5.23 CM
LA MINOR: 4.49 CM
LA WIDTH: 3.2 CM
LEFT ATRIUM SIZE: 3.97 CM
LEFT ATRIUM VOLUME INDEX MOD: 25.3 ML/M2
LEFT ATRIUM VOLUME INDEX: 25.2 ML/M2
LEFT ATRIUM VOLUME MOD: 52.41 CM3
LEFT ATRIUM VOLUME: 52.18 CM3
LEFT INTERNAL DIMENSION IN SYSTOLE: 3.7 CM (ref 2.1–4)
LEFT VENTRICLE DIASTOLIC VOLUME INDEX: 54.34 ML/M2
LEFT VENTRICLE DIASTOLIC VOLUME: 112.48 ML
LEFT VENTRICLE MASS INDEX: 134 G/M2
LEFT VENTRICLE SYSTOLIC VOLUME INDEX: 28 ML/M2
LEFT VENTRICLE SYSTOLIC VOLUME: 58.03 ML
LEFT VENTRICULAR INTERNAL DIMENSION IN DIASTOLE: 4.89 CM (ref 3.5–6)
LEFT VENTRICULAR MASS: 277.26 G
LV LATERAL E/E' RATIO: 10.8 M/S
LV SEPTAL E/E' RATIO: 9 M/S
LVOT MG: 1.4 MMHG
LVOT MV: 0.55 CM/S
MV MEAN GRADIENT: 0 MMHG
MV PEAK A VEL: 0.58 M/S
MV PEAK E VEL: 0.54 M/S
MV PEAK GRADIENT: 1 MMHG
MV VALVE AREA BY CONTINUITY EQUATION: 4.42 CM2
PV MEAN GRADIENT: 1 MMHG
RA MAJOR: 4.63 CM
RA PRESSURE ESTIMATED: 3 MMHG
RA WIDTH: 3.16 CM
RIGHT VENTRICULAR END-DIASTOLIC DIMENSION: 2.69 CM
SINUS: 2.88 CM
STJ: 2.65 CM
TDI LATERAL: 0.05 M/S
TDI SEPTAL: 0.06 M/S
TDI: 0.06 M/S
Z-SCORE OF LEFT VENTRICULAR DIMENSION IN END DIASTOLE: -2.52
Z-SCORE OF LEFT VENTRICULAR DIMENSION IN END SYSTOLE: -0.32

## 2023-08-04 PROCEDURE — 93306 TTE W/DOPPLER COMPLETE: CPT | Mod: 26,,, | Performed by: INTERNAL MEDICINE

## 2023-08-04 PROCEDURE — 93306 ECHO (CUPID ONLY): ICD-10-PCS | Mod: 26,,, | Performed by: INTERNAL MEDICINE

## 2023-08-04 PROCEDURE — 93306 TTE W/DOPPLER COMPLETE: CPT

## 2023-08-04 NOTE — TELEPHONE ENCOUNTER
Please call pt and advise her to go to [facility] ER asap today for potassium replacement with IV.  Potassium severely low.    Lise Gonzalez MA 3 minutes ago (3:09 PM)     [facility] main lab called with critical lab for potassium 2.6          Spoke to patient verbalized understanding states she will go asap

## 2023-08-04 NOTE — TELEPHONE ENCOUNTER
Please call pt and advise her to go to [facility] ER asap today for potassium replacement with IV.  Potassium severely low.    Lise Gonzalez MA 3 minutes ago (3:09 PM)     [facility] main lab called with critical lab for potassium 2.6

## 2023-08-08 ENCOUNTER — TELEPHONE (OUTPATIENT)
Dept: CARDIOLOGY | Facility: CLINIC | Age: 48
End: 2023-08-08
Payer: MEDICARE

## 2023-08-08 DIAGNOSIS — E87.6 HYPOKALEMIA: Primary | ICD-10-CM

## 2023-08-08 NOTE — TELEPHONE ENCOUNTER
Please call pt.  Did she go to the ER last week for her severely low potassium as advised?    Dr Muniz    Spoke to patient states that she did not go to the ED like she was advised to go but says she had to stay with her mom because she feel sick and she doubled up her potassium and ate a lot stuff with potassium in it.

## 2023-08-08 NOTE — TELEPHONE ENCOUNTER
Please call pt.  Did she go to the ER last week for her severely low potassium as advised?    Dr Muniz

## 2023-08-09 ENCOUNTER — LAB VISIT (OUTPATIENT)
Dept: LAB | Facility: HOSPITAL | Age: 48
End: 2023-08-09
Attending: INTERNAL MEDICINE
Payer: MEDICARE

## 2023-08-09 DIAGNOSIS — E87.6 HYPOKALEMIA: ICD-10-CM

## 2023-08-09 PROCEDURE — 80053 COMPREHEN METABOLIC PANEL: CPT | Performed by: INTERNAL MEDICINE

## 2023-08-09 PROCEDURE — 36415 COLL VENOUS BLD VENIPUNCTURE: CPT | Performed by: INTERNAL MEDICINE

## 2023-08-09 NOTE — TELEPHONE ENCOUNTER
Please call pt and schedule CMP asap this week to recheck her electrolytes.    Dr Muniz      Called patient to advise per dr. Muniz spoke to patient repeat labs scheduled

## 2023-08-10 ENCOUNTER — TELEPHONE (OUTPATIENT)
Dept: CARDIOLOGY | Facility: CLINIC | Age: 48
End: 2023-08-10
Payer: MEDICARE

## 2023-08-10 ENCOUNTER — DOCUMENTATION ONLY (OUTPATIENT)
Dept: CARDIOLOGY | Facility: CLINIC | Age: 48
End: 2023-08-10
Payer: MEDICARE

## 2023-08-10 LAB
ALBUMIN SERPL BCP-MCNC: 3.3 G/DL (ref 3.5–5.2)
ALP SERPL-CCNC: 71 U/L (ref 55–135)
ALT SERPL W/O P-5'-P-CCNC: 19 U/L (ref 10–44)
ANION GAP SERPL CALC-SCNC: 13 MMOL/L (ref 8–16)
AST SERPL-CCNC: 17 U/L (ref 10–40)
BILIRUB SERPL-MCNC: 0.6 MG/DL (ref 0.1–1)
BUN SERPL-MCNC: 8 MG/DL (ref 6–20)
CALCIUM SERPL-MCNC: 8.4 MG/DL (ref 8.7–10.5)
CHLORIDE SERPL-SCNC: 103 MMOL/L (ref 95–110)
CO2 SERPL-SCNC: 27 MMOL/L (ref 23–29)
CREAT SERPL-MCNC: 1.1 MG/DL (ref 0.5–1.4)
EST. GFR  (NO RACE VARIABLE): >60 ML/MIN/1.73 M^2
GLUCOSE SERPL-MCNC: 88 MG/DL (ref 70–110)
POTASSIUM SERPL-SCNC: 2.7 MMOL/L (ref 3.5–5.1)
PROT SERPL-MCNC: 6.9 G/DL (ref 6–8.4)
SODIUM SERPL-SCNC: 143 MMOL/L (ref 136–145)

## 2023-08-10 NOTE — PROGRESS NOTES
Called by the lab at 00:15 am for labs done at 10:30 am with a critical potassium of 2.7.   Called patient she is asymptomatic explained to her the situation and the option of going to er to get iv potassium vs taking 80 meq of potassium now at home. She will take 80 meq potassium. In am she is going to her pcp she will have repeat potassium and magnesium.

## 2023-08-10 NOTE — TELEPHONE ENCOUNTER
Calli Khan MD P [facility] Brian P Staff  This patient had again a critically low potassium.   I gave her 80 meq potassium stat at midnite. Make sure she is taken care off and followed up upon.   She is seeing her pcp this moring   DR Khan     Spoke to patient states that she took her potassium and will recheck it this morning when she see's her pcp.

## 2023-08-16 ENCOUNTER — TELEPHONE (OUTPATIENT)
Dept: CARDIOLOGY | Facility: CLINIC | Age: 48
End: 2023-08-16
Payer: MEDICARE

## 2023-08-16 NOTE — TELEPHONE ENCOUNTER
Please call pt.  She was supposed to see her PCP last week to address her low potassium issues.  Has she seen her PCP for this issue?    Dr Muniz

## 2023-08-17 ENCOUNTER — SPECIALTY PHARMACY (OUTPATIENT)
Dept: PHARMACY | Facility: CLINIC | Age: 48
End: 2023-08-17
Payer: MEDICARE

## 2023-08-17 NOTE — TELEPHONE ENCOUNTER
Please call pt.  She was supposed to see her PCP last week to address her low potassium issues.  Has she seen her PCP for this issue?    Dr Muniz    Spoke to patient states that yes she followed up with her pcp says she yao by her taking the amount of diuretics she was taking is what caused her low potassium. Advised her to start taking spironolactone 2 times a day her potassium was 3.3       Patient had additional questions and concerns stating her BP is not controlled she is taking her medication as prescribed but it's still high this morning it was 190/117 states her PCP recommends Dr. Muniz take over BP meds

## 2023-08-17 NOTE — TELEPHONE ENCOUNTER
Specialty Pharmacy - Refill Coordination    Specialty Medication Orders Linked to Encounter      Flowsheet Row Most Recent Value   Medication #1 mycophenolate (CELLCEPT) 500 mg Tab (Order#[id number], Rx#[id number])            Refill Questions - Documented Responses      Flowsheet Row Most Recent Value   Patient Availability and HIPAA Verification    Does patient want to proceed with activity? Yes   HIPAA/medical authority confirmed? Yes   Relationship to patient of person spoken to? Self   Refill Screening Questions    Changes to allergies? No   Changes to medications? No   New conditions since last clinic visit? No   Unplanned office visit, urgent care, ED, or hospital admission in the last 4 weeks? No   How does patient/caregiver feel medication is working? Good   Financial problems or insurance changes? No   How many doses of your specialty medications were missed in the last 4 weeks? 0   Would patient like to speak to a pharmacist? No   When does the patient need to receive the medication? 08/20/23   Refill Delivery Questions    How will the patient receive the medication? Mail   When does the patient need to receive the medication? 08/20/23   Shipping Address Home   Address in [facility] confirmed and updated if neccessary? Yes   Expected Copay ($) 0   Is the patient able to afford the medication copay? Yes   Payment Method zero copay   Days supply of Refill 30   Supplies needed? No supplies needed   Refill activity completed? Yes   Refill activity plan Refill scheduled   Shipment/Pickup Date: 08/17/23            Current Outpatient Medications   Medication Sig    albuterol (PROVENTIL/VENTOLIN HFA) 90 mcg/actuation inhaler 2 puffs as needed.    aspirin 325 MG tablet     atorvastatin (LIPITOR) 10 MG tablet     bumetanide (BUMEX) 1 MG tablet Take 1 mg by mouth 2 (two) times a day.    cholecalciferol, vitamin D3, 1,250 mcg (50,000 unit) capsule TAKE 1 CAPSULE BY MOUTH ONCE A WEEK FOR VIT. D DEFICIENCY     clonazePAM (KLONOPIN) 1 MG tablet TAKE 1 TABLET BY MOUTH ONCE DAILY AT BEDTIME AS NEEDED FOR ANXIETY SLEEP    cloNIDine (CATAPRES) 0.2 MG tablet Take 0.2 mg by mouth 3 (three) times daily.    cyclobenzaprine (FLEXERIL) 10 MG tablet Take 1 tablet (10 mg total) by mouth 3 (three) times daily as needed for Muscle spasms.    DULoxetine (CYMBALTA) 20 MG capsule Take 20 mg by mouth once daily.    FEROSUL 325 mg (65 mg iron) Tab tablet TAKE 2 TABLETS BY MOUTH DAILY FOR IRON DEFICIENCY    ipratropium (ATROVENT) 42 mcg (0.06 %) nasal spray SPRAY 2 SPRAYS IN EACH NOSTRIL IN THE MORNING, 2 SPRAYS AT NOON, 2 SPRAYS IN THE EVENING, AND 2 SPRAYS BEFORE BEDTIME    meloxicam (MOBIC) 7.5 MG tablet 1 tablet.    methotrexate 2.5 MG Tab as directed    methylPREDNISolone (MEDROL DOSEPACK) 4 mg tablet use as directed    mycophenolate (CELLCEPT) 500 mg Tab Take 2 tablets (1,000 mg total) by mouth 2 (two) times daily.    nebivoloL (BYSTOLIC) 20 mg Tab Take 20 mg by mouth 2 (two) times a day.    ondansetron (ZOFRAN) 4 MG tablet Take 1 tablet (4 mg total) by mouth every 8 (eight) hours as needed for Nausea.    potassium chloride SA (K-DUR,KLOR-CON) 20 MEQ tablet Take 1 tablet (20 mEq total) by mouth 2 (two) times daily.    predniSONE (DELTASONE) 5 MG tablet Take 1 tablet (5 mg total) by mouth once daily.    promethazine-dextromethorphan (PROMETHAZINE-DM) 6.25-15 mg/5 mL Syrp 5 ml as needed    sertraline (ZOLOFT) 100 MG tablet     temazepam (RESTORIL) 15 mg Cap 30 mg.    tiZANidine (ZANAFLEX) 4 MG tablet TAKE 1 TO 2 TABLETS BY MOUTH THREE TIMES DAILY AS NEEDED FOR PAIN    tobramycin-dexAMETHasone 0.3-0.1% (TOBRADEX) 0.3-0.1 % DrpS 1 drop into affected eye    valsartan (DIOVAN) 160 MG tablet Take 1 tablet (160 mg total) by mouth 2 (two) times daily.   Last reviewed on 7/31/2023 10:05 AM by Wisam Muniz MD    Review of patient's allergies indicates:   Allergen Reactions    Oranges [orange] Hives    Last reviewed on  7/31/2023 10:05 AM by  Wisam Muniz      Tasks added this encounter   No tasks added.   Tasks due within next 3 months   No tasks due.     Tana Mckenzie [facility]  [address] [facility] [address]  Phone: [phone]  Fax: [phone]

## 2023-08-18 ENCOUNTER — INFUSION (OUTPATIENT)
Dept: INFUSION THERAPY | Facility: HOSPITAL | Age: 48
End: 2023-08-18
Attending: INTERNAL MEDICINE
Payer: MEDICARE

## 2023-08-18 VITALS
TEMPERATURE: 98 F | HEART RATE: 70 BPM | OXYGEN SATURATION: 98 % | BODY MASS INDEX: 43.95 KG/M2 | SYSTOLIC BLOOD PRESSURE: 161 MMHG | DIASTOLIC BLOOD PRESSURE: 101 MMHG | RESPIRATION RATE: 16 BRPM | WEIGHT: 240.31 LBS

## 2023-08-18 DIAGNOSIS — M06.09 RHEUMATOID ARTHRITIS OF MULTIPLE SITES WITH NEGATIVE RHEUMATOID FACTOR: Primary | ICD-10-CM

## 2023-08-18 PROCEDURE — 63600175 PHARM REV CODE 636 W HCPCS: Mod: JZ,JG | Performed by: INTERNAL MEDICINE

## 2023-08-18 PROCEDURE — 96365 THER/PROPH/DIAG IV INF INIT: CPT

## 2023-08-18 PROCEDURE — 25000003 PHARM REV CODE 250: Performed by: INTERNAL MEDICINE

## 2023-08-18 RX ORDER — SODIUM CHLORIDE 0.9 % (FLUSH) 0.9 %
10 SYRINGE (ML) INJECTION
Status: CANCELLED | OUTPATIENT
Start: 2023-09-15

## 2023-08-18 RX ORDER — HEPARIN 100 UNIT/ML
500 SYRINGE INTRAVENOUS
Status: CANCELLED | OUTPATIENT
Start: 2023-09-15

## 2023-08-18 RX ADMIN — TOCILIZUMAB 800 MG: 20 INJECTION, SOLUTION, CONCENTRATE INTRAVENOUS at 09:08

## 2023-08-18 NOTE — PLAN OF CARE
Plan of care reviewed with patient. Discussed if there are any new or ongoing concerns. Denies.    Problem: Adult Inpatient Plan of Care  Goal: Plan of Care Review  Outcome: Ongoing, Progressing  Goal: Patient-Specific Goal (Individualized)  Outcome: Ongoing, Progressing  Goal: Absence of Hospital-Acquired Illness or Injury  Outcome: Ongoing, Progressing  Intervention: Identify and Manage Fall Risk  Flowsheets (Taken 8/18/2023 0956)  Safety Promotion/Fall Prevention: in recliner, wheels locked  Goal: Optimal Comfort and Wellbeing  Outcome: Ongoing, Progressing  Intervention: Provide Person-Centered Care  Flowsheets (Taken 8/18/2023 0956)  Trust Relationship/Rapport:   care explained   reassurance provided   choices provided   thoughts/feelings acknowledged   emotional support provided   empathic listening provided   questions answered   questions encouraged

## 2023-08-18 NOTE — NURSING
Pt called (8/17)  to report BP has been 180s/110s for the last 2 weeks and verify if its ok to receive actemra (8/18). Ok per Dr. LÓPEZ to proceed with getting actemra.

## 2023-08-23 DIAGNOSIS — M31.7 MICROSCOPIC POLYANGIITIS: ICD-10-CM

## 2023-08-23 RX ORDER — PREDNISONE 5 MG/1
5 TABLET ORAL
Qty: 90 TABLET | Refills: 1 | Status: SHIPPED | OUTPATIENT
Start: 2023-08-23 | End: 2023-10-19 | Stop reason: ALTCHOICE

## 2023-09-06 DIAGNOSIS — M79.7 FIBROMYALGIA: ICD-10-CM

## 2023-09-07 RX ORDER — CYCLOBENZAPRINE HCL 10 MG
10 TABLET ORAL
Qty: 90 TABLET | Refills: 11 | Status: SHIPPED | OUTPATIENT
Start: 2023-09-07

## 2023-09-11 ENCOUNTER — PATIENT MESSAGE (OUTPATIENT)
Dept: PHARMACY | Facility: CLINIC | Age: 48
End: 2023-09-11
Payer: MEDICARE

## 2023-09-15 ENCOUNTER — INFUSION (OUTPATIENT)
Dept: INFUSION THERAPY | Facility: HOSPITAL | Age: 48
End: 2023-09-15
Attending: INTERNAL MEDICINE
Payer: MEDICARE

## 2023-09-15 VITALS
OXYGEN SATURATION: 98 % | WEIGHT: 238.31 LBS | DIASTOLIC BLOOD PRESSURE: 74 MMHG | BODY MASS INDEX: 43.59 KG/M2 | HEART RATE: 62 BPM | SYSTOLIC BLOOD PRESSURE: 109 MMHG | TEMPERATURE: 97 F | RESPIRATION RATE: 16 BRPM

## 2023-09-15 DIAGNOSIS — M06.09 RHEUMATOID ARTHRITIS OF MULTIPLE SITES WITH NEGATIVE RHEUMATOID FACTOR: Primary | ICD-10-CM

## 2023-09-15 PROCEDURE — 63600175 PHARM REV CODE 636 W HCPCS: Mod: JZ,JG | Performed by: INTERNAL MEDICINE

## 2023-09-15 PROCEDURE — 96365 THER/PROPH/DIAG IV INF INIT: CPT

## 2023-09-15 PROCEDURE — 25000003 PHARM REV CODE 250: Performed by: INTERNAL MEDICINE

## 2023-09-15 RX ORDER — SODIUM CHLORIDE 0.9 % (FLUSH) 0.9 %
10 SYRINGE (ML) INJECTION
Status: CANCELLED | OUTPATIENT
Start: 2023-10-13

## 2023-09-15 RX ORDER — HEPARIN 100 UNIT/ML
500 SYRINGE INTRAVENOUS
Status: CANCELLED | OUTPATIENT
Start: 2023-10-13

## 2023-09-15 RX ADMIN — TOCILIZUMAB 800 MG: 20 INJECTION, SOLUTION, CONCENTRATE INTRAVENOUS at 09:09

## 2023-09-15 NOTE — PLAN OF CARE
"Pt states "feeling bad today. Can tell it's time for my medicine". C/O fatigue and generalized pain. IV inserted with no issues. Pt tolerated Actembra well. IV removed with no complications and catheter intact. Site without redness, swelling, or tenderness. Pt with no further concerns, comments, or questions. States she is aware of next appt.    Problem: Adult Inpatient Plan of Care  Goal: Plan of Care Review  Outcome: Ongoing, Progressing  Goal: Patient-Specific Goal (Individualized)  Outcome: Ongoing, Progressing  Goal: Optimal Comfort and Wellbeing  Outcome: Ongoing, Progressing     Problem: Fall Injury Risk  Goal: Absence of Fall and Fall-Related Injury  Outcome: Ongoing, Progressing  Intervention: Identify and Manage Contributors  Flowsheets (Taken 9/15/2023 2991)  Medication Review/Management: medications reviewed     Problem: Infection  Goal: Absence of Infection Signs and Symptoms  Outcome: Ongoing, Progressing     "

## 2023-10-13 ENCOUNTER — INFUSION (OUTPATIENT)
Dept: INFUSION THERAPY | Facility: HOSPITAL | Age: 48
End: 2023-10-13
Attending: INTERNAL MEDICINE
Payer: MEDICARE

## 2023-10-13 VITALS
HEART RATE: 66 BPM | TEMPERATURE: 98 F | HEIGHT: 62 IN | RESPIRATION RATE: 18 BRPM | BODY MASS INDEX: 43.82 KG/M2 | DIASTOLIC BLOOD PRESSURE: 62 MMHG | SYSTOLIC BLOOD PRESSURE: 106 MMHG | OXYGEN SATURATION: 99 % | WEIGHT: 238.13 LBS

## 2023-10-13 DIAGNOSIS — M06.09 RHEUMATOID ARTHRITIS OF MULTIPLE SITES WITH NEGATIVE RHEUMATOID FACTOR: Primary | ICD-10-CM

## 2023-10-13 PROCEDURE — 25000003 PHARM REV CODE 250: Performed by: INTERNAL MEDICINE

## 2023-10-13 PROCEDURE — 96365 THER/PROPH/DIAG IV INF INIT: CPT

## 2023-10-13 PROCEDURE — 63600175 PHARM REV CODE 636 W HCPCS: Mod: JZ,JG | Performed by: INTERNAL MEDICINE

## 2023-10-13 RX ORDER — SODIUM CHLORIDE 0.9 % (FLUSH) 0.9 %
10 SYRINGE (ML) INJECTION
Status: DISCONTINUED | OUTPATIENT
Start: 2023-10-13 | End: 2023-10-13 | Stop reason: HOSPADM

## 2023-10-13 RX ORDER — SODIUM CHLORIDE 0.9 % (FLUSH) 0.9 %
10 SYRINGE (ML) INJECTION
Status: CANCELLED | OUTPATIENT
Start: 2023-11-10

## 2023-10-13 RX ORDER — HEPARIN 100 UNIT/ML
500 SYRINGE INTRAVENOUS
Status: CANCELLED | OUTPATIENT
Start: 2023-11-10

## 2023-10-13 RX ORDER — HEPARIN 100 UNIT/ML
500 SYRINGE INTRAVENOUS
Status: DISCONTINUED | OUTPATIENT
Start: 2023-10-13 | End: 2023-10-13 | Stop reason: HOSPADM

## 2023-10-13 RX ADMIN — TOCILIZUMAB 800 MG: 20 INJECTION, SOLUTION, CONCENTRATE INTRAVENOUS at 09:10

## 2023-10-13 NOTE — NURSING
Infusion# >10    Recent labs? Reviewed    Premeds? None    S/S of current or recent infections in the past 14 days? None/Denies    Recent Surgery/invasive procedures? None/Denies     Any recent vaccines ? None/Denies    Actemra 800 mg administered IV at a 60 minute rate per orders; see MAR and vitals for more  Details.

## 2023-10-18 ENCOUNTER — TELEPHONE (OUTPATIENT)
Dept: RHEUMATOLOGY | Facility: CLINIC | Age: 48
End: 2023-10-18
Payer: MEDICARE

## 2023-10-18 NOTE — TELEPHONE ENCOUNTER
----- Message from Isi Shepherd sent at 10/18/2023 10:43 AM CDT -----  Regarding: pt call back  Name of Who is Calling:Pt         What is the request in detail: Pt needs to reschedule appt to another date. Due to death in family   Please advise           Can the clinic reply by [facility]: yes         What Number to Call Back if not [facility]:Telephone Information:  Mobile          [phone]

## 2023-10-19 ENCOUNTER — OFFICE VISIT (OUTPATIENT)
Dept: RHEUMATOLOGY | Facility: CLINIC | Age: 48
End: 2023-10-19
Payer: MEDICARE

## 2023-10-19 DIAGNOSIS — M31.7 MICROSCOPIC POLYANGIITIS: Primary | ICD-10-CM

## 2023-10-19 DIAGNOSIS — M06.09 RHEUMATOID ARTHRITIS OF MULTIPLE SITES WITH NEGATIVE RHEUMATOID FACTOR: ICD-10-CM

## 2023-10-19 DIAGNOSIS — Z79.899 DRUG-INDUCED IMMUNODEFICIENCY: ICD-10-CM

## 2023-10-19 DIAGNOSIS — Z51.81 ENCOUNTER FOR MEDICATION MONITORING: ICD-10-CM

## 2023-10-19 DIAGNOSIS — F51.01 PRIMARY INSOMNIA: ICD-10-CM

## 2023-10-19 DIAGNOSIS — D84.821 DRUG-INDUCED IMMUNODEFICIENCY: ICD-10-CM

## 2023-10-19 PROCEDURE — 99214 PR OFFICE/OUTPT VISIT, EST, LEVL IV, 30-39 MIN: ICD-10-PCS | Mod: 95,,, | Performed by: INTERNAL MEDICINE

## 2023-10-19 PROCEDURE — 1160F PR REVIEW ALL MEDS BY PRESCRIBER/CLIN PHARMACIST DOCUMENTED: ICD-10-PCS | Mod: CPTII,95,, | Performed by: INTERNAL MEDICINE

## 2023-10-19 PROCEDURE — 4010F ACE/ARB THERAPY RXD/TAKEN: CPT | Mod: CPTII,95,, | Performed by: INTERNAL MEDICINE

## 2023-10-19 PROCEDURE — 4010F PR ACE/ARB THEARPY RXD/TAKEN: ICD-10-PCS | Mod: CPTII,95,, | Performed by: INTERNAL MEDICINE

## 2023-10-19 PROCEDURE — 1160F RVW MEDS BY RX/DR IN RCRD: CPT | Mod: CPTII,95,, | Performed by: INTERNAL MEDICINE

## 2023-10-19 PROCEDURE — 1159F PR MEDICATION LIST DOCUMENTED IN MEDICAL RECORD: ICD-10-PCS | Mod: CPTII,95,, | Performed by: INTERNAL MEDICINE

## 2023-10-19 PROCEDURE — 1159F MED LIST DOCD IN RCRD: CPT | Mod: CPTII,95,, | Performed by: INTERNAL MEDICINE

## 2023-10-19 PROCEDURE — 99214 OFFICE O/P EST MOD 30 MIN: CPT | Mod: 95,,, | Performed by: INTERNAL MEDICINE

## 2023-10-19 RX ORDER — TRAZODONE HYDROCHLORIDE 50 MG/1
50 TABLET ORAL NIGHTLY
Qty: 30 TABLET | Refills: 11 | Status: SHIPPED | OUTPATIENT
Start: 2023-10-19 | End: 2024-10-18

## 2023-10-19 NOTE — PROGRESS NOTES
RHEUMATOLOGY FOLLOW UP - TELE VISIT     The patient location is: [place]  The chief complaint leading to consultation is:  Follow-up visit  Visit type: Virtual visit with synchronous audio and video  Total time spent with patient:  15 minutes  Each patient to whom he or she provides medical services by telemedicine is:  (1) informed of the relationship between the physician and patient and the respective role of any other health care provider with respect to management of the patient; and (2) notified that he or she may decline to receive medical services by telemedicine and may withdraw from such care at any time.    Chief complaints, HPI, ROS, EXAM, Assessment & Plans:-  [place]ise [place] a 48 y.o. pleasant female seen today for follow-up visit.Microscopic polyangiitis on Actemra infusion and CellCept after completing Cytoxan induction therapy.  Reports doing well in terms of arthritis.  Successfully discontinued prednisone couple months ago without any flares.  Taking CellCept 1000 mg twice daily and Actemra every 4 week infusion.  Rheumatological review of system negative otherwise.  100% fist formation bilateral hands without any evidence of synovitis.    1. Microscopic polyangiitis    2. Rheumatoid arthritis of multiple sites with negative rheumatoid factor    3. Drug-induced immunodeficiency    4. Encounter for medication monitoring    5. Primary insomnia      Problem List Items Addressed This Visit       Rheumatoid arthritis of multiple sites with negative rheumatoid factor    Relevant Orders    CBC Auto Differential    Comprehensive Metabolic Panel    C-Reactive Protein    Sedimentation rate    Microscopic polyangiitis - Primary    Relevant Orders    CBC Auto Differential    Comprehensive Metabolic Panel    C-Reactive Protein    Sedimentation rate    Drug-induced immunodeficiency    Relevant Orders    CBC Auto Differential    Comprehensive Metabolic Panel    C-Reactive Protein    Sedimentation rate     Encounter for medication monitoring    Relevant Orders    CBC Auto Differential    Comprehensive Metabolic Panel    C-Reactive Protein    Sedimentation rate     Other Visit Diagnoses       Primary insomnia        Relevant Medications    traZODone (DESYREL) 50 MG tablet           Latest Reference Range & Units Most Recent   ED Screen Negative <1:80  Positive !  10/12/22 13:38   ED Titer 1  1:640  10/12/22 13:38   ED PATTERN 1  Homogeneous  10/12/22 13:38   ds DNA Ab Negative 1:10  Negative 1:10  10/12/22 13:38   Anti-SSA Antibody 0.00 - 0.99 Ratio 0.07  10/12/22 13:38   Anti-SSA Interpretation Negative  Negative  10/12/22 13:38   Anti-SSB Antibody 0.00 - 0.99 Ratio 0.06  10/12/22 13:38   Anti-SSB Interpretation Negative  Negative  10/12/22 13:38   Anti Sm Antibody 0.00 - 0.99 Ratio 0.09  10/12/22 13:38   Anti-Sm Interpretation Negative  Negative  10/12/22 13:38   Anti Sm/RNP Antibody 0.00 - 0.99 Ratio 0.11  10/12/22 13:38   Anti-Sm/RNP Interpretation Negative  Negative  10/12/22 13:38   6- - 450 pmol/8x10(8)RBC 73 (L)  12/15/21 10:29   ANCA Proteinase 3 <0.4 (Negative) U <0.2  10/12/22 13:38   Cytoplasmic Neutrophilic Ab <1:20 Titer See Below !  10/12/22 13:38   MPO <=20 UNITS 22 (H)  10/12/22 13:38   Perinuclear (P-ANCA) <1:20 Titer See Below !  10/12/22 13:38   CCP Antibodies <5.0 U/mL <0.5  10/12/22 13:38   Complement (C-3) 50 - 180 mg/dL 139  5/26/21 17:23   Complement (C-4) 11 - 44 mg/dL 25  5/26/21 17:23   IgG 650 - 1600 mg/dL 1096  12/15/21 10:29   IgM 50 - 300 mg/dL 78  12/15/21 10:29   IgA 40 - 350 mg/dL 387 (H)  12/15/21 10:29   Protein, Serum 6.0 - 8.4 g/dL 7.1  12/15/21 10:29   Albumin grams/dl 3.35 - 5.55 g/dL 3.76  12/15/21 10:29   Alpha-1 grams/dl 0.17 - 0.41 g/dL 0.37  12/15/21 10:29   Alpha-2 0.43 - 0.99 g/dL 0.88  12/15/21 10:29   Beta 0.50 - 1.10 g/dL 0.99  12/15/21 10:29   Gamma 0.67 - 1.58 g/dL 1.10  12/15/21 10:29   Pathologist Interpretation SPE  REVIEWED  12/15/21 10:29   Pathologist  Interpretation SIDDHARTH  REVIEWED  12/15/21 10:29   Immunofix Interp.  SEE COMMENT  12/15/21 10:29   Interleukin 6 <=1.8 pg/mL <1.0  10/12/22 13:38   Rheumatoid Factor 0.0 - 15.0 IU/mL <13.0  10/12/22 13:38   !: Data is abnormal  (L): Data is abnormally low  (H): Data is abnormally high    Significant improvement of microscopic polyangiitis on Actemra and CellCept after completing Cytoxan induction therapy.  Will try lowering dose of CellCept gradually-3 tablets daily for the next month and 2 tablets daily thereafter.  Continue Actemra every 4 weeks.  Drug induced immunodeficiency due to use of immunosuppressive drugs. Monitor carefully for infections. Advised to get immediate medical care if any infection. Also advised strict adherence to age appropriate vaccinations and cancer screenings with PCP.  Hold Actemra and CellCept if any infection.  I have explained all of the above in detail and the patient understands all of the current recommendation(s). I have answered all questions to the best of my ability and to their complete satisfaction.   # Follow up in about 2 months (around 2023).      Past Medical History:   Diagnosis Date    Antiphospholipid antibody with hypercoagulable state 11/10/2022    Hypertension     Microscopic polyangiitis 2020    Stroke            Past Surgical History:   Procedure Laterality Date     SECTION      TUBAL LIGATION          Social History     Tobacco Use    Smoking status: Never    Smokeless tobacco: Never   Substance Use Topics    Alcohol use: Not Currently       Family History   Problem Relation Age of Onset    Hypertension Mother     Cancer Father         esophegal       Review of patient's allergies indicates:   Allergen Reactions    Oranges [orange] Hives       Medication List with Changes/Refills   New Medications    TRAZODONE (DESYREL) 50 MG TABLET    Take 1 tablet (50 mg total) by mouth every evening.   Current Medications    ALBUTEROL (PROVENTIL/VENTOLIN  HFA) 90 MCG/ACTUATION INHALER    2 puffs as needed.    ASPIRIN 325 MG TABLET        ATORVASTATIN (LIPITOR) 10 MG TABLET        BUMETANIDE (BUMEX) 1 MG TABLET    Take 1 mg by mouth 2 (two) times a day.    CHOLECALCIFEROL, VITAMIN D3, 1,250 MCG (50,000 UNIT) CAPSULE    TAKE 1 CAPSULE BY MOUTH ONCE A WEEK FOR VIT. D DEFICIENCY    CLONAZEPAM (KLONOPIN) 1 MG TABLET    TAKE 1 TABLET BY MOUTH ONCE DAILY AT BEDTIME AS NEEDED FOR ANXIETY SLEEP    CLONIDINE (CATAPRES) 0.2 MG TABLET    Take 0.2 mg by mouth 3 (three) times daily.    CYCLOBENZAPRINE (FLEXERIL) 10 MG TABLET    TAKE 1 TABLET THREE TIMES DAILY AS NEEDED FOR MUSCLE SPASM(S)    DULOXETINE (CYMBALTA) 20 MG CAPSULE    Take 20 mg by mouth once daily.    FEROSUL 325 MG (65 MG IRON) TAB TABLET    TAKE 2 TABLETS BY MOUTH DAILY FOR IRON DEFICIENCY    IPRATROPIUM (ATROVENT) 42 MCG (0.06 %) NASAL SPRAY    SPRAY 2 SPRAYS IN EACH NOSTRIL IN THE MORNING, 2 SPRAYS AT NOON, 2 SPRAYS IN THE EVENING, AND 2 SPRAYS BEFORE BEDTIME    MELOXICAM (MOBIC) 7.5 MG TABLET    1 tablet.    MYCOPHENOLATE (CELLCEPT) 500 MG TAB    Take 2 tablets (1,000 mg total) by mouth 2 (two) times daily.    NEBIVOLOL (BYSTOLIC) 20 MG TAB    Take 20 mg by mouth 2 (two) times a day.    ONDANSETRON (ZOFRAN) 4 MG TABLET    Take 1 tablet (4 mg total) by mouth every 8 (eight) hours as needed for Nausea.    POTASSIUM CHLORIDE SA (K-DUR,KLOR-CON) 20 MEQ TABLET    Take 1 tablet (20 mEq total) by mouth 2 (two) times daily.    PROMETHAZINE-DEXTROMETHORPHAN (PROMETHAZINE-DM) 6.25-15 MG/5 ML SYRP    5 ml as needed    SERTRALINE (ZOLOFT) 100 MG TABLET        TEMAZEPAM (RESTORIL) 15 MG CAP    30 mg.    TIZANIDINE (ZANAFLEX) 4 MG TABLET    TAKE 1 TO 2 TABLETS BY MOUTH THREE TIMES DAILY AS NEEDED FOR PAIN    TOBRAMYCIN-DEXAMETHASONE 0.3-0.1% (TOBRADEX) 0.3-0.1 % DRPS    1 drop into affected eye    VALSARTAN (DIOVAN) 160 MG TABLET    Take 1 tablet (160 mg total) by mouth 2 (two) times daily.   Discontinued Medications     METHOTREXATE 2.5 MG TAB    as directed    METHYLPREDNISOLONE (MEDROL DOSEPACK) 4 MG TABLET    use as directed    PREDNISONE (DELTASONE) 5 MG TABLET    TAKE 1 TABLET ONE TIME DAILY       Disclaimer: This note was prepared using voice recognition system and is likely to have sound alike errors and is not proof read.  Please call me with any questions.

## 2023-11-10 ENCOUNTER — INFUSION (OUTPATIENT)
Dept: INFUSION THERAPY | Facility: HOSPITAL | Age: 48
End: 2023-11-10
Attending: INTERNAL MEDICINE
Payer: MEDICARE

## 2023-11-10 VITALS
SYSTOLIC BLOOD PRESSURE: 109 MMHG | DIASTOLIC BLOOD PRESSURE: 72 MMHG | WEIGHT: 239.19 LBS | OXYGEN SATURATION: 99 % | BODY MASS INDEX: 43.75 KG/M2 | HEART RATE: 68 BPM | RESPIRATION RATE: 18 BRPM | TEMPERATURE: 98 F

## 2023-11-10 DIAGNOSIS — M06.09 RHEUMATOID ARTHRITIS OF MULTIPLE SITES WITH NEGATIVE RHEUMATOID FACTOR: Primary | ICD-10-CM

## 2023-11-10 PROCEDURE — 25000003 PHARM REV CODE 250: Performed by: INTERNAL MEDICINE

## 2023-11-10 PROCEDURE — 96365 THER/PROPH/DIAG IV INF INIT: CPT

## 2023-11-10 PROCEDURE — 63600175 PHARM REV CODE 636 W HCPCS: Mod: JZ,JG | Performed by: INTERNAL MEDICINE

## 2023-11-10 RX ORDER — HEPARIN 100 UNIT/ML
500 SYRINGE INTRAVENOUS
Status: DISCONTINUED | OUTPATIENT
Start: 2023-11-10 | End: 2023-11-10 | Stop reason: HOSPADM

## 2023-11-10 RX ORDER — SODIUM CHLORIDE 0.9 % (FLUSH) 0.9 %
10 SYRINGE (ML) INJECTION
Status: CANCELLED | OUTPATIENT
Start: 2023-12-08

## 2023-11-10 RX ORDER — HEPARIN 100 UNIT/ML
500 SYRINGE INTRAVENOUS
Status: CANCELLED | OUTPATIENT
Start: 2023-12-08

## 2023-11-10 RX ADMIN — TOCILIZUMAB 800 MG: 20 INJECTION, SOLUTION, CONCENTRATE INTRAVENOUS at 09:11

## 2023-11-10 NOTE — PLAN OF CARE
Patient tolerated Actemra well today; no adverse reaction noted.  POC reviewed with pt.  No questions or concerns voiced.  NAD noted upon discharge.  No significant new complaints voiced.   Has f/u appt(s) scheduled per MD request.      Problem: Adult Inpatient Plan of Care  Goal: Plan of Care Review  Outcome: Ongoing, Progressing  Flowsheets (Taken 11/10/2023 1459)  Plan of Care Reviewed With: patient  Goal: Patient-Specific Goal (Individualized)  Outcome: Ongoing, Progressing  Flowsheets (Taken 11/10/2023 1459)  Anxieties, Fears or Concerns: pt reports feeling fatigue  Individualized Care Needs: legs elevated, 2 blankets, pillow, snack/drink offered.  Goal: Optimal Comfort and Wellbeing  Outcome: Ongoing, Progressing  Intervention: Provide Person-Centered Care  Flowsheets (Taken 11/10/2023 1459)  Trust Relationship/Rapport:   care explained   choices provided   emotional support provided   empathic listening provided   questions answered   questions encouraged   reassurance provided   thoughts/feelings acknowledged

## 2023-11-17 DIAGNOSIS — M31.7 MICROSCOPIC POLYANGIITIS: ICD-10-CM

## 2023-11-17 DIAGNOSIS — M06.09 RHEUMATOID ARTHRITIS OF MULTIPLE SITES WITH NEGATIVE RHEUMATOID FACTOR: ICD-10-CM

## 2023-11-18 RX ORDER — MYCOPHENOLATE MOFETIL 500 MG/1
1000 TABLET ORAL 2 TIMES DAILY
Qty: 120 TABLET | Refills: 3 | Status: ACTIVE | OUTPATIENT
Start: 2023-11-18 | End: 2024-03-25 | Stop reason: SDUPTHER

## 2023-12-04 ENCOUNTER — TELEPHONE (OUTPATIENT)
Dept: INFUSION THERAPY | Facility: HOSPITAL | Age: 48
End: 2023-12-04
Payer: MEDICARE

## 2023-12-04 NOTE — TELEPHONE ENCOUNTER
----- Message from Angely García sent at 12/4/2023  2:57 PM CST -----  Pt would like to reschedule the appointment on 12/08/2023. Call back number is [phone]. Thx.EL

## 2023-12-15 ENCOUNTER — INFUSION (OUTPATIENT)
Dept: INFUSION THERAPY | Facility: HOSPITAL | Age: 48
End: 2023-12-15
Attending: INTERNAL MEDICINE
Payer: MEDICARE

## 2023-12-15 VITALS
RESPIRATION RATE: 16 BRPM | WEIGHT: 238.13 LBS | HEART RATE: 87 BPM | DIASTOLIC BLOOD PRESSURE: 80 MMHG | TEMPERATURE: 97 F | SYSTOLIC BLOOD PRESSURE: 124 MMHG | OXYGEN SATURATION: 99 % | BODY MASS INDEX: 43.55 KG/M2

## 2023-12-15 DIAGNOSIS — M06.09 RHEUMATOID ARTHRITIS OF MULTIPLE SITES WITH NEGATIVE RHEUMATOID FACTOR: Primary | ICD-10-CM

## 2023-12-15 PROCEDURE — 25000003 PHARM REV CODE 250: Performed by: INTERNAL MEDICINE

## 2023-12-15 PROCEDURE — 63600175 PHARM REV CODE 636 W HCPCS: Mod: JZ,JG | Performed by: INTERNAL MEDICINE

## 2023-12-15 PROCEDURE — 96365 THER/PROPH/DIAG IV INF INIT: CPT

## 2023-12-15 PROCEDURE — A4216 STERILE WATER/SALINE, 10 ML: HCPCS | Performed by: INTERNAL MEDICINE

## 2023-12-15 RX ORDER — ALPRAZOLAM 0.25 MG/1
0.25 TABLET ORAL DAILY
COMMUNITY
Start: 2023-10-24

## 2023-12-15 RX ORDER — SODIUM CHLORIDE 0.9 % (FLUSH) 0.9 %
10 SYRINGE (ML) INJECTION
Status: DISCONTINUED | OUTPATIENT
Start: 2023-12-15 | End: 2023-12-15 | Stop reason: HOSPADM

## 2023-12-15 RX ORDER — HYDRALAZINE HYDROCHLORIDE 50 MG/1
50 TABLET, FILM COATED ORAL EVERY 12 HOURS
COMMUNITY
Start: 2023-07-20

## 2023-12-15 RX ORDER — SPIRONOLACTONE 100 MG/1
1 TABLET, FILM COATED ORAL EVERY MORNING
COMMUNITY
Start: 2023-08-24

## 2023-12-15 RX ORDER — HEPARIN 100 UNIT/ML
500 SYRINGE INTRAVENOUS
Status: CANCELLED | OUTPATIENT
Start: 2024-01-05

## 2023-12-15 RX ORDER — FLUCONAZOLE 150 MG/1
150 TABLET ORAL DAILY
COMMUNITY
Start: 2023-10-20

## 2023-12-15 RX ORDER — SODIUM CHLORIDE 0.9 % (FLUSH) 0.9 %
10 SYRINGE (ML) INJECTION
Status: CANCELLED | OUTPATIENT
Start: 2024-01-05

## 2023-12-15 RX ORDER — NIFEDIPINE 60 MG/1
60 TABLET, EXTENDED RELEASE ORAL DAILY
COMMUNITY
Start: 2023-09-08

## 2023-12-15 RX ADMIN — Medication 10 ML: at 09:12

## 2023-12-15 RX ADMIN — TOCILIZUMAB 800 MG: 20 INJECTION, SOLUTION, CONCENTRATE INTRAVENOUS at 09:12

## 2023-12-15 NOTE — PLAN OF CARE
POC reviewed with pt. All needs and concerns addressed.   Problem: Adult Inpatient Plan of Care  Goal: Plan of Care Review  Outcome: Ongoing, Progressing  Flowsheets (Taken 12/15/2023 0910)  Plan of Care Reviewed With: patient  Goal: Patient-Specific Goal (Individualized)  Outcome: Ongoing, Progressing  Flowsheets (Taken 12/15/2023 0910)  Anxieties, Fears or Concerns: Pt states her brother passed away in October so she's been struggling with that dionicio with the holiday coming up  Individualized Care Needs: In recliner with feet elevated, warm blanket  Goal: Optimal Comfort and Wellbeing  Outcome: Ongoing, Progressing  Intervention: Provide Person-Centered Care  Flowsheets (Taken 12/15/2023 0910)  Trust Relationship/Rapport:   care explained   questions encouraged   choices provided   reassurance provided   emotional support provided   thoughts/feelings acknowledged   empathic listening provided   questions answered     Problem: Infection  Goal: Absence of Infection Signs and Symptoms  Outcome: Ongoing, Progressing  Intervention: Prevent or Manage Infection  Flowsheets (Taken 12/15/2023 0910)  Infection Management: aseptic technique maintained

## 2024-01-16 ENCOUNTER — TELEPHONE (OUTPATIENT)
Dept: RHEUMATOLOGY | Facility: CLINIC | Age: 49
End: 2024-01-16
Payer: MEDICARE

## 2024-01-16 NOTE — TELEPHONE ENCOUNTER
----- Message from Abi Rodriguez sent at 1/16/2024 12:20 PM CST -----  Regarding: pt adivce  Contact: [phone]  Pt calling in regards to appt 1/17/24, due to bridge being closed, pls call

## 2024-01-16 NOTE — TELEPHONE ENCOUNTER
Changed rika with Dr LÓPEZ a virtual visit.  Can you contact patient to RS infusion a lab due to weather 1/17/2024

## 2024-01-17 ENCOUNTER — PATIENT MESSAGE (OUTPATIENT)
Dept: RHEUMATOLOGY | Facility: CLINIC | Age: 49
End: 2024-01-17

## 2024-01-17 ENCOUNTER — OFFICE VISIT (OUTPATIENT)
Dept: RHEUMATOLOGY | Facility: CLINIC | Age: 49
End: 2024-01-17
Payer: MEDICARE

## 2024-01-17 DIAGNOSIS — D68.61 ANTIPHOSPHOLIPID ANTIBODY WITH HYPERCOAGULABLE STATE: ICD-10-CM

## 2024-01-17 DIAGNOSIS — D84.821 DRUG-INDUCED IMMUNODEFICIENCY: ICD-10-CM

## 2024-01-17 DIAGNOSIS — Z51.81 ENCOUNTER FOR MEDICATION MONITORING: ICD-10-CM

## 2024-01-17 DIAGNOSIS — M06.09 RHEUMATOID ARTHRITIS OF MULTIPLE SITES WITH NEGATIVE RHEUMATOID FACTOR: ICD-10-CM

## 2024-01-17 DIAGNOSIS — Z79.899 DRUG-INDUCED IMMUNODEFICIENCY: ICD-10-CM

## 2024-01-17 DIAGNOSIS — M31.7 MICROSCOPIC POLYANGIITIS: Primary | ICD-10-CM

## 2024-01-17 PROCEDURE — 99214 OFFICE O/P EST MOD 30 MIN: CPT | Mod: 95,,, | Performed by: INTERNAL MEDICINE

## 2024-01-17 PROCEDURE — 4010F ACE/ARB THERAPY RXD/TAKEN: CPT | Mod: CPTII,95,, | Performed by: INTERNAL MEDICINE

## 2024-01-17 PROCEDURE — 1160F RVW MEDS BY RX/DR IN RCRD: CPT | Mod: CPTII,95,, | Performed by: INTERNAL MEDICINE

## 2024-01-17 PROCEDURE — 1159F MED LIST DOCD IN RCRD: CPT | Mod: CPTII,95,, | Performed by: INTERNAL MEDICINE

## 2024-01-17 RX ORDER — METHYLPREDNISOLONE 4 MG/1
TABLET ORAL
Qty: 21 TABLET | Refills: 0 | Status: SHIPPED | OUTPATIENT
Start: 2024-01-17

## 2024-01-17 NOTE — PROGRESS NOTES
RHEUMATOLOGY FOLLOW UP - TELE VISIT     The patient location is: [place]  The chief complaint leading to consultation is:  Follow-up visit  Visit type: Virtual visit with synchronous audio and video  Total time spent with patient:  15 minutes  Each patient to whom he or she provides medical services by telemedicine is:  (1) informed of the relationship between the physician and patient and the respective role of any other health care provider with respect to management of the patient; and (2) notified that he or she may decline to receive medical services by telemedicine and may withdraw from such care at any time.    Chief complaints, HPI, ROS, EXAM, Assessment & Plans:-  Denise [place] a 48 y.o. pleasant female seen today for follow-up visit.Microscopic polyangiitis on Actemra infusion and CellCept after completing Cytoxan induction therapy.  Complains of worsening diffuse pain and joint pain for the past month.  Lost multiple family members in the past couple of months..  Taking prednisone 10 mg a day for flare.  Taking CellCept 1000 mg twice daily and Actemra every 4 week infusion.  Rheumatological review of system negative otherwise.  100% fist formation bilateral hands without any evidence of synovitis.    1. Microscopic polyangiitis    2. Rheumatoid arthritis of multiple sites with negative rheumatoid factor    3. Drug-induced immunodeficiency    4. Encounter for medication monitoring    5. Antiphospholipid antibody with hypercoagulable state      Problem List Items Addressed This Visit       Rheumatoid arthritis of multiple sites with negative rheumatoid factor    Relevant Medications    methylPREDNISolone (MEDROL DOSEPACK) 4 mg tablet    Microscopic polyangiitis - Primary    Relevant Medications    methylPREDNISolone (MEDROL DOSEPACK) 4 mg tablet    Antiphospholipid antibody with hypercoagulable state    Drug-induced immunodeficiency    Encounter for medication monitoring      Latest Reference Range &  Units Most Recent   ED Screen Negative <1:80  Positive !  10/12/22 13:38   ED Titer 1  1:640  10/12/22 13:38   ED PATTERN 1  Homogeneous  10/12/22 13:38   ds DNA Ab Negative 1:10  Negative 1:10  10/12/22 13:38   Anti-SSA Antibody 0.00 - 0.99 Ratio 0.07  10/12/22 13:38   Anti-SSA Interpretation Negative  Negative  10/12/22 13:38   Anti-SSB Antibody 0.00 - 0.99 Ratio 0.06  10/12/22 13:38   Anti-SSB Interpretation Negative  Negative  10/12/22 13:38   Anti Sm Antibody 0.00 - 0.99 Ratio 0.09  10/12/22 13:38   Anti-Sm Interpretation Negative  Negative  10/12/22 13:38   Anti Sm/RNP Antibody 0.00 - 0.99 Ratio 0.11  10/12/22 13:38   Anti-Sm/RNP Interpretation Negative  Negative  10/12/22 13:38   6- - 450 pmol/8x10(8)RBC 73 (L)  12/15/21 10:29   ANCA Proteinase 3 <0.4 (Negative) U <0.2  10/12/22 13:38   Cytoplasmic Neutrophilic Ab <1:20 Titer See Below !  10/12/22 13:38   MPO <=20 UNITS 22 (H)  10/12/22 13:38   Perinuclear (P-ANCA) <1:20 Titer See Below !  10/12/22 13:38   CCP Antibodies <5.0 U/mL <0.5  10/12/22 13:38   Complement (C-3) 50 - 180 mg/dL 139  5/26/21 17:23   Complement (C-4) 11 - 44 mg/dL 25  5/26/21 17:23   IgG 650 - 1600 mg/dL 1096  12/15/21 10:29   IgM 50 - 300 mg/dL 78  12/15/21 10:29   IgA 40 - 350 mg/dL 387 (H)  12/15/21 10:29   Protein, Serum 6.0 - 8.4 g/dL 7.1  12/15/21 10:29   Albumin grams/dl 3.35 - 5.55 g/dL 3.76  12/15/21 10:29   Alpha-1 grams/dl 0.17 - 0.41 g/dL 0.37  12/15/21 10:29   Alpha-2 0.43 - 0.99 g/dL 0.88  12/15/21 10:29   Beta 0.50 - 1.10 g/dL 0.99  12/15/21 10:29   Gamma 0.67 - 1.58 g/dL 1.10  12/15/21 10:29   Pathologist Interpretation SPE  REVIEWED  12/15/21 10:29   Pathologist Interpretation SIDDHARTH  REVIEWED  12/15/21 10:29   Immunofix Interp.  SEE COMMENT  12/15/21 10:29   Interleukin 6 <=1.8 pg/mL <1.0  10/12/22 13:38   Rheumatoid Factor 0.0 - 15.0 IU/mL <13.0  10/12/22 13:38   !: Data is abnormal  (L): Data is abnormally low  (H): Data is abnormally high    Mild flare likely  secondary to stress.  Try Medrol Dosepak.  Continue Actemra and CellCept .  Will try lowering dose of CellCept gradually-3 tablets daily for the next month and 2 tablets daily thereafter.  Continue Actemra every 4 weeks.  Drug induced immunodeficiency due to use of immunosuppressive drugs. Monitor carefully for infections. Advised to get immediate medical care if any infection. Also advised strict adherence to age appropriate vaccinations and cancer screenings with PCP.  Hold Actemra and CellCept if any infection.  I have explained all of the above in detail and the patient understands all of the current recommendation(s). I have answered all questions to the best of my ability and to their complete satisfaction.   # Follow up in about 3 months (around 2024).      Past Medical History:   Diagnosis Date    Antiphospholipid antibody with hypercoagulable state 11/10/2022    Hypertension     Microscopic polyangiitis 2020    Stroke            Past Surgical History:   Procedure Laterality Date     SECTION      TUBAL LIGATION          Social History     Tobacco Use    Smoking status: Never    Smokeless tobacco: Never   Substance Use Topics    Alcohol use: Not Currently       Family History   Problem Relation Age of Onset    Hypertension Mother     Cancer Father         esophegal       Review of patient's allergies indicates:   Allergen Reactions    Oranges [orange] Hives       Medication List with Changes/Refills   New Medications    METHYLPREDNISOLONE (MEDROL DOSEPACK) 4 MG TABLET    use as directed   Current Medications    ALBUTEROL (PROVENTIL/VENTOLIN HFA) 90 MCG/ACTUATION INHALER    2 puffs as needed.    ALPRAZOLAM (XANAX) 0.25 MG TABLET    Take 0.25 mg by mouth Daily.    ASPIRIN 325 MG TABLET        ATORVASTATIN (LIPITOR) 10 MG TABLET        BUMETANIDE (BUMEX) 1 MG TABLET    Take 1 mg by mouth 2 (two) times a day.    CHOLECALCIFEROL, VITAMIN D3, 1,250 MCG (50,000 UNIT) CAPSULE    TAKE 1 CAPSULE  BY MOUTH ONCE A WEEK FOR VIT. D DEFICIENCY    CLONAZEPAM (KLONOPIN) 1 MG TABLET    TAKE 1 TABLET BY MOUTH ONCE DAILY AT BEDTIME AS NEEDED FOR ANXIETY SLEEP    CLONIDINE (CATAPRES) 0.2 MG TABLET    Take 0.2 mg by mouth 3 (three) times daily.    CYCLOBENZAPRINE (FLEXERIL) 10 MG TABLET    TAKE 1 TABLET THREE TIMES DAILY AS NEEDED FOR MUSCLE SPASM(S)    DULOXETINE (CYMBALTA) 20 MG CAPSULE    Take 20 mg by mouth once daily.    FEROSUL 325 MG (65 MG IRON) TAB TABLET    TAKE 2 TABLETS BY MOUTH DAILY FOR IRON DEFICIENCY    FLUCONAZOLE (DIFLUCAN) 150 MG TAB    Take 150 mg by mouth once daily.    HYDRALAZINE (APRESOLINE) 50 MG TABLET    Take 50 mg by mouth every 12 (twelve) hours.    IPRATROPIUM (ATROVENT) 42 MCG (0.06 %) NASAL SPRAY    SPRAY 2 SPRAYS IN EACH NOSTRIL IN THE MORNING, 2 SPRAYS AT NOON, 2 SPRAYS IN THE EVENING, AND 2 SPRAYS BEFORE BEDTIME    MELOXICAM (MOBIC) 7.5 MG TABLET    1 tablet.    MYCOPHENOLATE (CELLCEPT) 500 MG TAB    Take 2 tablets (1,000 mg total) by mouth 2 (two) times daily.    NEBIVOLOL (BYSTOLIC) 20 MG TAB    Take 20 mg by mouth 2 (two) times a day.    NIFEDIPINE (PROCARDIA-XL) 60 MG (OSM) 24 HR TABLET    Take 60 mg by mouth once daily.    ONDANSETRON (ZOFRAN) 4 MG TABLET    Take 1 tablet (4 mg total) by mouth every 8 (eight) hours as needed for Nausea.    POTASSIUM CHLORIDE SA (K-DUR,KLOR-CON) 20 MEQ TABLET    Take 1 tablet (20 mEq total) by mouth 2 (two) times daily.    PROMETHAZINE-DEXTROMETHORPHAN (PROMETHAZINE-DM) 6.25-15 MG/5 ML SYRP    5 ml as needed    SERTRALINE (ZOLOFT) 100 MG TABLET        SPIRONOLACTONE (ALDACTONE) 100 MG TABLET    Take 1 tablet by mouth every morning.    TEMAZEPAM (RESTORIL) 15 MG CAP    30 mg.    TIZANIDINE (ZANAFLEX) 4 MG TABLET    TAKE 1 TO 2 TABLETS BY MOUTH THREE TIMES DAILY AS NEEDED FOR PAIN    TOBRAMYCIN-DEXAMETHASONE 0.3-0.1% (TOBRADEX) 0.3-0.1 % DRPS    1 drop into affected eye    TRAZODONE (DESYREL) 50 MG TABLET    Take 1 tablet (50 mg total) by mouth every  evening.    VALSARTAN (DIOVAN) 160 MG TABLET    Take 1 tablet (160 mg total) by mouth 2 (two) times daily.       Disclaimer: This note was prepared using voice recognition system and is likely to have sound alike errors and is not proof read.  Please call me with any questions.

## 2024-01-24 ENCOUNTER — INFUSION (OUTPATIENT)
Dept: INFUSION THERAPY | Facility: HOSPITAL | Age: 49
End: 2024-01-24
Attending: INTERNAL MEDICINE
Payer: MEDICARE

## 2024-01-24 VITALS
HEART RATE: 85 BPM | HEIGHT: 62 IN | DIASTOLIC BLOOD PRESSURE: 71 MMHG | SYSTOLIC BLOOD PRESSURE: 100 MMHG | TEMPERATURE: 98 F | RESPIRATION RATE: 18 BRPM | BODY MASS INDEX: 43.9 KG/M2 | WEIGHT: 238.56 LBS | OXYGEN SATURATION: 99 %

## 2024-01-24 DIAGNOSIS — M06.09 RHEUMATOID ARTHRITIS OF MULTIPLE SITES WITH NEGATIVE RHEUMATOID FACTOR: Primary | ICD-10-CM

## 2024-01-24 PROCEDURE — 96365 THER/PROPH/DIAG IV INF INIT: CPT

## 2024-01-24 PROCEDURE — 25000003 PHARM REV CODE 250: Performed by: INTERNAL MEDICINE

## 2024-01-24 PROCEDURE — 63600175 PHARM REV CODE 636 W HCPCS: Mod: JZ,JG | Performed by: INTERNAL MEDICINE

## 2024-01-24 RX ORDER — SODIUM CHLORIDE 0.9 % (FLUSH) 0.9 %
10 SYRINGE (ML) INJECTION
Status: CANCELLED | OUTPATIENT
Start: 2024-02-07

## 2024-01-24 RX ORDER — HEPARIN 100 UNIT/ML
500 SYRINGE INTRAVENOUS
Status: CANCELLED | OUTPATIENT
Start: 2024-02-07

## 2024-01-24 RX ADMIN — TOCILIZUMAB 800 MG: 20 INJECTION, SOLUTION, CONCENTRATE INTRAVENOUS at 08:01

## 2024-01-24 NOTE — PLAN OF CARE
"Pt is stable. Pt administered Actemra today. Pt voiced she was doing "okay," today. POC discussed and no concerns noted. Pt will follow up in four weeks.      Problem: Adult Inpatient Plan of Care  Goal: Plan of Care Review  Outcome: Ongoing, Progressing  Goal: Patient-Specific Goal (Individualized)  Outcome: Ongoing, Progressing  Flowsheets (Taken 1/24/2024 0841)  Anxieties, Fears or Concerns: Pt denies.  Individualized Care Needs:   Pt provided pillow   warm blanket   legs elevated in reclined position. Refreshments provided.  Goal: Optimal Comfort and Wellbeing  Outcome: Ongoing, Progressing     Problem: Infection  Goal: Absence of Infection Signs and Symptoms  Outcome: Ongoing, Progressing     Problem: Fall Injury Risk  Goal: Absence of Fall and Fall-Related Injury  Outcome: Ongoing, Progressing     "

## 2024-01-24 NOTE — DISCHARGE INSTRUCTIONS
Thank you for allowing me to care for you today,  KELLIE BoudreauxN, RN    [facility]  [address] [address]  [phone] phone     [phone] fax  Hours of Operation: Monday- Friday 7:00am- 5:30pm  After hours phone  [phone]  Hematology / Oncology Physicians on call      YESSICA Haque Dr., Dr., Dr., NICOLE Sales, NICOLE Alvarez, NICOLE    Please call with any concerns regarding your appointment today.   FALL PREVENTION   Falls often occur due to slipping, tripping or losing your balance. Here are ways to reduce your risk of falling again.   Was there anything that caused your fall that can be fixed, removed or replaced?   Make your home safe by keeping walkways clear of objects you may trip over.   Use non-slip pads under rugs.   Do not walk in poorly lit areas.   Do not stand on chairs or wobbly ladders.   Use caution when reaching overhead or looking upward. This position can cause a loss of balance.   Be sure your shoes fit properly, have non-slip bottoms and are in good condition.   Be cautious when going up and down stairs, curbs, and when walking on uneven sidewalks.   If your balance is poor, consider using a cane or walker.   If your fall was related to alcohol use, stop or limit alcohol intake.   If your fall was related to use of sleeping medicines, talk to your doctor about this. You may need to reduce your dosage at bedtime if you awaken during the night to go to the bathroom.   To reduce the need for nighttime bathroom trips:   Avoid drinking fluids for several hours before going to bed   Empty your bladder before going to bed   Men can keep a urinal at the bedside   © 2377-8685 [employer], [address] [place] [address], [place] [address]. All rights reserved. This information is not intended as a substitute for professional medical care. Always follow your  healthcare professional's instructions.

## 2024-01-25 ENCOUNTER — TELEPHONE (OUTPATIENT)
Dept: RHEUMATOLOGY | Facility: CLINIC | Age: 49
End: 2024-01-25
Payer: MEDICARE

## 2024-01-25 NOTE — TELEPHONE ENCOUNTER
----- Message from Ruperto Noguera MD sent at 1/24/2024  6:07 PM CST -----  Stable values except elevated BUN and creatinine.  Could be due to dehydration.  Advise to improve hydration and repeat CMP next week.

## 2024-01-30 ENCOUNTER — TELEPHONE (OUTPATIENT)
Dept: RHEUMATOLOGY | Facility: CLINIC | Age: 49
End: 2024-01-30
Payer: MEDICARE

## 2024-02-01 ENCOUNTER — LAB VISIT (OUTPATIENT)
Dept: LAB | Facility: HOSPITAL | Age: 49
End: 2024-02-01
Attending: INTERNAL MEDICINE
Payer: MEDICARE

## 2024-02-01 DIAGNOSIS — M31.7 MICROSCOPIC POLYANGIITIS: ICD-10-CM

## 2024-02-01 DIAGNOSIS — G89.4 CHRONIC PAIN SYNDROME: ICD-10-CM

## 2024-02-01 DIAGNOSIS — Z79.899 LONG TERM CURRENT USE OF IMMUNOSUPPRESSIVE DRUG: ICD-10-CM

## 2024-02-01 DIAGNOSIS — D84.9 IMMUNOCOMPROMISED: ICD-10-CM

## 2024-02-01 DIAGNOSIS — M06.09 RHEUMATOID ARTHRITIS OF MULTIPLE SITES WITH NEGATIVE RHEUMATOID FACTOR: ICD-10-CM

## 2024-02-01 LAB
ALBUMIN SERPL BCP-MCNC: 4.3 G/DL (ref 3.5–5.2)
ALP SERPL-CCNC: 64 U/L (ref 55–135)
ALT SERPL W/O P-5'-P-CCNC: 27 U/L (ref 10–44)
ANION GAP SERPL CALC-SCNC: 8 MMOL/L (ref 8–16)
AST SERPL-CCNC: 17 U/L (ref 10–40)
BILIRUB SERPL-MCNC: 0.5 MG/DL (ref 0.1–1)
BUN SERPL-MCNC: 32 MG/DL (ref 6–20)
CALCIUM SERPL-MCNC: 9.4 MG/DL (ref 8.7–10.5)
CHLORIDE SERPL-SCNC: 109 MMOL/L (ref 95–110)
CO2 SERPL-SCNC: 22 MMOL/L (ref 23–29)
CREAT SERPL-MCNC: 1.6 MG/DL (ref 0.5–1.4)
EST. GFR  (NO RACE VARIABLE): 39 ML/MIN/1.73 M^2
GLUCOSE SERPL-MCNC: 104 MG/DL (ref 70–110)
POTASSIUM SERPL-SCNC: 4.7 MMOL/L (ref 3.5–5.1)
PROT SERPL-MCNC: 7.5 G/DL (ref 6–8.4)
SODIUM SERPL-SCNC: 139 MMOL/L (ref 136–145)

## 2024-02-01 PROCEDURE — 36415 COLL VENOUS BLD VENIPUNCTURE: CPT | Performed by: INTERNAL MEDICINE

## 2024-02-01 PROCEDURE — 80053 COMPREHEN METABOLIC PANEL: CPT | Performed by: INTERNAL MEDICINE

## 2024-02-21 ENCOUNTER — INFUSION (OUTPATIENT)
Dept: INFUSION THERAPY | Facility: HOSPITAL | Age: 49
End: 2024-02-21
Attending: INTERNAL MEDICINE
Payer: MEDICARE

## 2024-02-21 VITALS
HEART RATE: 76 BPM | DIASTOLIC BLOOD PRESSURE: 67 MMHG | SYSTOLIC BLOOD PRESSURE: 98 MMHG | BODY MASS INDEX: 44.75 KG/M2 | HEIGHT: 62 IN | WEIGHT: 243.19 LBS | TEMPERATURE: 98 F | OXYGEN SATURATION: 98 % | RESPIRATION RATE: 16 BRPM

## 2024-02-21 DIAGNOSIS — M06.09 RHEUMATOID ARTHRITIS OF MULTIPLE SITES WITH NEGATIVE RHEUMATOID FACTOR: Primary | ICD-10-CM

## 2024-02-21 PROCEDURE — 25000003 PHARM REV CODE 250: Performed by: INTERNAL MEDICINE

## 2024-02-21 PROCEDURE — 96365 THER/PROPH/DIAG IV INF INIT: CPT

## 2024-02-21 PROCEDURE — 63600175 PHARM REV CODE 636 W HCPCS: Mod: JZ,JG | Performed by: INTERNAL MEDICINE

## 2024-02-21 RX ORDER — HEPARIN 100 UNIT/ML
500 SYRINGE INTRAVENOUS
Status: CANCELLED | OUTPATIENT
Start: 2024-03-06

## 2024-02-21 RX ORDER — SODIUM CHLORIDE 0.9 % (FLUSH) 0.9 %
10 SYRINGE (ML) INJECTION
Status: DISCONTINUED | OUTPATIENT
Start: 2024-02-21 | End: 2024-02-21 | Stop reason: HOSPADM

## 2024-02-21 RX ORDER — SODIUM CHLORIDE 0.9 % (FLUSH) 0.9 %
10 SYRINGE (ML) INJECTION
Status: CANCELLED | OUTPATIENT
Start: 2024-03-06

## 2024-02-21 RX ADMIN — TOCILIZUMAB 800 MG: 20 INJECTION, SOLUTION, CONCENTRATE INTRAVENOUS at 09:02

## 2024-02-21 NOTE — PLAN OF CARE
Problem: Adult Inpatient Plan of Care  Goal: Plan of Care Review  Outcome: Ongoing, Progressing  Flowsheets (Taken 2/21/2024 0923)  Plan of Care Reviewed With: patient  Goal: Patient-Specific Goal (Individualized)  Outcome: Ongoing, Progressing  Flowsheets (Taken 2/21/2024 0923)  Anxieties, Fears or Concerns: denies  Individualized Care Needs: feet elevated, pillow and warm blanket provided  Goal: Optimal Comfort and Wellbeing  Outcome: Ongoing, Progressing  Intervention: Monitor Pain and Promote Comfort  Flowsheets (Taken 2/21/2024 0927)  Pain Management Interventions:   pillow support provided   warm blanket provided   quiet environment facilitated   relaxation techniques promoted  Intervention: Provide Person-Centered Care  Flowsheets (Taken 2/21/2024 0927)  Trust Relationship/Rapport:   care explained   choices provided   emotional support provided   empathic listening provided   questions answered   questions encouraged   reassurance provided   thoughts/feelings acknowledged     Problem: Infection  Goal: Absence of Infection Signs and Symptoms  Outcome: Ongoing, Progressing  Intervention: Prevent or Manage Infection  Flowsheets (Taken 2/21/2024 0927)  Infection Management: aseptic technique maintained     Problem: Fall Injury Risk  Goal: Absence of Fall and Fall-Related Injury  Outcome: Ongoing, Progressing  Intervention: Identify and Manage Contributors  Flowsheets (Taken 2/21/2024 0923)  Self-Care Promotion:   independence encouraged   BADL personal objects within reach  Medication Review/Management: medications reviewed  Intervention: Promote Injury-Free Environment  Flowsheets (Taken 2/21/2024 0923)  Safety Promotion/Fall Prevention:   assistive device/personal item within reach   Fall Risk reviewed with patient/family   medications reviewed   in recliner, wheels locked   instructed to call staff for mobility

## 2024-03-21 ENCOUNTER — INFUSION (OUTPATIENT)
Dept: INFUSION THERAPY | Facility: HOSPITAL | Age: 49
End: 2024-03-21
Attending: INTERNAL MEDICINE
Payer: MEDICARE

## 2024-03-21 VITALS
BODY MASS INDEX: 43.55 KG/M2 | WEIGHT: 238.13 LBS | SYSTOLIC BLOOD PRESSURE: 111 MMHG | OXYGEN SATURATION: 98 % | HEART RATE: 75 BPM | RESPIRATION RATE: 18 BRPM | TEMPERATURE: 97 F | DIASTOLIC BLOOD PRESSURE: 79 MMHG

## 2024-03-21 DIAGNOSIS — M06.09 RHEUMATOID ARTHRITIS OF MULTIPLE SITES WITH NEGATIVE RHEUMATOID FACTOR: Primary | ICD-10-CM

## 2024-03-21 PROCEDURE — 25000003 PHARM REV CODE 250: Performed by: INTERNAL MEDICINE

## 2024-03-21 PROCEDURE — 63600175 PHARM REV CODE 636 W HCPCS: Mod: JZ,JG | Performed by: INTERNAL MEDICINE

## 2024-03-21 PROCEDURE — 96365 THER/PROPH/DIAG IV INF INIT: CPT

## 2024-03-21 RX ORDER — HEPARIN 100 UNIT/ML
500 SYRINGE INTRAVENOUS
Status: CANCELLED | OUTPATIENT
Start: 2024-04-18

## 2024-03-21 RX ORDER — SODIUM CHLORIDE 0.9 % (FLUSH) 0.9 %
10 SYRINGE (ML) INJECTION
Status: CANCELLED | OUTPATIENT
Start: 2024-04-18

## 2024-03-21 RX ADMIN — TOCILIZUMAB 800 MG: 20 INJECTION, SOLUTION, CONCENTRATE INTRAVENOUS at 08:03

## 2024-03-21 NOTE — PLAN OF CARE
Discussed plan of care with pt. Addressed any and ongoing concerns. Pt denies    Problem: Adult Inpatient Plan of Care  Goal: Plan of Care Review  Outcome: Ongoing, Progressing  Flowsheets (Taken 3/21/2024 0827)  Plan of Care Reviewed With: patient  Goal: Absence of Hospital-Acquired Illness or Injury  Outcome: Ongoing, Progressing  Intervention: Identify and Manage Fall Risk  Flowsheets (Taken 3/21/2024 0827)  Safety Promotion/Fall Prevention:   in recliner, wheels locked   nonskid shoes/socks when out of bed   room near unit station  Intervention: Prevent Infection  Flowsheets (Taken 3/21/2024 0827)  Infection Prevention:   equipment surfaces disinfected   hand hygiene promoted  Goal: Optimal Comfort and Wellbeing  Outcome: Ongoing, Progressing  Intervention: Monitor Pain and Promote Comfort  Flowsheets (Taken 3/21/2024 0827)  Pain Management Interventions:   warm blanket provided   quiet environment facilitated   relaxation techniques promoted   pillow support provided  Intervention: Provide Person-Centered Care  Flowsheets (Taken 3/21/2024 0827)  Trust Relationship/Rapport:   care explained   questions encouraged   reassurance provided   choices provided   emotional support provided   thoughts/feelings acknowledged   questions answered   empathic listening provided

## 2024-03-25 DIAGNOSIS — M06.09 RHEUMATOID ARTHRITIS OF MULTIPLE SITES WITH NEGATIVE RHEUMATOID FACTOR: ICD-10-CM

## 2024-03-25 DIAGNOSIS — M31.7 MICROSCOPIC POLYANGIITIS: ICD-10-CM

## 2024-03-25 RX ORDER — MYCOPHENOLATE MOFETIL 500 MG/1
1000 TABLET ORAL 2 TIMES DAILY
Qty: 120 TABLET | Refills: 3 | Status: ACTIVE | OUTPATIENT
Start: 2024-03-25 | End: 2024-05-07

## 2024-04-18 ENCOUNTER — INFUSION (OUTPATIENT)
Dept: INFUSION THERAPY | Facility: HOSPITAL | Age: 49
End: 2024-04-18
Attending: INTERNAL MEDICINE
Payer: MEDICARE

## 2024-04-18 VITALS
SYSTOLIC BLOOD PRESSURE: 105 MMHG | WEIGHT: 238.31 LBS | HEART RATE: 82 BPM | OXYGEN SATURATION: 99 % | RESPIRATION RATE: 16 BRPM | TEMPERATURE: 97 F | BODY MASS INDEX: 43.59 KG/M2 | DIASTOLIC BLOOD PRESSURE: 67 MMHG

## 2024-04-18 DIAGNOSIS — M06.09 RHEUMATOID ARTHRITIS OF MULTIPLE SITES WITH NEGATIVE RHEUMATOID FACTOR: Primary | ICD-10-CM

## 2024-04-18 PROCEDURE — 96365 THER/PROPH/DIAG IV INF INIT: CPT

## 2024-04-18 PROCEDURE — A4216 STERILE WATER/SALINE, 10 ML: HCPCS | Performed by: INTERNAL MEDICINE

## 2024-04-18 PROCEDURE — 63600175 PHARM REV CODE 636 W HCPCS: Mod: JZ,JG | Performed by: INTERNAL MEDICINE

## 2024-04-18 PROCEDURE — 25000003 PHARM REV CODE 250: Performed by: INTERNAL MEDICINE

## 2024-04-18 RX ORDER — HEPARIN 100 UNIT/ML
500 SYRINGE INTRAVENOUS
Status: CANCELLED | OUTPATIENT
Start: 2024-05-16

## 2024-04-18 RX ORDER — SODIUM CHLORIDE 0.9 % (FLUSH) 0.9 %
10 SYRINGE (ML) INJECTION
Status: DISCONTINUED | OUTPATIENT
Start: 2024-04-18 | End: 2024-04-18 | Stop reason: HOSPADM

## 2024-04-18 RX ORDER — SODIUM CHLORIDE 0.9 % (FLUSH) 0.9 %
10 SYRINGE (ML) INJECTION
Status: CANCELLED | OUTPATIENT
Start: 2024-05-16

## 2024-04-18 RX ADMIN — Medication 10 ML: at 08:04

## 2024-04-18 RX ADMIN — TOCILIZUMAB 800 MG: 20 INJECTION, SOLUTION, CONCENTRATE INTRAVENOUS at 09:04

## 2024-04-18 NOTE — PLAN OF CARE
Plan of care reviewed with patient. Discussed if there are any new or ongoing concerns. Denies.   Problem: Adult Inpatient Plan of Care  Goal: Plan of Care Review  Outcome: Ongoing, Progressing  Flowsheets (Taken 4/18/2024 0812)  Plan of Care Reviewed With: patient  Goal: Absence of Hospital-Acquired Illness or Injury  Outcome: Ongoing, Progressing  Intervention: Identify and Manage Fall Risk  Flowsheets (Taken 4/18/2024 0812)  Safety Promotion/Fall Prevention: in recliner, wheels locked  Goal: Optimal Comfort and Wellbeing  Outcome: Ongoing, Progressing  Intervention: Provide Person-Centered Care  Flowsheets (Taken 4/18/2024 0812)  Trust Relationship/Rapport:   care explained   questions encouraged   choices provided   reassurance provided   emotional support provided   thoughts/feelings acknowledged   empathic listening provided   questions answered

## 2024-04-18 NOTE — NURSING
Infusion # >10 - Actemra 800 mg q 4 weeks  Last dose- 3/21/24    Any:  -recent illness, infection, or antibiotic use in past week- denies  -open wounds or mouth sores- denies  -invasive procedures or surgeries in past 4 weeks or in upcoming 4 weeks- denies  -vaccinations in past week- denies  -any new symptoms/change in symptoms-denies  -chance you may be pregnant- n/a      Recent labs? 4/18/24  Last Rheumatology provider visit- Seen by Dr. LÓPEZ on 1/17/24     Premeds-none     Actemra 800 mg administered IV at a 60 minute rate per orders; see MAR and vitals for more details. Tolerated well without adverse events, discharged and ambulatory out of clinic.

## 2024-04-24 ENCOUNTER — PATIENT MESSAGE (OUTPATIENT)
Dept: ADMINISTRATIVE | Facility: OTHER | Age: 49
End: 2024-04-24
Payer: MEDICARE

## 2024-04-30 ENCOUNTER — LAB VISIT (OUTPATIENT)
Dept: LAB | Facility: HOSPITAL | Age: 49
End: 2024-04-30
Attending: INTERNAL MEDICINE
Payer: MEDICARE

## 2024-04-30 DIAGNOSIS — M06.09 RHEUMATOID ARTHRITIS OF MULTIPLE SITES WITH NEGATIVE RHEUMATOID FACTOR: ICD-10-CM

## 2024-04-30 DIAGNOSIS — D84.821 DRUG-INDUCED IMMUNODEFICIENCY: ICD-10-CM

## 2024-04-30 DIAGNOSIS — Z51.81 ENCOUNTER FOR MEDICATION MONITORING: ICD-10-CM

## 2024-04-30 DIAGNOSIS — Z79.899 DRUG-INDUCED IMMUNODEFICIENCY: ICD-10-CM

## 2024-04-30 DIAGNOSIS — M31.7 MICROSCOPIC POLYANGIITIS: ICD-10-CM

## 2024-04-30 LAB
ALBUMIN SERPL BCP-MCNC: 4.4 G/DL (ref 3.5–5.2)
ALP SERPL-CCNC: 75 U/L (ref 55–135)
ALT SERPL W/O P-5'-P-CCNC: 33 U/L (ref 10–44)
ANION GAP SERPL CALC-SCNC: 8 MMOL/L (ref 8–16)
AST SERPL-CCNC: 21 U/L (ref 10–40)
BASOPHILS # BLD AUTO: 0.03 K/UL (ref 0–0.2)
BASOPHILS NFR BLD: 0.2 % (ref 0–1.9)
BILIRUB SERPL-MCNC: 0.4 MG/DL (ref 0.1–1)
BUN SERPL-MCNC: 24 MG/DL (ref 6–20)
CALCIUM SERPL-MCNC: 9.6 MG/DL (ref 8.7–10.5)
CHLORIDE SERPL-SCNC: 106 MMOL/L (ref 95–110)
CO2 SERPL-SCNC: 23 MMOL/L (ref 23–29)
CREAT SERPL-MCNC: 1.5 MG/DL (ref 0.5–1.4)
CRP SERPL-MCNC: 0.2 MG/L (ref 0–8.2)
DIFFERENTIAL METHOD BLD: ABNORMAL
EOSINOPHIL # BLD AUTO: 0 K/UL (ref 0–0.5)
EOSINOPHIL NFR BLD: 0.2 % (ref 0–8)
ERYTHROCYTE [DISTWIDTH] IN BLOOD BY AUTOMATED COUNT: 12.7 % (ref 11.5–14.5)
ERYTHROCYTE [SEDIMENTATION RATE] IN BLOOD BY PHOTOMETRIC METHOD: 15 MM/HR (ref 0–36)
EST. GFR  (NO RACE VARIABLE): 42 ML/MIN/1.73 M^2
GLUCOSE SERPL-MCNC: 104 MG/DL (ref 70–110)
HCT VFR BLD AUTO: 35.9 % (ref 37–48.5)
HGB BLD-MCNC: 12 G/DL (ref 12–16)
IMM GRANULOCYTES # BLD AUTO: 0.07 K/UL (ref 0–0.04)
IMM GRANULOCYTES NFR BLD AUTO: 0.6 % (ref 0–0.5)
LYMPHOCYTES # BLD AUTO: 3.3 K/UL (ref 1–4.8)
LYMPHOCYTES NFR BLD: 25.8 % (ref 18–48)
MCH RBC QN AUTO: 29.9 PG (ref 27–31)
MCHC RBC AUTO-ENTMCNC: 33.4 G/DL (ref 32–36)
MCV RBC AUTO: 90 FL (ref 82–98)
MONOCYTES # BLD AUTO: 1.3 K/UL (ref 0.3–1)
MONOCYTES NFR BLD: 9.8 % (ref 4–15)
NEUTROPHILS # BLD AUTO: 8 K/UL (ref 1.8–7.7)
NEUTROPHILS NFR BLD: 63.4 % (ref 38–73)
NRBC BLD-RTO: 0 /100 WBC
PLATELET # BLD AUTO: 255 K/UL (ref 150–450)
PMV BLD AUTO: 11.3 FL (ref 9.2–12.9)
POTASSIUM SERPL-SCNC: 4.3 MMOL/L (ref 3.5–5.1)
PROT SERPL-MCNC: 7.6 G/DL (ref 6–8.4)
RBC # BLD AUTO: 4.01 M/UL (ref 4–5.4)
SODIUM SERPL-SCNC: 137 MMOL/L (ref 136–145)
WBC # BLD AUTO: 12.7 K/UL (ref 3.9–12.7)

## 2024-04-30 PROCEDURE — 36415 COLL VENOUS BLD VENIPUNCTURE: CPT | Performed by: INTERNAL MEDICINE

## 2024-04-30 PROCEDURE — 85652 RBC SED RATE AUTOMATED: CPT | Performed by: INTERNAL MEDICINE

## 2024-04-30 PROCEDURE — 86140 C-REACTIVE PROTEIN: CPT | Performed by: INTERNAL MEDICINE

## 2024-04-30 PROCEDURE — 80053 COMPREHEN METABOLIC PANEL: CPT | Performed by: INTERNAL MEDICINE

## 2024-04-30 PROCEDURE — 85025 COMPLETE CBC W/AUTO DIFF WBC: CPT | Performed by: INTERNAL MEDICINE

## 2024-05-07 ENCOUNTER — OFFICE VISIT (OUTPATIENT)
Dept: RHEUMATOLOGY | Facility: CLINIC | Age: 49
End: 2024-05-07
Payer: MEDICARE

## 2024-05-07 DIAGNOSIS — M06.09 RHEUMATOID ARTHRITIS OF MULTIPLE SITES WITH NEGATIVE RHEUMATOID FACTOR: ICD-10-CM

## 2024-05-07 DIAGNOSIS — D84.821 DRUG-INDUCED IMMUNODEFICIENCY: ICD-10-CM

## 2024-05-07 DIAGNOSIS — Z51.81 ENCOUNTER FOR MEDICATION MONITORING: ICD-10-CM

## 2024-05-07 DIAGNOSIS — M31.7 MICROSCOPIC POLYANGIITIS: Primary | ICD-10-CM

## 2024-05-07 DIAGNOSIS — M79.7 FIBROMYALGIA: ICD-10-CM

## 2024-05-07 DIAGNOSIS — Z79.899 DRUG-INDUCED IMMUNODEFICIENCY: ICD-10-CM

## 2024-05-07 DIAGNOSIS — E66.01 CLASS 3 SEVERE OBESITY DUE TO EXCESS CALORIES WITH SERIOUS COMORBIDITY AND BODY MASS INDEX (BMI) OF 40.0 TO 44.9 IN ADULT: ICD-10-CM

## 2024-05-07 PROCEDURE — 4010F ACE/ARB THERAPY RXD/TAKEN: CPT | Mod: CPTII,95,, | Performed by: INTERNAL MEDICINE

## 2024-05-07 PROCEDURE — 1160F RVW MEDS BY RX/DR IN RCRD: CPT | Mod: CPTII,95,, | Performed by: INTERNAL MEDICINE

## 2024-05-07 PROCEDURE — 99215 OFFICE O/P EST HI 40 MIN: CPT | Mod: 95,,, | Performed by: INTERNAL MEDICINE

## 2024-05-07 PROCEDURE — 1159F MED LIST DOCD IN RCRD: CPT | Mod: CPTII,95,, | Performed by: INTERNAL MEDICINE

## 2024-05-07 RX ORDER — MYCOPHENOLATE MOFETIL 500 MG/1
1500 TABLET ORAL 2 TIMES DAILY
Qty: 180 TABLET | Refills: 3 | Status: SHIPPED | OUTPATIENT
Start: 2024-05-07 | End: 2024-05-22 | Stop reason: SDUPTHER

## 2024-05-07 NOTE — PROGRESS NOTES
RHEUMATOLOGY FOLLOW UP - TELE VISIT     The patient location is: [place]  The chief complaint leading to consultation is:  Follow-up visit  Visit type: Virtual visit with synchronous audio and video  Total time spent with patient:  15 minutes  Each patient to whom he or she provides medical services by telemedicine is:  (1) informed of the relationship between the physician and patient and the respective role of any other health care provider with respect to management of the patient; and (2) notified that he or she may decline to receive medical services by telemedicine and may withdraw from such care at any time.    Chief complaints, HPI, ROS, EXAM, Assessment & Plans:-  Denise JoVillalbaelaine a 48 y.o. pleasant female seen today for follow-up visit.Microscopic polyangiitis on Actemra infusion and CellCept after completing Cytoxan induction therapy.  Complains of worsening diffuse pain and joint pain since last visit..  Lost multiple family members in the past couple of months..   Taking CellCept 1000 mg twice daily and Actemra every 4 week infusion.  Rheumatological review of system negative otherwise.  90% fist formation bilateral hands.    1. Microscopic polyangiitis    2. Drug-induced immunodeficiency    3. Encounter for medication monitoring    4. Class 3 severe obesity due to excess calories with serious comorbidity and body mass index (BMI) of 40.0 to 44.9 in adult    5. Fibromyalgia    6. Rheumatoid arthritis of multiple sites with negative rheumatoid factor      Problem List Items Addressed This Visit       Class 3 severe obesity due to excess calories with serious comorbidity and body mass index (BMI) of 40.0 to 44.9 in adult    Drug-induced immunodeficiency    Encounter for medication monitoring    Fibromyalgia    Microscopic polyangiitis - Primary    Relevant Medications    mycophenolate (CELLCEPT) 500 mg Tab    Other Relevant Orders    Protein/Creatinine Ratio, Urine    Urinalysis    Comprehensive Metabolic  Panel    Proteinase 3 Autoantibodies    Myeloperoxidase Antibody (MPO)    Rheumatoid arthritis of multiple sites with negative rheumatoid factor    Relevant Medications    mycophenolate (CELLCEPT) 500 mg Tab      Latest Reference Range & Units Most Recent   ED Screen Negative <1:80  Positive !  10/12/22 13:38   ED Titer 1  1:640  10/12/22 13:38   ED PATTERN 1  Homogeneous  10/12/22 13:38   ds DNA Ab Negative 1:10  Negative 1:10  10/12/22 13:38   Anti-SSA Antibody 0.00 - 0.99 Ratio 0.07  10/12/22 13:38   Anti-SSA Interpretation Negative  Negative  10/12/22 13:38   Anti-SSB Antibody 0.00 - 0.99 Ratio 0.06  10/12/22 13:38   Anti-SSB Interpretation Negative  Negative  10/12/22 13:38   Anti Sm Antibody 0.00 - 0.99 Ratio 0.09  10/12/22 13:38   Anti-Sm Interpretation Negative  Negative  10/12/22 13:38   Anti Sm/RNP Antibody 0.00 - 0.99 Ratio 0.11  10/12/22 13:38   Anti-Sm/RNP Interpretation Negative  Negative  10/12/22 13:38   6- - 450 pmol/8x10(8)RBC 73 (L)  12/15/21 10:29   ANCA Proteinase 3 <0.4 (Negative) U <0.2  10/12/22 13:38   Cytoplasmic Neutrophilic Ab <1:20 Titer See Below !  10/12/22 13:38   MPO <=20 UNITS 22 (H)  10/12/22 13:38   Perinuclear (P-ANCA) <1:20 Titer See Below !  10/12/22 13:38   CCP Antibodies <5.0 U/mL <0.5  10/12/22 13:38   Complement (C-3) 50 - 180 mg/dL 139  5/26/21 17:23   Complement (C-4) 11 - 44 mg/dL 25  5/26/21 17:23   IgG 650 - 1600 mg/dL 1096  12/15/21 10:29   IgM 50 - 300 mg/dL 78  12/15/21 10:29   IgA 40 - 350 mg/dL 387 (H)  12/15/21 10:29   Protein, Serum 6.0 - 8.4 g/dL 7.1  12/15/21 10:29   Albumin grams/dl 3.35 - 5.55 g/dL 3.76  12/15/21 10:29   Alpha-1 grams/dl 0.17 - 0.41 g/dL 0.37  12/15/21 10:29   Alpha-2 0.43 - 0.99 g/dL 0.88  12/15/21 10:29   Beta 0.50 - 1.10 g/dL 0.99  12/15/21 10:29   Gamma 0.67 - 1.58 g/dL 1.10  12/15/21 10:29   Pathologist Interpretation SPE  REVIEWED  12/15/21 10:29   Pathologist Interpretation SIDDHARTH  REVIEWED  12/15/21 10:29   Immunofix Interp.   SEE COMMENT  12/15/21 10:29   Interleukin 6 <=1.8 pg/mL <1.0  10/12/22 13:38   Rheumatoid Factor 0.0 - 15.0 IU/mL <13.0  10/12/22 13:38   !: Data is abnormal  (L): Data is abnormally low  (H): Data is abnormally high    Mild flare .  On maximum dose of Actemra.  Increase CellCept 1500 mg twice daily.  Repeat safety labs in 4 weeks.  Continue Actemra infusion every 4 weeks.  Mild elevation of serum creatinine.  Well controlled hypertension.  No diabetes.  Check urinalysis, urine protein creatinine ratio and serologies in 2 weeks.  If worsening renal functions, consult Nephrology.  Advised low carb diet.  Drug induced immunodeficiency due to use of immunosuppressive drugs. Monitor carefully for infections. Advised to get immediate medical care if any infection. Also advised strict adherence to age appropriate vaccinations and cancer screenings with PCP.  Hold Actemra and CellCept if any infection.  I have explained all of the above in detail and the patient understands all of the current recommendation(s). I have answered all questions to the best of my ability and to their complete satisfaction.   # No follow-ups on file.      Past Medical History:   Diagnosis Date    Antiphospholipid antibody with hypercoagulable state 11/10/2022    Hypertension     Microscopic polyangiitis 2020    Stroke            Past Surgical History:   Procedure Laterality Date     SECTION      TUBAL LIGATION          Social History     Tobacco Use    Smoking status: Never    Smokeless tobacco: Never   Substance Use Topics    Alcohol use: Not Currently       Family History   Problem Relation Name Age of Onset    Hypertension Mother      Cancer Father          esophegal       Review of patient's allergies indicates:   Allergen Reactions    Oranges [orange] Hives       Medication List with Changes/Refills   Current Medications    ALBUTEROL (PROVENTIL/VENTOLIN HFA) 90 MCG/ACTUATION INHALER    2 puffs as needed.    ALPRAZOLAM  (XANAX) 0.25 MG TABLET    Take 0.25 mg by mouth Daily.    ASPIRIN 325 MG TABLET        ATORVASTATIN (LIPITOR) 10 MG TABLET        BUMETANIDE (BUMEX) 1 MG TABLET    Take 1 mg by mouth 2 (two) times a day.    CHOLECALCIFEROL, VITAMIN D3, 1,250 MCG (50,000 UNIT) CAPSULE    TAKE 1 CAPSULE BY MOUTH ONCE A WEEK FOR VIT. D DEFICIENCY    CLONAZEPAM (KLONOPIN) 1 MG TABLET    TAKE 1 TABLET BY MOUTH ONCE DAILY AT BEDTIME AS NEEDED FOR ANXIETY SLEEP    CLONIDINE (CATAPRES) 0.2 MG TABLET    Take 0.2 mg by mouth 3 (three) times daily.    CYCLOBENZAPRINE (FLEXERIL) 10 MG TABLET    TAKE 1 TABLET THREE TIMES DAILY AS NEEDED FOR MUSCLE SPASM(S)    DULOXETINE (CYMBALTA) 20 MG CAPSULE    Take 20 mg by mouth once daily.    FEROSUL 325 MG (65 MG IRON) TAB TABLET    TAKE 2 TABLETS BY MOUTH DAILY FOR IRON DEFICIENCY    FLUCONAZOLE (DIFLUCAN) 150 MG TAB    Take 150 mg by mouth once daily.    HYDRALAZINE (APRESOLINE) 50 MG TABLET    Take 50 mg by mouth every 12 (twelve) hours.    IPRATROPIUM (ATROVENT) 42 MCG (0.06 %) NASAL SPRAY    SPRAY 2 SPRAYS IN EACH NOSTRIL IN THE MORNING, 2 SPRAYS AT NOON, 2 SPRAYS IN THE EVENING, AND 2 SPRAYS BEFORE BEDTIME    METHYLPREDNISOLONE (MEDROL DOSEPACK) 4 MG TABLET    use as directed    NEBIVOLOL (BYSTOLIC) 20 MG TAB    Take 20 mg by mouth 2 (two) times a day.    NIFEDIPINE (PROCARDIA-XL) 60 MG (OSM) 24 HR TABLET    Take 60 mg by mouth once daily.    ONDANSETRON (ZOFRAN) 4 MG TABLET    Take 1 tablet (4 mg total) by mouth every 8 (eight) hours as needed for Nausea.    POTASSIUM CHLORIDE SA (K-DUR,KLOR-CON) 20 MEQ TABLET    Take 1 tablet (20 mEq total) by mouth 2 (two) times daily.    PROMETHAZINE-DEXTROMETHORPHAN (PROMETHAZINE-DM) 6.25-15 MG/5 ML SYRP    5 ml as needed    SERTRALINE (ZOLOFT) 100 MG TABLET        SPIRONOLACTONE (ALDACTONE) 100 MG TABLET    Take 1 tablet by mouth every morning.    TEMAZEPAM (RESTORIL) 15 MG CAP    30 mg.    TIZANIDINE (ZANAFLEX) 4 MG TABLET    TAKE 1 TO 2 TABLETS BY MOUTH  THREE TIMES DAILY AS NEEDED FOR PAIN    TOBRAMYCIN-DEXAMETHASONE 0.3-0.1% (TOBRADEX) 0.3-0.1 % DRPS    1 drop into affected eye    TRAZODONE (DESYREL) 50 MG TABLET    Take 1 tablet (50 mg total) by mouth every evening.    VALSARTAN (DIOVAN) 160 MG TABLET    Take 1 tablet (160 mg total) by mouth 2 (two) times daily.   Changed and/or Refilled Medications    Modified Medication Previous Medication    MYCOPHENOLATE (CELLCEPT) 500 MG TAB mycophenolate (CELLCEPT) 500 mg Tab       Take 3 tablets (1,500 mg total) by mouth 2 (two) times daily.    Take 2 tablets (1,000 mg total) by mouth 2 (two) times daily.   Discontinued Medications    MELOXICAM (MOBIC) 7.5 MG TABLET    1 tablet.       Disclaimer: This note was prepared using voice recognition system and is likely to have sound alike errors and is not proof read.  Please call me with any questions.

## 2024-05-16 ENCOUNTER — INFUSION (OUTPATIENT)
Dept: INFUSION THERAPY | Facility: HOSPITAL | Age: 49
End: 2024-05-16
Attending: INTERNAL MEDICINE
Payer: MEDICARE

## 2024-05-16 VITALS
RESPIRATION RATE: 18 BRPM | OXYGEN SATURATION: 99 % | BODY MASS INDEX: 44.11 KG/M2 | DIASTOLIC BLOOD PRESSURE: 71 MMHG | HEART RATE: 76 BPM | WEIGHT: 241.19 LBS | SYSTOLIC BLOOD PRESSURE: 101 MMHG | TEMPERATURE: 97 F

## 2024-05-16 DIAGNOSIS — M06.09 RHEUMATOID ARTHRITIS OF MULTIPLE SITES WITH NEGATIVE RHEUMATOID FACTOR: Primary | ICD-10-CM

## 2024-05-16 PROCEDURE — 25000003 PHARM REV CODE 250: Performed by: INTERNAL MEDICINE

## 2024-05-16 PROCEDURE — 63600175 PHARM REV CODE 636 W HCPCS: Mod: JZ,JG | Performed by: INTERNAL MEDICINE

## 2024-05-16 PROCEDURE — 96365 THER/PROPH/DIAG IV INF INIT: CPT

## 2024-05-16 RX ORDER — HEPARIN 100 UNIT/ML
500 SYRINGE INTRAVENOUS
Status: CANCELLED | OUTPATIENT
Start: 2024-06-13

## 2024-05-16 RX ORDER — SODIUM CHLORIDE 0.9 % (FLUSH) 0.9 %
10 SYRINGE (ML) INJECTION
Status: CANCELLED | OUTPATIENT
Start: 2024-06-13

## 2024-05-16 RX ADMIN — TOCILIZUMAB 800 MG: 20 INJECTION, SOLUTION, CONCENTRATE INTRAVENOUS at 09:05

## 2024-05-16 NOTE — PLAN OF CARE
Discussed plan of care with pt. Addressed any and ongoing concerns. Pt denies    Problem: Adult Inpatient Plan of Care  Goal: Plan of Care Review  Outcome: Progressing  Flowsheets (Taken 5/16/2024 1001)  Plan of Care Reviewed With: patient  Goal: Absence of Hospital-Acquired Illness or Injury  Outcome: Progressing  Intervention: Identify and Manage Fall Risk  Flowsheets (Taken 5/16/2024 1001)  Safety Promotion/Fall Prevention:   in recliner, wheels locked   nonskid shoes/socks when out of bed   room near unit station  Intervention: Prevent Infection  Flowsheets (Taken 5/16/2024 1001)  Infection Prevention:   hand hygiene promoted   equipment surfaces disinfected  Goal: Optimal Comfort and Wellbeing  Outcome: Progressing  Intervention: Monitor Pain and Promote Comfort  Flowsheets (Taken 5/16/2024 1001)  Pain Management Interventions:   warm blanket provided   quiet environment facilitated   relaxation techniques promoted  Intervention: Provide Person-Centered Care  Flowsheets (Taken 5/16/2024 1001)  Trust Relationship/Rapport:   care explained   questions encouraged   reassurance provided   choices provided   emotional support provided   empathic listening provided   thoughts/feelings acknowledged   questions answered

## 2024-05-22 ENCOUNTER — TELEPHONE (OUTPATIENT)
Dept: RHEUMATOLOGY | Facility: CLINIC | Age: 49
End: 2024-05-22
Payer: MEDICARE

## 2024-05-22 DIAGNOSIS — M31.7 MICROSCOPIC POLYANGIITIS: ICD-10-CM

## 2024-05-22 DIAGNOSIS — M06.09 RHEUMATOID ARTHRITIS OF MULTIPLE SITES WITH NEGATIVE RHEUMATOID FACTOR: ICD-10-CM

## 2024-05-22 RX ORDER — MYCOPHENOLATE MOFETIL 500 MG/1
1500 TABLET ORAL 2 TIMES DAILY
Qty: 180 TABLET | Refills: 3 | Status: ACTIVE | OUTPATIENT
Start: 2024-05-22

## 2024-05-22 RX ORDER — MYCOPHENOLATE MOFETIL 500 MG/1
1000 TABLET ORAL 2 TIMES DAILY
Qty: 120 TABLET | Refills: 3 | Status: SHIPPED | OUTPATIENT
Start: 2024-05-22 | End: 2024-05-22 | Stop reason: DRUGHIGH

## 2024-05-22 NOTE — TELEPHONE ENCOUNTER
----- Message from Nessa Baptiste PharmD sent at 5/22/2024  2:20 PM CDT -----  Regarding: Cellcept prescription  Appears that at appt on 5/7 a prescription was printed to increase the patient's dose to 1500mg bid. Today a request was approved and sent to [facility] 1000mg bid. I assume that you would like the patient to get 1500mg bid. Would you like me to send you a refill request for that dose so you can approve that one?    Thanks,  Nessa

## 2024-06-04 ENCOUNTER — OFFICE VISIT (OUTPATIENT)
Dept: RHEUMATOLOGY | Facility: CLINIC | Age: 49
End: 2024-06-04
Payer: MEDICARE

## 2024-06-04 DIAGNOSIS — M79.7 FIBROMYALGIA: ICD-10-CM

## 2024-06-04 DIAGNOSIS — Z79.899 DRUG-INDUCED IMMUNODEFICIENCY: ICD-10-CM

## 2024-06-04 DIAGNOSIS — M31.7 MICROSCOPIC POLYANGIITIS: Primary | ICD-10-CM

## 2024-06-04 DIAGNOSIS — M25.562 ACUTE PAIN OF LEFT KNEE: ICD-10-CM

## 2024-06-04 DIAGNOSIS — D84.821 DRUG-INDUCED IMMUNODEFICIENCY: ICD-10-CM

## 2024-06-04 DIAGNOSIS — E66.01 CLASS 3 SEVERE OBESITY DUE TO EXCESS CALORIES WITH SERIOUS COMORBIDITY AND BODY MASS INDEX (BMI) OF 40.0 TO 44.9 IN ADULT: ICD-10-CM

## 2024-06-04 DIAGNOSIS — Z51.81 ENCOUNTER FOR MEDICATION MONITORING: ICD-10-CM

## 2024-06-04 PROCEDURE — 1159F MED LIST DOCD IN RCRD: CPT | Mod: CPTII,95,, | Performed by: INTERNAL MEDICINE

## 2024-06-04 PROCEDURE — 99215 OFFICE O/P EST HI 40 MIN: CPT | Mod: 95,,, | Performed by: INTERNAL MEDICINE

## 2024-06-04 PROCEDURE — 1160F RVW MEDS BY RX/DR IN RCRD: CPT | Mod: CPTII,95,, | Performed by: INTERNAL MEDICINE

## 2024-06-04 PROCEDURE — 4010F ACE/ARB THERAPY RXD/TAKEN: CPT | Mod: CPTII,95,, | Performed by: INTERNAL MEDICINE

## 2024-06-04 RX ORDER — GABAPENTIN 600 MG/1
600 TABLET ORAL 3 TIMES DAILY
Qty: 90 TABLET | Refills: 11 | Status: SHIPPED | OUTPATIENT
Start: 2024-06-04 | End: 2025-06-04

## 2024-06-04 NOTE — PROGRESS NOTES
RHEUMATOLOGY FOLLOW UP - TELE VISIT     The patient location is: [place]  The chief complaint leading to consultation is:  Follow-up visit  Visit type: Virtual visit with synchronous audio and video  Total time spent with patient:  15 minutes  Each patient to whom he or she provides medical services by telemedicine is:  (1) informed of the relationship between the physician and patient and the respective role of any other health care provider with respect to management of the patient; and (2) notified that he or she may decline to receive medical services by telemedicine and may withdraw from such care at any time.    Chief complaints, HPI, ROS, EXAM, Assessment & Plans:-  Denise JoOrrtannaelaine a 49 y.o. pleasant female seen today for follow-up visit. Microscopic polyangiitis  with joint and renal involvement on Actemra  and CellCept  after completing Cytoxan induction therapy.  Complains of worsening diffuse pain and joint pain .   Significant family stressors present.  Acute onset worsening left knee pain for the past couple of weeks.  No injury.  Went to urgent care.  Evaluation showed no fracture or swelling.  CellCept dose increase last visit to 1500 mg b.I.d. while continuing Actemra every 4 week infusion.  Rheumatological review of system negative otherwise.   100% fist formation bilateral hands.    1. Microscopic polyangiitis    2. Drug-induced immunodeficiency    3. Encounter for medication monitoring    4. Class 3 severe obesity due to excess calories with serious comorbidity and body mass index (BMI) of 40.0 to 44.9 in adult    5. Acute pain of left knee    6. Fibromyalgia      Problem List Items Addressed This Visit       Acute pain of left knee    Relevant Orders    X-Ray Knee 3 View Bilateral    Ambulatory referral/consult to Sports Medicine    Class 3 severe obesity due to excess calories with serious comorbidity and body mass index (BMI) of 40.0 to 44.9 in adult    Drug-induced immunodeficiency     Encounter for medication monitoring    Fibromyalgia    Relevant Medications    gabapentin (NEURONTIN) 600 MG tablet    Microscopic polyangiitis - Primary      Latest Reference Range & Units Most Recent   ED Screen Negative <1:80  Positive !  10/12/22 13:38   ED Titer 1  1:640  10/12/22 13:38   ED PATTERN 1  Homogeneous  10/12/22 13:38   ds DNA Ab Negative 1:10  Negative 1:10  10/12/22 13:38   Anti-SSA Antibody 0.00 - 0.99 Ratio 0.07  10/12/22 13:38   Anti-SSA Interpretation Negative  Negative  10/12/22 13:38   Anti-SSB Antibody 0.00 - 0.99 Ratio 0.06  10/12/22 13:38   Anti-SSB Interpretation Negative  Negative  10/12/22 13:38   Anti Sm Antibody 0.00 - 0.99 Ratio 0.09  10/12/22 13:38   Anti-Sm Interpretation Negative  Negative  10/12/22 13:38   Anti Sm/RNP Antibody 0.00 - 0.99 Ratio 0.11  10/12/22 13:38   Anti-Sm/RNP Interpretation Negative  Negative  10/12/22 13:38   6- - 450 pmol/8x10(8)RBC 73 (L)  12/15/21 10:29   ANCA Proteinase 3 <0.4 (Negative) U <0.2  10/12/22 13:38   Cytoplasmic Neutrophilic Ab <1:20 Titer See Below !  10/12/22 13:38   MPO <=20 UNITS 22 (H)  10/12/22 13:38   Perinuclear (P-ANCA) <1:20 Titer See Below !  10/12/22 13:38   CCP Antibodies <5.0 U/mL <0.5  10/12/22 13:38   Complement (C-3) 50 - 180 mg/dL 139  5/26/21 17:23   Complement (C-4) 11 - 44 mg/dL 25  5/26/21 17:23   IgG 650 - 1600 mg/dL 1096  12/15/21 10:29   IgM 50 - 300 mg/dL 78  12/15/21 10:29   IgA 40 - 350 mg/dL 387 (H)  12/15/21 10:29   Protein, Serum 6.0 - 8.4 g/dL 7.1  12/15/21 10:29   Albumin grams/dl 3.35 - 5.55 g/dL 3.76  12/15/21 10:29   Alpha-1 grams/dl 0.17 - 0.41 g/dL 0.37  12/15/21 10:29   Alpha-2 0.43 - 0.99 g/dL 0.88  12/15/21 10:29   Beta 0.50 - 1.10 g/dL 0.99  12/15/21 10:29   Gamma 0.67 - 1.58 g/dL 1.10  12/15/21 10:29   Pathologist Interpretation SPE  REVIEWED  12/15/21 10:29   Pathologist Interpretation SIDDHARTH  REVIEWED  12/15/21 10:29   Immunofix Interp.  SEE COMMENT  12/15/21 10:29   Interleukin 6 <=1.8 pg/mL  <1.0  10/12/22 13:38   Rheumatoid Factor 0.0 - 15.0 IU/mL <13.0  10/12/22 13:38   !: Data is abnormal  (L): Data is abnormally low  (H): Data is abnormally high     Latest Reference Range & Units 05/16/24 09:19 05/16/24 10:18 05/16/24 10:19   WBC 3.90 - 12.70 K/uL  4.09    RBC 4.00 - 5.40 M/uL  4.09    Hemoglobin 12.0 - 16.0 g/dL  12.4    Hematocrit 37.0 - 48.5 %  37.4    MCV 82 - 98 fL  91    MCH 27.0 - 31.0 pg  30.3    MCHC 32.0 - 36.0 g/dL  33.2    RDW 11.5 - 14.5 %  12.9    Platelet Count 150 - 450 K/uL  222    MPV 9.2 - 12.9 fL  11.7    Gran % 38.0 - 73.0 %  35.9 (L)    Lymph % 18.0 - 48.0 %  42.1    Mono % 4.0 - 15.0 %  17.6 (H)    Eos % 0.0 - 8.0 %  3.7    Basophil % 0.0 - 1.9 %  0.7    Immature Granulocytes 0.0 - 0.5 %  0.0    Gran # (ANC) 1.8 - 7.7 K/uL  1.5 (L)    Lymph # 1.0 - 4.8 K/uL  1.7    Mono # 0.3 - 1.0 K/uL  0.7    Eos # 0.0 - 0.5 K/uL  0.2    Baso # 0.00 - 0.20 K/uL  0.03    Immature Grans (Abs) 0.00 - 0.04 K/uL  0.00    nRBC 0 /100 WBC  0    Differential Method   Automated    Sodium 136 - 145 mmol/L  139    Potassium 3.5 - 5.1 mmol/L  3.5    Chloride 95 - 110 mmol/L  105    CO2 23 - 29 mmol/L  26    Anion Gap 8 - 16 mmol/L  8    BUN 6 - 20 mg/dL  16    Creatinine 0.5 - 1.4 mg/dL  1.2    eGFR >60 mL/min/1.73 m^2  55 !    Glucose 70 - 110 mg/dL  87    Calcium 8.7 - 10.5 mg/dL  9.3    ALP 55 - 135 U/L  59    PROTEIN TOTAL 6.0 - 8.4 g/dL  7.1    Albumin 3.5 - 5.2 g/dL  4.1    BILIRUBIN TOTAL 0.1 - 1.0 mg/dL  0.5    AST 10 - 40 U/L  18    ALT 10 - 44 U/L  29    Proteinase-3 Ab <0.4 (Negative) U   <0.2   MPO <3.5 U/mL   7.7 (H)   Specimen UA  Urine, Clean Catch     Color, UA Yellow, Straw, Carolann  Yellow     Appearance, UA Clear  Clear     Spec Grav UA 1.005 - 1.030  1.015     pH, UA 5.0 - 8.0  6.0     Protein, UA Negative  Negative     Glucose, UA Negative  Negative     Ketones, UA Negative  Negative     Blood, UA Negative  Negative     NITRITE UA Negative  Negative     Bilirubin (UA) Negative   Negative     Leukocyte Esterase, UA Negative  1+ !     RBC, UA 0 - 4 /hpf 1     WBC, UA 0 - 5 /hpf 4     Bacteria, UA None-Occ /hpf Occasional     Microscopic Comment  SEE COMMENT     Urine Creatinine 15.0 - 325.0 mg/dL 45.0     Urine Protein 0 - 15 mg/dL <7     Urine Protein/Creatinine Ratio 0.00 - 0.20  Unable to calculate     (L): Data is abnormally low  (H): Data is abnormally high  !: Data is abnormal       Severe worsening acute onset left knee pain.  X-ray bilateral knee for comparison.  Consult sports medicine since left knee pain not suggestive of arthritis flare.    Severe worsening diffuse myofascial pain with significant family stressor.  Worsening fibromyalgia.  Restart gabapentin.    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  Creatinine improved with increase dose of CellCept.  Continue 1500 mg b.i.d. CellCept and every 4 week Actemra infusion with safety labs every 12 weeks.  Drug induced immunodeficiency due to use of immunosuppressive drugs. Monitor carefully for infections. Advised to get immediate medical care if any infection. Also advised strict adherence to age appropriate vaccinations and cancer screenings with PCP.  Hold Actemra and CellCept if any infection.  I have explained all of the above in detail and the patient understands all of the current recommendation(s). I have answered all questions to the best of my ability and to their complete satisfaction.   # Follow up in about 3 months (around 2024).      Past Medical History:   Diagnosis Date    Antiphospholipid antibody with hypercoagulable state 11/10/2022    Hypertension     Microscopic polyangiitis 2020    Stroke            Past Surgical History:   Procedure Laterality Date     SECTION      TUBAL LIGATION          Social History     Tobacco Use    Smoking status: Never    Smokeless tobacco: Never   Substance Use Topics    Alcohol  use: Not Currently       Family History   Problem Relation Name Age of Onset    Hypertension Mother      Cancer Father          osiel       Review of patient's allergies indicates:   Allergen Reactions    Oranges [orange] Hives       Medication List with Changes/Refills   New Medications    GABAPENTIN (NEURONTIN) 600 MG TABLET    Take 1 tablet (600 mg total) by mouth 3 (three) times daily.   Current Medications    ALBUTEROL (PROVENTIL/VENTOLIN HFA) 90 MCG/ACTUATION INHALER    2 puffs as needed.    ALPRAZOLAM (XANAX) 0.25 MG TABLET    Take 0.25 mg by mouth Daily.    ASPIRIN 325 MG TABLET        ATORVASTATIN (LIPITOR) 10 MG TABLET        BUMETANIDE (BUMEX) 1 MG TABLET    Take 1 mg by mouth 2 (two) times a day.    CHOLECALCIFEROL, VITAMIN D3, 1,250 MCG (50,000 UNIT) CAPSULE    TAKE 1 CAPSULE BY MOUTH ONCE A WEEK FOR VIT. D DEFICIENCY    CLONAZEPAM (KLONOPIN) 1 MG TABLET    TAKE 1 TABLET BY MOUTH ONCE DAILY AT BEDTIME AS NEEDED FOR ANXIETY SLEEP    CLONIDINE (CATAPRES) 0.2 MG TABLET    Take 0.2 mg by mouth 3 (three) times daily.    CYCLOBENZAPRINE (FLEXERIL) 10 MG TABLET    TAKE 1 TABLET THREE TIMES DAILY AS NEEDED FOR MUSCLE SPASM(S)    DULOXETINE (CYMBALTA) 20 MG CAPSULE    Take 20 mg by mouth once daily.    FEROSUL 325 MG (65 MG IRON) TAB TABLET    TAKE 2 TABLETS BY MOUTH DAILY FOR IRON DEFICIENCY    FLUCONAZOLE (DIFLUCAN) 150 MG TAB    Take 150 mg by mouth once daily.    HYDRALAZINE (APRESOLINE) 50 MG TABLET    Take 50 mg by mouth every 12 (twelve) hours.    IPRATROPIUM (ATROVENT) 42 MCG (0.06 %) NASAL SPRAY    SPRAY 2 SPRAYS IN EACH NOSTRIL IN THE MORNING, 2 SPRAYS AT NOON, 2 SPRAYS IN THE EVENING, AND 2 SPRAYS BEFORE BEDTIME    METHYLPREDNISOLONE (MEDROL DOSEPACK) 4 MG TABLET    use as directed    MYCOPHENOLATE (CELLCEPT) 500 MG TAB    Take 3 tablets (1,500 mg total) by mouth 2 (two) times daily.    NEBIVOLOL (BYSTOLIC) 20 MG TAB    Take 20 mg by mouth 2 (two) times a day.    NIFEDIPINE (PROCARDIA-XL) 60 MG  (OSM) 24 HR TABLET    Take 60 mg by mouth once daily.    ONDANSETRON (ZOFRAN) 4 MG TABLET    Take 1 tablet (4 mg total) by mouth every 8 (eight) hours as needed for Nausea.    POTASSIUM CHLORIDE SA (K-DUR,KLOR-CON) 20 MEQ TABLET    Take 1 tablet (20 mEq total) by mouth 2 (two) times daily.    PROMETHAZINE-DEXTROMETHORPHAN (PROMETHAZINE-DM) 6.25-15 MG/5 ML SYRP    5 ml as needed    SERTRALINE (ZOLOFT) 100 MG TABLET        SPIRONOLACTONE (ALDACTONE) 100 MG TABLET    Take 1 tablet by mouth every morning.    TEMAZEPAM (RESTORIL) 15 MG CAP    30 mg.    TIZANIDINE (ZANAFLEX) 4 MG TABLET    TAKE 1 TO 2 TABLETS BY MOUTH THREE TIMES DAILY AS NEEDED FOR PAIN    TOBRAMYCIN-DEXAMETHASONE 0.3-0.1% (TOBRADEX) 0.3-0.1 % DRPS    1 drop into affected eye    TRAZODONE (DESYREL) 50 MG TABLET    Take 1 tablet (50 mg total) by mouth every evening.    VALSARTAN (DIOVAN) 160 MG TABLET    Take 1 tablet (160 mg total) by mouth 2 (two) times daily.       Disclaimer: This note was prepared using voice recognition system and is likely to have sound alike errors and is not proof read.  Please call me with any questions.

## 2024-06-07 DIAGNOSIS — M31.7 MICROSCOPIC POLYANGIITIS: ICD-10-CM

## 2024-06-07 RX ORDER — PREDNISONE 5 MG/1
5 TABLET ORAL
Qty: 90 TABLET | Refills: 3 | OUTPATIENT
Start: 2024-06-07

## 2024-06-13 ENCOUNTER — HOSPITAL ENCOUNTER (OUTPATIENT)
Dept: RADIOLOGY | Facility: HOSPITAL | Age: 49
Discharge: HOME | End: 2024-06-13
Attending: INTERNAL MEDICINE
Payer: MEDICARE

## 2024-06-13 ENCOUNTER — INFUSION (OUTPATIENT)
Dept: INFUSION THERAPY | Facility: HOSPITAL | Age: 49
End: 2024-06-13
Attending: INTERNAL MEDICINE
Payer: MEDICARE

## 2024-06-13 VITALS
TEMPERATURE: 97 F | SYSTOLIC BLOOD PRESSURE: 97 MMHG | WEIGHT: 246.5 LBS | HEART RATE: 78 BPM | HEIGHT: 62 IN | OXYGEN SATURATION: 98 % | DIASTOLIC BLOOD PRESSURE: 67 MMHG | BODY MASS INDEX: 45.36 KG/M2 | RESPIRATION RATE: 18 BRPM

## 2024-06-13 DIAGNOSIS — M25.562 ACUTE PAIN OF LEFT KNEE: ICD-10-CM

## 2024-06-13 DIAGNOSIS — M06.09 RHEUMATOID ARTHRITIS OF MULTIPLE SITES WITH NEGATIVE RHEUMATOID FACTOR: Primary | ICD-10-CM

## 2024-06-13 PROCEDURE — 73562 X-RAY EXAM OF KNEE 3: CPT | Mod: TC,50

## 2024-06-13 PROCEDURE — 96365 THER/PROPH/DIAG IV INF INIT: CPT

## 2024-06-13 PROCEDURE — 63600175 PHARM REV CODE 636 W HCPCS: Mod: JZ,JG | Performed by: INTERNAL MEDICINE

## 2024-06-13 PROCEDURE — 25000003 PHARM REV CODE 250: Performed by: INTERNAL MEDICINE

## 2024-06-13 PROCEDURE — 73562 X-RAY EXAM OF KNEE 3: CPT | Mod: 26,50,, | Performed by: STUDENT IN AN ORGANIZED HEALTH CARE EDUCATION/TRAINING PROGRAM

## 2024-06-13 RX ORDER — SODIUM CHLORIDE 0.9 % (FLUSH) 0.9 %
10 SYRINGE (ML) INJECTION
OUTPATIENT
Start: 2024-07-11

## 2024-06-13 RX ORDER — HEPARIN 100 UNIT/ML
500 SYRINGE INTRAVENOUS
OUTPATIENT
Start: 2024-07-11

## 2024-06-13 RX ADMIN — TOCILIZUMAB 800 MG: 20 INJECTION, SOLUTION, CONCENTRATE INTRAVENOUS at 10:06

## 2024-06-13 NOTE — NURSING
Infusion # Actemra - 800 mg q 4wks  Last dose- 5/16/24    Any:  -recent illness, infection, or antibiotic use in past week- denies  -open wounds or mouth sores- denies  -invasive procedures or surgeries in past 4 weeks or in upcoming 4 weeks- denies  -vaccinations in past week- denies  -any new symptoms/change in symptoms-knee pain  -chance you may be pregnant- denies      Recent labs? 6/13/24- Kidney Fx labs elevated, ok to proceed per Dr. LÓPEZ  Last Rheumatology provider visit- Seen by Dr. LÓPEZ on 6/4/24     Premeds-N/A     Actemra 800 mg administered IV at a 60 minute rate per orders; see MAR and vitals for more details. Tolerated well without adverse events, discharged and ambulatory out of clinic.        no

## 2024-06-13 NOTE — PLAN OF CARE
Problem: Adult Inpatient Plan of Care  Goal: Plan of Care Review  6/13/2024 1247 by Naz Patterson RN  Outcome: Progressing  6/13/2024 1246 by Naz Patterson RN  Outcome: Progressing  Goal: Absence of Hospital-Acquired Illness or Injury  6/13/2024 1247 by Naz Patterson RN  Outcome: Progressing  6/13/2024 1246 by Naz Patterson RN  Outcome: Progressing  Goal: Optimal Comfort and Wellbeing  6/13/2024 1247 by Naz Patterson RN  Outcome: Progressing  6/13/2024 1246 by Naz Patterson RN  Outcome: Progressing     Problem: Infection  Goal: Absence of Infection Signs and Symptoms  Outcome: Progressing

## 2024-06-13 NOTE — PLAN OF CARE
Problem: Adult Inpatient Plan of Care  Goal: Plan of Care Review  Outcome: Progressing  Goal: Absence of Hospital-Acquired Illness or Injury  Outcome: Progressing  Goal: Optimal Comfort and Wellbeing  Outcome: Progressing     Problem: Anemia  Goal: Anemia Symptom Improvement  Outcome: Progressing

## 2024-07-11 ENCOUNTER — INFUSION (OUTPATIENT)
Dept: INFUSION THERAPY | Facility: HOSPITAL | Age: 49
End: 2024-07-11
Attending: INTERNAL MEDICINE
Payer: MEDICARE

## 2024-07-11 VITALS
HEIGHT: 62 IN | SYSTOLIC BLOOD PRESSURE: 105 MMHG | HEART RATE: 82 BPM | BODY MASS INDEX: 44.91 KG/M2 | TEMPERATURE: 98 F | DIASTOLIC BLOOD PRESSURE: 72 MMHG | WEIGHT: 244.06 LBS | RESPIRATION RATE: 16 BRPM | OXYGEN SATURATION: 98 %

## 2024-07-11 DIAGNOSIS — M06.09 RHEUMATOID ARTHRITIS OF MULTIPLE SITES WITH NEGATIVE RHEUMATOID FACTOR: Primary | ICD-10-CM

## 2024-07-11 PROCEDURE — 25000003 PHARM REV CODE 250: Performed by: INTERNAL MEDICINE

## 2024-07-11 PROCEDURE — 96365 THER/PROPH/DIAG IV INF INIT: CPT

## 2024-07-11 PROCEDURE — 63600175 PHARM REV CODE 636 W HCPCS: Mod: JZ,JG | Performed by: INTERNAL MEDICINE

## 2024-07-11 RX ORDER — HEPARIN 100 UNIT/ML
500 SYRINGE INTRAVENOUS
OUTPATIENT
Start: 2024-08-08

## 2024-07-11 RX ORDER — SODIUM CHLORIDE 0.9 % (FLUSH) 0.9 %
10 SYRINGE (ML) INJECTION
OUTPATIENT
Start: 2024-08-08

## 2024-07-11 RX ORDER — SODIUM CHLORIDE 0.9 % (FLUSH) 0.9 %
10 SYRINGE (ML) INJECTION
Status: DISCONTINUED | OUTPATIENT
Start: 2024-07-11 | End: 2024-07-11 | Stop reason: HOSPADM

## 2024-07-11 RX ADMIN — TOCILIZUMAB 800 MG: 20 INJECTION, SOLUTION, CONCENTRATE INTRAVENOUS at 08:07

## 2024-07-11 RX ADMIN — SODIUM CHLORIDE: 9 INJECTION, SOLUTION INTRAVENOUS at 08:07

## 2024-07-11 NOTE — PLAN OF CARE
Problem: Adult Inpatient Plan of Care  Goal: Plan of Care Review  Outcome: Progressing  Flowsheets (Taken 7/11/2024 0828)  Plan of Care Reviewed With: patient  Goal: Optimal Comfort and Wellbeing  Outcome: Progressing  Intervention: Provide Person-Centered Care  Flowsheets (Taken 7/11/2024 0828)  Trust Relationship/Rapport:   care explained   questions encouraged   choices provided   reassurance provided   emotional support provided   thoughts/feelings acknowledged   empathic listening provided   questions answered

## 2024-08-08 ENCOUNTER — INFUSION (OUTPATIENT)
Dept: INFUSION THERAPY | Facility: HOSPITAL | Age: 49
End: 2024-08-08
Attending: INTERNAL MEDICINE
Payer: MEDICARE

## 2024-08-08 VITALS
HEIGHT: 62 IN | OXYGEN SATURATION: 97 % | HEART RATE: 80 BPM | RESPIRATION RATE: 18 BRPM | WEIGHT: 249.31 LBS | DIASTOLIC BLOOD PRESSURE: 64 MMHG | SYSTOLIC BLOOD PRESSURE: 109 MMHG | TEMPERATURE: 98 F | BODY MASS INDEX: 45.88 KG/M2

## 2024-08-08 DIAGNOSIS — M06.09 RHEUMATOID ARTHRITIS OF MULTIPLE SITES WITH NEGATIVE RHEUMATOID FACTOR: Primary | ICD-10-CM

## 2024-08-08 PROCEDURE — 25000003 PHARM REV CODE 250: Performed by: INTERNAL MEDICINE

## 2024-08-08 PROCEDURE — 63600175 PHARM REV CODE 636 W HCPCS: Mod: JZ,JG | Performed by: INTERNAL MEDICINE

## 2024-08-08 PROCEDURE — 96365 THER/PROPH/DIAG IV INF INIT: CPT

## 2024-08-08 RX ORDER — SODIUM CHLORIDE 0.9 % (FLUSH) 0.9 %
10 SYRINGE (ML) INJECTION
Status: DISCONTINUED | OUTPATIENT
Start: 2024-08-08 | End: 2024-08-08 | Stop reason: HOSPADM

## 2024-08-08 RX ORDER — HEPARIN 100 UNIT/ML
500 SYRINGE INTRAVENOUS
Status: DISCONTINUED | OUTPATIENT
Start: 2024-08-08 | End: 2024-08-08 | Stop reason: HOSPADM

## 2024-08-08 RX ORDER — HEPARIN 100 UNIT/ML
500 SYRINGE INTRAVENOUS
OUTPATIENT
Start: 2024-09-05

## 2024-08-08 RX ORDER — SODIUM CHLORIDE 0.9 % (FLUSH) 0.9 %
10 SYRINGE (ML) INJECTION
OUTPATIENT
Start: 2024-09-05

## 2024-08-08 RX ADMIN — TOCILIZUMAB 800 MG: 20 INJECTION, SOLUTION, CONCENTRATE INTRAVENOUS at 08:08

## 2024-08-27 ENCOUNTER — PATIENT MESSAGE (OUTPATIENT)
Dept: ADMINISTRATIVE | Facility: OTHER | Age: 49
End: 2024-08-27
Payer: MEDICARE

## 2024-09-04 ENCOUNTER — LAB VISIT (OUTPATIENT)
Dept: LAB | Facility: HOSPITAL | Age: 49
End: 2024-09-04
Attending: INTERNAL MEDICINE
Payer: MEDICARE

## 2024-09-04 DIAGNOSIS — Z51.81 ENCOUNTER FOR MEDICATION MONITORING: ICD-10-CM

## 2024-09-04 DIAGNOSIS — Z79.899 DRUG-INDUCED IMMUNODEFICIENCY: ICD-10-CM

## 2024-09-04 DIAGNOSIS — D84.821 DRUG-INDUCED IMMUNODEFICIENCY: ICD-10-CM

## 2024-09-04 DIAGNOSIS — M06.09 RHEUMATOID ARTHRITIS OF MULTIPLE SITES WITH NEGATIVE RHEUMATOID FACTOR: ICD-10-CM

## 2024-09-04 DIAGNOSIS — M31.7 MICROSCOPIC POLYANGIITIS: ICD-10-CM

## 2024-09-04 LAB
ALBUMIN SERPL BCP-MCNC: 4.2 G/DL (ref 3.5–5.2)
ALP SERPL-CCNC: 57 U/L (ref 55–135)
ALT SERPL W/O P-5'-P-CCNC: 28 U/L (ref 10–44)
ANION GAP SERPL CALC-SCNC: 11 MMOL/L (ref 8–16)
AST SERPL-CCNC: 18 U/L (ref 10–40)
BASOPHILS # BLD AUTO: 0.03 K/UL (ref 0–0.2)
BASOPHILS NFR BLD: 0.5 % (ref 0–1.9)
BILIRUB SERPL-MCNC: 0.5 MG/DL (ref 0.1–1)
BUN SERPL-MCNC: 20 MG/DL (ref 6–20)
CALCIUM SERPL-MCNC: 9.7 MG/DL (ref 8.7–10.5)
CHLORIDE SERPL-SCNC: 106 MMOL/L (ref 95–110)
CO2 SERPL-SCNC: 23 MMOL/L (ref 23–29)
CREAT SERPL-MCNC: 1.3 MG/DL (ref 0.5–1.4)
CRP SERPL-MCNC: 0.1 MG/L (ref 0–8.2)
DIFFERENTIAL METHOD BLD: ABNORMAL
EOSINOPHIL # BLD AUTO: 0.2 K/UL (ref 0–0.5)
EOSINOPHIL NFR BLD: 4.2 % (ref 0–8)
ERYTHROCYTE [DISTWIDTH] IN BLOOD BY AUTOMATED COUNT: 12.5 % (ref 11.5–14.5)
ERYTHROCYTE [SEDIMENTATION RATE] IN BLOOD BY PHOTOMETRIC METHOD: 2 MM/HR (ref 0–36)
EST. GFR  (NO RACE VARIABLE): 50 ML/MIN/1.73 M^2
GLUCOSE SERPL-MCNC: 93 MG/DL (ref 70–110)
HCT VFR BLD AUTO: 36.7 % (ref 37–48.5)
HGB BLD-MCNC: 11.9 G/DL (ref 12–16)
IMM GRANULOCYTES # BLD AUTO: 0.02 K/UL (ref 0–0.04)
IMM GRANULOCYTES NFR BLD AUTO: 0.4 % (ref 0–0.5)
LYMPHOCYTES # BLD AUTO: 2.7 K/UL (ref 1–4.8)
LYMPHOCYTES NFR BLD: 47.5 % (ref 18–48)
MCH RBC QN AUTO: 29.7 PG (ref 27–31)
MCHC RBC AUTO-ENTMCNC: 32.4 G/DL (ref 32–36)
MCV RBC AUTO: 92 FL (ref 82–98)
MONOCYTES # BLD AUTO: 0.8 K/UL (ref 0.3–1)
MONOCYTES NFR BLD: 13.4 % (ref 4–15)
NEUTROPHILS # BLD AUTO: 1.9 K/UL (ref 1.8–7.7)
NEUTROPHILS NFR BLD: 34 % (ref 38–73)
NRBC BLD-RTO: 0 /100 WBC
PLATELET # BLD AUTO: 217 K/UL (ref 150–450)
PMV BLD AUTO: 10.9 FL (ref 9.2–12.9)
POTASSIUM SERPL-SCNC: 4.1 MMOL/L (ref 3.5–5.1)
PROT SERPL-MCNC: 7 G/DL (ref 6–8.4)
RBC # BLD AUTO: 4.01 M/UL (ref 4–5.4)
SODIUM SERPL-SCNC: 140 MMOL/L (ref 136–145)
WBC # BLD AUTO: 5.68 K/UL (ref 3.9–12.7)

## 2024-09-04 PROCEDURE — 85025 COMPLETE CBC W/AUTO DIFF WBC: CPT | Performed by: INTERNAL MEDICINE

## 2024-09-04 PROCEDURE — 86140 C-REACTIVE PROTEIN: CPT | Performed by: INTERNAL MEDICINE

## 2024-09-04 PROCEDURE — 85652 RBC SED RATE AUTOMATED: CPT | Performed by: INTERNAL MEDICINE

## 2024-09-04 PROCEDURE — 36415 COLL VENOUS BLD VENIPUNCTURE: CPT | Performed by: INTERNAL MEDICINE

## 2024-09-04 PROCEDURE — 80053 COMPREHEN METABOLIC PANEL: CPT | Performed by: INTERNAL MEDICINE

## 2024-09-05 ENCOUNTER — INFUSION (OUTPATIENT)
Dept: INFUSION THERAPY | Facility: HOSPITAL | Age: 49
End: 2024-09-05
Attending: INTERNAL MEDICINE
Payer: MEDICARE

## 2024-09-05 VITALS
RESPIRATION RATE: 18 BRPM | BODY MASS INDEX: 44.99 KG/M2 | OXYGEN SATURATION: 99 % | HEIGHT: 62 IN | WEIGHT: 244.5 LBS | SYSTOLIC BLOOD PRESSURE: 141 MMHG | TEMPERATURE: 97 F | HEART RATE: 76 BPM | DIASTOLIC BLOOD PRESSURE: 86 MMHG

## 2024-09-05 DIAGNOSIS — M06.09 RHEUMATOID ARTHRITIS OF MULTIPLE SITES WITH NEGATIVE RHEUMATOID FACTOR: Primary | ICD-10-CM

## 2024-09-05 PROCEDURE — 96365 THER/PROPH/DIAG IV INF INIT: CPT

## 2024-09-05 PROCEDURE — 25000003 PHARM REV CODE 250: Performed by: INTERNAL MEDICINE

## 2024-09-05 PROCEDURE — 63600175 PHARM REV CODE 636 W HCPCS: Mod: JZ,JG | Performed by: INTERNAL MEDICINE

## 2024-09-05 RX ORDER — SODIUM CHLORIDE 0.9 % (FLUSH) 0.9 %
10 SYRINGE (ML) INJECTION
Status: DISCONTINUED | OUTPATIENT
Start: 2024-09-05 | End: 2024-09-05 | Stop reason: HOSPADM

## 2024-09-05 RX ORDER — HEPARIN 100 UNIT/ML
500 SYRINGE INTRAVENOUS
Status: DISCONTINUED | OUTPATIENT
Start: 2024-09-05 | End: 2024-09-05 | Stop reason: HOSPADM

## 2024-09-05 RX ORDER — SODIUM CHLORIDE 0.9 % (FLUSH) 0.9 %
10 SYRINGE (ML) INJECTION
OUTPATIENT
Start: 2024-10-03

## 2024-09-05 RX ORDER — HEPARIN 100 UNIT/ML
500 SYRINGE INTRAVENOUS
OUTPATIENT
Start: 2024-10-03

## 2024-09-05 RX ADMIN — TOCILIZUMAB 800 MG: 20 INJECTION, SOLUTION, CONCENTRATE INTRAVENOUS at 08:09

## 2024-09-26 DIAGNOSIS — M06.09 RHEUMATOID ARTHRITIS OF MULTIPLE SITES WITH NEGATIVE RHEUMATOID FACTOR: ICD-10-CM

## 2024-09-26 DIAGNOSIS — M31.7 MICROSCOPIC POLYANGIITIS: ICD-10-CM

## 2024-09-26 RX ORDER — MYCOPHENOLATE MOFETIL 500 MG/1
1500 TABLET ORAL 2 TIMES DAILY
Qty: 180 TABLET | Refills: 3 | Status: ACTIVE | OUTPATIENT
Start: 2024-09-26

## 2024-10-01 ENCOUNTER — TELEPHONE (OUTPATIENT)
Dept: RHEUMATOLOGY | Facility: CLINIC | Age: 49
End: 2024-10-01
Payer: MEDICARE

## 2024-10-01 NOTE — TELEPHONE ENCOUNTER
----- Message from Jesus Odell sent at 10/1/2024 11:01 AM CDT -----  Pt needs call back to r/s appt 7 days after being off augmentin per , will start taking tomorrow...[phone] (home)

## 2024-10-10 ENCOUNTER — PATIENT MESSAGE (OUTPATIENT)
Dept: RHEUMATOLOGY | Facility: CLINIC | Age: 49
End: 2024-10-10

## 2024-10-10 DIAGNOSIS — M06.09 RHEUMATOID ARTHRITIS OF MULTIPLE SITES WITH NEGATIVE RHEUMATOID FACTOR: Primary | ICD-10-CM

## 2024-10-11 DIAGNOSIS — M79.7 FIBROMYALGIA: ICD-10-CM

## 2024-10-11 RX ORDER — CYCLOBENZAPRINE HCL 10 MG
10 TABLET ORAL 3 TIMES DAILY PRN
Qty: 90 TABLET | Refills: 11 | Status: SHIPPED | OUTPATIENT
Start: 2024-10-11

## 2024-10-17 RX ORDER — TOCILIZUMAB 180 MG/ML
162 INJECTION, SOLUTION SUBCUTANEOUS
Qty: 3.6 ML | Refills: 11 | Status: ACTIVE | OUTPATIENT
Start: 2024-10-17

## 2024-10-17 NOTE — TELEPHONE ENCOUNTER
Pt provided with Medicare Pt D Extra Help link via [employer].   Explained to her that Actemra Rx would be sent to [facility], they would obtain PA for Actemra through her Medicare pt D. If copay is too high, they would assist with financial assistance through the .   Pt will be going to Medicaid office in  on Monday.   Patient to present to [facility] Monday for new treatment discussion/vouchers.   Patient voiced understanding and had no further questions at this time.

## 2024-10-21 ENCOUNTER — CLINICAL SUPPORT (OUTPATIENT)
Dept: RHEUMATOLOGY | Facility: CLINIC | Age: 49
End: 2024-10-21
Payer: MEDICARE

## 2024-10-21 DIAGNOSIS — M06.09 RHEUMATOID ARTHRITIS OF MULTIPLE SITES WITH NEGATIVE RHEUMATOID FACTOR: Primary | ICD-10-CM

## 2024-10-21 PROCEDURE — 99999 PR PBB SHADOW E&M-EST. PATIENT-LVL I: CPT | Mod: PBBFAC,,, | Performed by: PHARMACIST

## 2024-10-21 NOTE — PROGRESS NOTES
Clinical Pharmacy Progress Note: Medication Education     Patient (accompanied by mother) was counseled by pharmacist on new medication Actemra SQ and its indications, side effects, and reasons for taking this medication.   - Educated patient on mechanism of action, frequency and route of administration, onset of action, and safety profile of Actemra SQ.   - Encouraged pt to practice proper hand hygiene considering increased risk of infection with biologics. Pt to make us aware if she ever experiences s/sx of an infection including fever, chills, URI symptoms or UTI symptoms.   - Labs up to date: CBC/CMP ; TB; Hep B, continuation of therapy (patient previously on IV therapy.     Patient was provided educational drug card/handout/package insert.   Patient expressed understanding and had no further questions.      Thank you for allowing us to participate in this patient's care.    Juanita Ch, PharmD  Ambulatory Clinical Pharmacist- Rheumatology

## 2024-11-19 ENCOUNTER — PATIENT MESSAGE (OUTPATIENT)
Dept: RHEUMATOLOGY | Facility: CLINIC | Age: 49
End: 2024-11-19
Payer: MEDICARE

## 2024-11-19 DIAGNOSIS — M06.09 RHEUMATOID ARTHRITIS OF MULTIPLE SITES WITH NEGATIVE RHEUMATOID FACTOR: Primary | ICD-10-CM

## 2024-11-19 RX ORDER — PREDNISONE 5 MG/1
5 TABLET ORAL DAILY
Qty: 90 TABLET | Refills: 0 | Status: SHIPPED | OUTPATIENT
Start: 2024-11-19

## 2024-11-19 NOTE — TELEPHONE ENCOUNTER
Spoke to patient.  She had a 2 month lapse due to her insurance so she did not get her Actemra infusion in October or November.  She is now on her 3rd SQ Actemra and is c/o a lot of generalized pain.  She is still taking Neurontin, Flexeril and  Cymbalta     Please advise

## 2024-12-27 ENCOUNTER — PATIENT MESSAGE (OUTPATIENT)
Dept: RHEUMATOLOGY | Facility: CLINIC | Age: 49
End: 2024-12-27
Payer: MEDICARE

## 2025-01-17 DIAGNOSIS — M31.7 MICROSCOPIC POLYANGIITIS: ICD-10-CM

## 2025-01-17 DIAGNOSIS — M06.09 RHEUMATOID ARTHRITIS OF MULTIPLE SITES WITH NEGATIVE RHEUMATOID FACTOR: ICD-10-CM

## 2025-01-20 RX ORDER — MYCOPHENOLATE MOFETIL 500 MG/1
1500 TABLET ORAL 2 TIMES DAILY
Qty: 180 TABLET | Refills: 3 | Status: ACTIVE | OUTPATIENT
Start: 2025-01-20

## 2025-03-25 ENCOUNTER — TELEPHONE (OUTPATIENT)
Dept: RHEUMATOLOGY | Facility: CLINIC | Age: 50
End: 2025-03-25
Payer: MEDICARE

## 2025-03-25 NOTE — TELEPHONE ENCOUNTER
----- Message from Erika sent at 3/25/2025  3:30 PM CDT -----  Contact: Denise  .Patient is calling to speak with the nurse regarding concerns  . Reports needing to speak with someone pt is in pain  . Please give patient a call back at .[phone] (home)

## 2025-03-26 ENCOUNTER — RESULTS FOLLOW-UP (OUTPATIENT)
Dept: RHEUMATOLOGY | Facility: CLINIC | Age: 50
End: 2025-03-26

## 2025-03-26 ENCOUNTER — TELEPHONE (OUTPATIENT)
Dept: RHEUMATOLOGY | Facility: CLINIC | Age: 50
End: 2025-03-26
Payer: MEDICARE

## 2025-03-26 ENCOUNTER — PATIENT MESSAGE (OUTPATIENT)
Dept: RHEUMATOLOGY | Facility: CLINIC | Age: 50
End: 2025-03-26

## 2025-03-26 ENCOUNTER — OFFICE VISIT (OUTPATIENT)
Dept: RHEUMATOLOGY | Facility: CLINIC | Age: 50
End: 2025-03-26
Payer: MEDICARE

## 2025-03-26 ENCOUNTER — HOSPITAL ENCOUNTER (OUTPATIENT)
Dept: RADIOLOGY | Facility: HOSPITAL | Age: 50
Discharge: HOME | End: 2025-03-26
Attending: INTERNAL MEDICINE
Payer: MEDICARE

## 2025-03-26 ENCOUNTER — HOSPITAL ENCOUNTER (OUTPATIENT)
Dept: CARDIOLOGY | Facility: HOSPITAL | Age: 50
Discharge: HOME | End: 2025-03-26
Attending: INTERNAL MEDICINE
Payer: MEDICARE

## 2025-03-26 VITALS
WEIGHT: 244 LBS | BODY MASS INDEX: 44.9 KG/M2 | DIASTOLIC BLOOD PRESSURE: 107 MMHG | HEIGHT: 62 IN | SYSTOLIC BLOOD PRESSURE: 173 MMHG

## 2025-03-26 DIAGNOSIS — Z79.899 DRUG-INDUCED IMMUNODEFICIENCY: ICD-10-CM

## 2025-03-26 DIAGNOSIS — D68.61 ANTIPHOSPHOLIPID ANTIBODY WITH HYPERCOAGULABLE STATE: ICD-10-CM

## 2025-03-26 DIAGNOSIS — Z51.81 ENCOUNTER FOR MEDICATION MONITORING: ICD-10-CM

## 2025-03-26 DIAGNOSIS — D84.821 DRUG-INDUCED IMMUNODEFICIENCY: ICD-10-CM

## 2025-03-26 DIAGNOSIS — M31.7 MICROSCOPIC POLYANGIITIS: ICD-10-CM

## 2025-03-26 DIAGNOSIS — M31.7 MICROSCOPIC POLYANGIITIS: Primary | ICD-10-CM

## 2025-03-26 DIAGNOSIS — M06.09 RHEUMATOID ARTHRITIS OF MULTIPLE SITES WITH NEGATIVE RHEUMATOID FACTOR: ICD-10-CM

## 2025-03-26 DIAGNOSIS — E66.813 CLASS 3 SEVERE OBESITY DUE TO EXCESS CALORIES WITH SERIOUS COMORBIDITY AND BODY MASS INDEX (BMI) OF 40.0 TO 44.9 IN ADULT: ICD-10-CM

## 2025-03-26 DIAGNOSIS — E66.01 CLASS 3 SEVERE OBESITY DUE TO EXCESS CALORIES WITH SERIOUS COMORBIDITY AND BODY MASS INDEX (BMI) OF 40.0 TO 44.9 IN ADULT: ICD-10-CM

## 2025-03-26 LAB
AORTIC ROOT ANNULUS: 3.39 CM
AV INDEX (PROSTH): 0.87
AV MEAN GRADIENT: 5 MMHG
AV PEAK GRADIENT: 9 MMHG
AV REGURGITATION PRESSURE HALF TIME: 637 MS
AV VALVE AREA BY VELOCITY RATIO: 2.5 CM²
AV VALVE AREA: 2.7 CM²
AV VELOCITY RATIO: 0.8
BSA FOR ECHO PROCEDURE: 2.2 M2
CV ECHO LV RWT: 0.5 CM
DOP CALC AO PEAK VEL: 1.5 M/S
DOP CALC AO VTI: 23.1 CM
DOP CALC LVOT AREA: 3.1 CM2
DOP CALC LVOT DIAMETER: 2 CM
DOP CALC LVOT PEAK VEL: 1.2 M/S
DOP CALC LVOT STROKE VOLUME: 63.4 CM3
DOP CALC RVOT PEAK VEL: 0.62 M/S
DOP CALC RVOT VTI: 12.1 CM
DOP CALCLVOT PEAK VEL VTI: 20.2 CM
E WAVE DECELERATION TIME: 301 MSEC
E/A RATIO: 0.96
E/E' RATIO: 10 M/S
ECHO LV POSTERIOR WALL: 1.2 CM (ref 0.6–1.1)
EJECTION FRACTION: 60 %
FRACTIONAL SHORTENING: 31.3 % (ref 28–44)
INTERVENTRICULAR SEPTUM: 1 CM (ref 0.6–1.1)
IVRT: 77 MSEC
LA MAJOR: 3.8 CM
LA WIDTH: 3.2 CM
LEFT ATRIUM SIZE: 3.7 CM
LEFT INTERNAL DIMENSION IN SYSTOLE: 3.3 CM (ref 2.1–4)
LEFT VENTRICLE DIASTOLIC VOLUME INDEX: 52.88 ML/M2
LEFT VENTRICLE DIASTOLIC VOLUME: 110 ML
LEFT VENTRICLE MASS INDEX: 93.3 G/M2
LEFT VENTRICLE SYSTOLIC VOLUME INDEX: 20.7 ML/M2
LEFT VENTRICLE SYSTOLIC VOLUME: 43 ML
LEFT VENTRICULAR INTERNAL DIMENSION IN DIASTOLE: 4.8 CM (ref 3.5–6)
LEFT VENTRICULAR MASS: 194 G
LV LATERAL E/E' RATIO: 11 M/S
LV SEPTAL E/E' RATIO: 9.6 M/S
LVED V (TEICH): 109.76 ML
LVES V (TEICH): 42.91 ML
LVOT MG: 3.13 MMHG
LVOT MV: 0.84 CM/S
MV PEAK A VEL: 0.8 M/S
MV PEAK E VEL: 0.77 M/S
PISA AR MAX VEL: 2.92 M/S
PISA TR MAX VEL: 2.4 M/S
PV MEAN GRADIENT: 1 MMHG
RA MAJOR: 3.62 CM
RA PRESSURE ESTIMATED: 3 MMHG
RA WIDTH: 2.67 CM
RV TB RVSP: 5 MMHG
SINUS: 3.04 CM
STJ: 3.29 CM
TDI LATERAL: 0.07 M/S
TDI SEPTAL: 0.08 M/S
TDI: 0.08 M/S
TR MAX PG: 22 MMHG
TV REST PULMONARY ARTERY PRESSURE: 26 MMHG
Z-SCORE OF LEFT VENTRICULAR DIMENSION IN END DIASTOLE: -2.83
Z-SCORE OF LEFT VENTRICULAR DIMENSION IN END SYSTOLE: -1.31

## 2025-03-26 PROCEDURE — 71046 X-RAY EXAM CHEST 2 VIEWS: CPT | Mod: 26,,, | Performed by: STUDENT IN AN ORGANIZED HEALTH CARE EDUCATION/TRAINING PROGRAM

## 2025-03-26 PROCEDURE — 93306 TTE W/DOPPLER COMPLETE: CPT

## 2025-03-26 PROCEDURE — 71046 X-RAY EXAM CHEST 2 VIEWS: CPT | Mod: TC

## 2025-03-26 PROCEDURE — 93306 TTE W/DOPPLER COMPLETE: CPT | Mod: 26,,, | Performed by: INTERNAL MEDICINE

## 2025-03-26 RX ORDER — EPINEPHRINE 0.3 MG/.3ML
0.3 INJECTION SUBCUTANEOUS ONCE AS NEEDED
OUTPATIENT
Start: 2025-03-26

## 2025-03-26 RX ORDER — HEPARIN 100 UNIT/ML
500 SYRINGE INTRAVENOUS
OUTPATIENT
Start: 2025-03-26

## 2025-03-26 RX ORDER — SODIUM CHLORIDE 0.9 % (FLUSH) 0.9 %
10 SYRINGE (ML) INJECTION
OUTPATIENT
Start: 2025-03-26

## 2025-03-26 RX ORDER — METHYLPREDNISOLONE SOD SUCC 125 MG
125 VIAL (EA) INJECTION
OUTPATIENT
Start: 2025-03-26

## 2025-03-26 RX ORDER — ACETAMINOPHEN 325 MG/1
650 TABLET ORAL
OUTPATIENT
Start: 2025-03-26

## 2025-03-26 RX ORDER — MEPERIDINE HYDROCHLORIDE 50 MG/ML
25 INJECTION INTRAMUSCULAR; INTRAVENOUS; SUBCUTANEOUS
OUTPATIENT
Start: 2025-03-26

## 2025-03-26 RX ORDER — DIPHENHYDRAMINE HYDROCHLORIDE 50 MG/ML
50 INJECTION, SOLUTION INTRAMUSCULAR; INTRAVENOUS ONCE AS NEEDED
OUTPATIENT
Start: 2025-03-26

## 2025-03-26 RX ORDER — PREDNISONE 20 MG/1
TABLET ORAL
Qty: 55 TABLET | Refills: 0 | Status: SHIPPED | OUTPATIENT
Start: 2025-03-26 | End: 2025-05-09

## 2025-03-26 RX ORDER — FAMOTIDINE 10 MG/ML
20 INJECTION, SOLUTION INTRAVENOUS
OUTPATIENT
Start: 2025-03-26

## 2025-03-26 NOTE — Clinical Note
Please see how she is doing today    Severe vasculitis flare. Increasing prednisone to 40 mg. Labs, cxr and ECHO stat. Rituxan vasculitis protocol weekly for 4 doses. Follow up one week after completion for further treatment decision.

## 2025-03-26 NOTE — TELEPHONE ENCOUNTER
----- Message from Ruperto Noguera MD sent at 3/26/2025  9:30 AM CDT -----  Severe vasculitis flare. Increasing prednisone to 40 mg. Labs, cxr and ECHO stat. Rituxan vasculitis protocol weekly for 4 doses. Follow up one week after completion for further treatment decision.

## 2025-03-26 NOTE — PROGRESS NOTES
RHEUMATOLOGY FOLLOW UP - TELE VISIT     The patient location is: [place]  The chief complaint leading to consultation is:  Worsening pain  Visit type: Virtual visit with synchronous audio and video  Total time spent with patient:  15 minutes  Each patient to whom he or she provides medical services by telemedicine is:  (1) informed of the relationship between the physician and patient and the respective role of any other health care provider with respect to management of the patient; and (2) notified that he or she may decline to receive medical services by telemedicine and may withdraw from such care at any time.    Chief complaints, HPI, ROS, EXAM, Assessment & Plans:-  Denise JoWillow Springselaine a 49 y.o. pleasant female seen today for follow-up visit. Microscopic polyangiitis  with joint and renal involvement on Actemra  and CellCept  after completing Cytoxan induction therapy.  Complains of worsening diffuse pain and joint pain .  On prednisone 20 mg daily with no significant improvement.    Rheumatological review of system negative otherwise.   100% fist formation bilateral hands.    1. Microscopic polyangiitis    2. Drug-induced immunodeficiency    3. Encounter for medication monitoring    4. Antiphospholipid antibody with hypercoagulable state      Problem List Items Addressed This Visit       Antiphospholipid antibody with hypercoagulable state    Relevant Orders    CBC Auto Differential    Comprehensive Metabolic Panel    C-Reactive Protein    Sedimentation rate    Proteinase 3 Autoantibodies    Myeloperoxidase Antibody (MPO)    Anti-Neutrophilic Cytoplasmic Antibody    ED Screen w/Reflex    X-Ray Chest PA And Lateral (Completed)    Echo (Completed)    Hepatitis B Core Antibody, Total    Hepatitis B Surface Ab, Qualitative    Hepatitis B Surface Antigen    Hepatitis C Antibody    Quantiferon Gold TB    Drug-induced immunodeficiency    Relevant Orders    CBC Auto Differential    Comprehensive Metabolic Panel     C-Reactive Protein    Sedimentation rate    Proteinase 3 Autoantibodies    Myeloperoxidase Antibody (MPO)    Anti-Neutrophilic Cytoplasmic Antibody    ED Screen w/Reflex    X-Ray Chest PA And Lateral (Completed)    Echo (Completed)    Hepatitis B Core Antibody, Total    Hepatitis B Surface Ab, Qualitative    Hepatitis B Surface Antigen    Hepatitis C Antibody    Quantiferon Gold TB    Encounter for medication monitoring    Relevant Orders    CBC Auto Differential    Comprehensive Metabolic Panel    C-Reactive Protein    Sedimentation rate    Proteinase 3 Autoantibodies    Myeloperoxidase Antibody (MPO)    Anti-Neutrophilic Cytoplasmic Antibody    ED Screen w/Reflex    X-Ray Chest PA And Lateral (Completed)    Echo (Completed)    Hepatitis B Core Antibody, Total    Hepatitis B Surface Ab, Qualitative    Hepatitis B Surface Antigen    Hepatitis C Antibody    Quantiferon Gold TB    Microscopic polyangiitis - Primary    Relevant Medications    predniSONE (DELTASONE) 20 MG tablet    Other Relevant Orders    CBC Auto Differential    Comprehensive Metabolic Panel    C-Reactive Protein    Sedimentation rate    Proteinase 3 Autoantibodies    Myeloperoxidase Antibody (MPO)    Anti-Neutrophilic Cytoplasmic Antibody    ED Screen w/Reflex    X-Ray Chest PA And Lateral (Completed)    Echo (Completed)    Hepatitis B Core Antibody, Total    Hepatitis B Surface Ab, Qualitative    Hepatitis B Surface Antigen    Hepatitis C Antibody    Quantiferon Gold TB      Latest Reference Range & Units Most Recent   ED Screen Negative <1:80  Positive !  10/12/22 13:38   ED Titer 1  1:640  10/12/22 13:38   ED PATTERN 1  Homogeneous  10/12/22 13:38   ds DNA Ab Negative 1:10  Negative 1:10  10/12/22 13:38   Anti-SSA Antibody 0.00 - 0.99 Ratio 0.07  10/12/22 13:38   Anti-SSA Interpretation Negative  Negative  10/12/22 13:38   Anti-SSB Antibody 0.00 - 0.99 Ratio 0.06  10/12/22 13:38   Anti-SSB Interpretation Negative  Negative  10/12/22 13:38    Anti Sm Antibody 0.00 - 0.99 Ratio 0.09  10/12/22 13:38   Anti-Sm Interpretation Negative  Negative  10/12/22 13:38   Anti Sm/RNP Antibody 0.00 - 0.99 Ratio 0.11  10/12/22 13:38   Anti-Sm/RNP Interpretation Negative  Negative  10/12/22 13:38   6- - 450 pmol/8x10(8)RBC 73 (L)  12/15/21 10:29   ANCA Proteinase 3 <0.4 (Negative) U <0.2  10/12/22 13:38   Cytoplasmic Neutrophilic Ab <1:20 Titer See Below !  10/12/22 13:38   MPO <=20 UNITS 22 (H)  10/12/22 13:38   Perinuclear (P-ANCA) <1:20 Titer See Below !  10/12/22 13:38   CCP Antibodies <5.0 U/mL <0.5  10/12/22 13:38   Complement (C-3) 50 - 180 mg/dL 139  5/26/21 17:23   Complement (C-4) 11 - 44 mg/dL 25  5/26/21 17:23   IgG 650 - 1600 mg/dL 1096  12/15/21 10:29   IgM 50 - 300 mg/dL 78  12/15/21 10:29   IgA 40 - 350 mg/dL 387 (H)  12/15/21 10:29   Protein, Serum 6.0 - 8.4 g/dL 7.1  12/15/21 10:29   Albumin grams/dl 3.35 - 5.55 g/dL 3.76  12/15/21 10:29   Alpha-1 grams/dl 0.17 - 0.41 g/dL 0.37  12/15/21 10:29   Alpha-2 0.43 - 0.99 g/dL 0.88  12/15/21 10:29   Beta 0.50 - 1.10 g/dL 0.99  12/15/21 10:29   Gamma 0.67 - 1.58 g/dL 1.10  12/15/21 10:29   Pathologist Interpretation SPE  REVIEWED  12/15/21 10:29   Pathologist Interpretation SIDDHARTH  REVIEWED  12/15/21 10:29   Immunofix Interp.  SEE COMMENT  12/15/21 10:29   Interleukin 6 <=1.8 pg/mL <1.0  10/12/22 13:38   Rheumatoid Factor 0.0 - 15.0 IU/mL <13.0  10/12/22 13:38   !: Data is abnormal  (L): Data is abnormally low  (H): Data is abnormally high     Latest Reference Range & Units 05/16/24 09:19 05/16/24 10:18 05/16/24 10:19   WBC 3.90 - 12.70 K/uL  4.09    RBC 4.00 - 5.40 M/uL  4.09    Hemoglobin 12.0 - 16.0 g/dL  12.4    Hematocrit 37.0 - 48.5 %  37.4    MCV 82 - 98 fL  91    MCH 27.0 - 31.0 pg  30.3    MCHC 32.0 - 36.0 g/dL  33.2    RDW 11.5 - 14.5 %  12.9    Platelet Count 150 - 450 K/uL  222    MPV 9.2 - 12.9 fL  11.7    Gran % 38.0 - 73.0 %  35.9 (L)    Lymph % 18.0 - 48.0 %  42.1    Mono % 4.0 - 15.0 %   17.6 (H)    Eos % 0.0 - 8.0 %  3.7    Basophil % 0.0 - 1.9 %  0.7    Immature Granulocytes 0.0 - 0.5 %  0.0    Gran # (ANC) 1.8 - 7.7 K/uL  1.5 (L)    Lymph # 1.0 - 4.8 K/uL  1.7    Mono # 0.3 - 1.0 K/uL  0.7    Eos # 0.0 - 0.5 K/uL  0.2    Baso # 0.00 - 0.20 K/uL  0.03    Immature Grans (Abs) 0.00 - 0.04 K/uL  0.00    nRBC 0 /100 WBC  0    Differential Method   Automated    Sodium 136 - 145 mmol/L  139    Potassium 3.5 - 5.1 mmol/L  3.5    Chloride 95 - 110 mmol/L  105    CO2 23 - 29 mmol/L  26    Anion Gap 8 - 16 mmol/L  8    BUN 6 - 20 mg/dL  16    Creatinine 0.5 - 1.4 mg/dL  1.2    eGFR >60 mL/min/1.73 m^2  55 !    Glucose 70 - 110 mg/dL  87    Calcium 8.7 - 10.5 mg/dL  9.3    ALP 55 - 135 U/L  59    PROTEIN TOTAL 6.0 - 8.4 g/dL  7.1    Albumin 3.5 - 5.2 g/dL  4.1    BILIRUBIN TOTAL 0.1 - 1.0 mg/dL  0.5    AST 10 - 40 U/L  18    ALT 10 - 44 U/L  29    Proteinase-3 Ab <0.4 (Negative) U   <0.2   MPO <3.5 U/mL   7.7 (H)   Specimen UA  Urine, Clean Catch     Color, UA Yellow, Straw, Carolann  Yellow     Appearance, UA Clear  Clear     Spec Grav UA 1.005 - 1.030  1.015     pH, UA 5.0 - 8.0  6.0     Protein, UA Negative  Negative     Glucose, UA Negative  Negative     Ketones, UA Negative  Negative     Blood, UA Negative  Negative     NITRITE UA Negative  Negative     Bilirubin (UA) Negative  Negative     Leukocyte Esterase, UA Negative  1+ !     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  Discontinue CellCept and Actemra and restart rituximab vasculitis dosing 375 mg per m2 infusion weekly for 4  doses.  Might consider restarting CellCept after completion of Rituxan.    Check chest x-ray and echo.  Start high-dose prednisone taper.  Drug induced immunodeficiency due to use of immunosuppressive drugs. Monitor carefully for infections. Advised to get immediate medical care if any infection. Also advised strict adherence to age appropriate vaccinations and cancer screenings with PCP.  Hold Rituxan if any infection.  I have explained all of the above in detail and the patient understands all of the current recommendation(s). I have answered all questions to the best of my ability and to their complete satisfaction.   # Follow up in about 5 weeks (around 2025).      Past Medical History:   Diagnosis Date    Antiphospholipid antibody with hypercoagulable state 11/10/2022    Hypertension     Microscopic polyangiitis 2020    Stroke            Past Surgical History:   Procedure Laterality Date     SECTION      TUBAL LIGATION          Social History     Tobacco Use    Smoking status: Never    Smokeless tobacco: Never   Substance Use Topics    Alcohol use: Not Currently       Family History   Problem Relation Name Age of Onset    Hypertension Mother      Cancer Father          esophegal       Review of patient's allergies indicates:   Allergen Reactions    Oranges [orange] Hives       Medication List with Changes/Refills   New Medications    PREDNISONE (DELTASONE) 20 MG TABLET    Take 2 tablets (40 mg total) by mouth once daily for 7 days, THEN 1.5 tablets (30 mg total) once daily for 7 days, THEN 1 tablet (20 mg total) once daily.   Current Medications    ALBUTEROL (PROVENTIL/VENTOLIN HFA) 90 MCG/ACTUATION INHALER    2 puffs as needed.    ALPRAZOLAM (XANAX) 0.25 MG TABLET    Take 0.25 mg by mouth Daily.    ASPIRIN 325 MG TABLET        ATORVASTATIN (LIPITOR) 10 MG TABLET        CHOLECALCIFEROL, VITAMIN D3, (VITAMIN D3) 25 MCG (1,000 UNIT) CAPSULE    Take 1,000 Units by mouth once daily.     CLONAZEPAM (KLONOPIN) 1 MG TABLET    TAKE 1 TABLET BY MOUTH ONCE DAILY AT BEDTIME AS NEEDED FOR ANXIETY SLEEP    CLONIDINE (CATAPRES) 0.2 MG TABLET    Take 0.2 mg by mouth 3 (three) times daily.    CYCLOBENZAPRINE (FLEXERIL) 10 MG TABLET    Take 1 tablet (10 mg total) by mouth 3 (three) times daily as needed for Muscle spasms.    DULOXETINE (CYMBALTA) 20 MG CAPSULE    Take 20 mg by mouth once daily.    FEROSUL 325 MG (65 MG IRON) TAB TABLET    TAKE 2 TABLETS BY MOUTH DAILY FOR IRON DEFICIENCY    GABAPENTIN (NEURONTIN) 600 MG TABLET    Take 1 tablet (600 mg total) by mouth 3 (three) times daily.    IPRATROPIUM (ATROVENT) 42 MCG (0.06 %) NASAL SPRAY    SPRAY 2 SPRAYS IN EACH NOSTRIL IN THE MORNING, 2 SPRAYS AT NOON, 2 SPRAYS IN THE EVENING, AND 2 SPRAYS BEFORE BEDTIME    MYCOPHENOLATE (CELLCEPT) 500 MG TAB    Take 3 tablets (1,500 mg total) by mouth 2 (two) times daily.    ONDANSETRON (ZOFRAN) 4 MG TABLET    Take 1 tablet (4 mg total) by mouth every 8 (eight) hours as needed for Nausea.    POTASSIUM CHLORIDE SA (K-DUR,KLOR-CON) 20 MEQ TABLET    Take 1 tablet (20 mEq total) by mouth 2 (two) times daily.    PREDNISONE (DELTASONE) 5 MG TABLET    Take 1 tablet (5 mg total) by mouth once daily.    SERTRALINE (ZOLOFT) 100 MG TABLET        SPIRONOLACTONE (ALDACTONE) 100 MG TABLET    Take 1 tablet by mouth every morning.    TEMAZEPAM (RESTORIL) 15 MG CAP    30 mg.    TOCILIZUMAB (ACTEMRA ACTPEN) 162 MG/0.9 ML PNIJ    Inject 1 pen (162 mg) into the skin every 7 days.    TRAZODONE (DESYREL) 50 MG TABLET    Take 1 tablet (50 mg total) by mouth every evening.    VALSARTAN (DIOVAN) 160 MG TABLET    Take 1 tablet (160 mg total) by mouth 2 (two) times daily.       Disclaimer: This note was prepared using voice recognition system and is likely to have sound alike errors and is not proof read.  Please call me with any questions.

## 2025-03-26 NOTE — TELEPHONE ENCOUNTER
New orders noted. Pt called to discuss infusion protocol and scheduled first available pending insurance approval. Verbalized understanding.

## 2025-03-27 ENCOUNTER — TELEPHONE (OUTPATIENT)
Dept: RHEUMATOLOGY | Facility: CLINIC | Age: 50
End: 2025-03-27
Payer: MEDICARE

## 2025-03-31 NOTE — TELEPHONE ENCOUNTER
Pt has completed labs and has insurance approval for Ruxience. First available appt scheduled for 4/15, 4/22, 4/29, & 5/06.

## 2025-04-15 ENCOUNTER — TELEPHONE (OUTPATIENT)
Dept: INFUSION THERAPY | Facility: HOSPITAL | Age: 50
End: 2025-04-15
Payer: MEDICARE

## 2025-04-16 ENCOUNTER — TELEPHONE (OUTPATIENT)
Dept: RHEUMATOLOGY | Facility: CLINIC | Age: 50
End: 2025-04-16
Payer: MEDICARE

## 2025-04-16 NOTE — TELEPHONE ENCOUNTER
----- Message from Ruperto Noguear MD sent at 4/16/2025 11:29 AM CDT -----   No rescheduling needed. Thanks.  ----- Message -----  From: Maribel Nuñez MA  Sent: 4/16/2025   9:03 AM CDT  To: Ruperto Noguera MD      ----- Message -----  From: Juanita Ch, PharmD  Sent: 4/16/2025   9:02 AM CDT  To: Poornima Hickey Staff    Patient's RTX infusion was delayed by 1 week. Plan was for her to f/u with Dr. LÓPEZ one week post-treatment. Rescheduled infusions would have her appt with him 2 days after last infusion.Would you be able to check w/ Dr. LÓPEZ whether rescheduling is necessary?

## 2025-04-22 ENCOUNTER — INFUSION (OUTPATIENT)
Dept: INFUSION THERAPY | Facility: HOSPITAL | Age: 50
End: 2025-04-22
Attending: INTERNAL MEDICINE
Payer: MEDICARE

## 2025-04-22 VITALS
WEIGHT: 258.81 LBS | OXYGEN SATURATION: 97 % | HEIGHT: 62 IN | BODY MASS INDEX: 47.62 KG/M2 | SYSTOLIC BLOOD PRESSURE: 117 MMHG | DIASTOLIC BLOOD PRESSURE: 77 MMHG | RESPIRATION RATE: 18 BRPM | HEART RATE: 89 BPM | TEMPERATURE: 97 F

## 2025-04-22 DIAGNOSIS — M31.7 MICROSCOPIC POLYANGIITIS: Primary | ICD-10-CM

## 2025-04-22 PROCEDURE — 96415 CHEMO IV INFUSION ADDL HR: CPT

## 2025-04-22 PROCEDURE — 96366 THER/PROPH/DIAG IV INF ADDON: CPT

## 2025-04-22 PROCEDURE — 96375 TX/PRO/DX INJ NEW DRUG ADDON: CPT

## 2025-04-22 PROCEDURE — 96365 THER/PROPH/DIAG IV INF INIT: CPT

## 2025-04-22 PROCEDURE — 96367 TX/PROPH/DG ADDL SEQ IV INF: CPT

## 2025-04-22 PROCEDURE — 96413 CHEMO IV INFUSION 1 HR: CPT

## 2025-04-22 PROCEDURE — 63600175 PHARM REV CODE 636 W HCPCS: Mod: JZ,TB | Performed by: INTERNAL MEDICINE

## 2025-04-22 PROCEDURE — 25000003 PHARM REV CODE 250: Performed by: INTERNAL MEDICINE

## 2025-04-22 RX ORDER — FAMOTIDINE 10 MG/ML
20 INJECTION, SOLUTION INTRAVENOUS
OUTPATIENT
Start: 2025-04-29

## 2025-04-22 RX ORDER — ACETAMINOPHEN 325 MG/1
650 TABLET ORAL
Status: COMPLETED | OUTPATIENT
Start: 2025-04-22 | End: 2025-04-22

## 2025-04-22 RX ORDER — FAMOTIDINE 10 MG/ML
20 INJECTION, SOLUTION INTRAVENOUS
Status: COMPLETED | OUTPATIENT
Start: 2025-04-22 | End: 2025-04-22

## 2025-04-22 RX ORDER — HEPARIN 100 UNIT/ML
500 SYRINGE INTRAVENOUS
OUTPATIENT
Start: 2025-04-29

## 2025-04-22 RX ORDER — SODIUM CHLORIDE 0.9 % (FLUSH) 0.9 %
10 SYRINGE (ML) INJECTION
Status: DISCONTINUED | OUTPATIENT
Start: 2025-04-22 | End: 2025-04-22 | Stop reason: HOSPADM

## 2025-04-22 RX ORDER — EPINEPHRINE 0.3 MG/.3ML
0.3 INJECTION SUBCUTANEOUS ONCE AS NEEDED
Status: DISCONTINUED | OUTPATIENT
Start: 2025-04-22 | End: 2025-04-22 | Stop reason: HOSPADM

## 2025-04-22 RX ORDER — MEPERIDINE HYDROCHLORIDE 50 MG/ML
25 INJECTION INTRAMUSCULAR; INTRAVENOUS; SUBCUTANEOUS
OUTPATIENT
Start: 2025-04-29

## 2025-04-22 RX ORDER — DIPHENHYDRAMINE HYDROCHLORIDE 50 MG/ML
50 INJECTION, SOLUTION INTRAMUSCULAR; INTRAVENOUS ONCE AS NEEDED
OUTPATIENT
Start: 2025-04-29

## 2025-04-22 RX ORDER — EPINEPHRINE 0.3 MG/.3ML
0.3 INJECTION SUBCUTANEOUS ONCE AS NEEDED
OUTPATIENT
Start: 2025-04-29

## 2025-04-22 RX ORDER — SODIUM CHLORIDE 0.9 % (FLUSH) 0.9 %
10 SYRINGE (ML) INJECTION
OUTPATIENT
Start: 2025-04-29

## 2025-04-22 RX ORDER — METHYLPREDNISOLONE SOD SUCC 125 MG
125 VIAL (EA) INJECTION
Status: COMPLETED | OUTPATIENT
Start: 2025-04-22 | End: 2025-04-22

## 2025-04-22 RX ORDER — DIPHENHYDRAMINE HYDROCHLORIDE 50 MG/ML
50 INJECTION, SOLUTION INTRAMUSCULAR; INTRAVENOUS ONCE AS NEEDED
Status: DISCONTINUED | OUTPATIENT
Start: 2025-04-22 | End: 2025-04-22 | Stop reason: HOSPADM

## 2025-04-22 RX ORDER — ACETAMINOPHEN 325 MG/1
650 TABLET ORAL
OUTPATIENT
Start: 2025-04-29

## 2025-04-22 RX ORDER — METHYLPREDNISOLONE SOD SUCC 125 MG
125 VIAL (EA) INJECTION
OUTPATIENT
Start: 2025-04-29

## 2025-04-22 RX ORDER — MEPERIDINE HYDROCHLORIDE 50 MG/ML
25 INJECTION INTRAMUSCULAR; INTRAVENOUS; SUBCUTANEOUS
Status: DISCONTINUED | OUTPATIENT
Start: 2025-04-22 | End: 2025-04-22 | Stop reason: HOSPADM

## 2025-04-22 RX ADMIN — FAMOTIDINE 20 MG: 10 INJECTION INTRAVENOUS at 08:04

## 2025-04-22 RX ADMIN — ACETAMINOPHEN 650 MG: 325 TABLET ORAL at 08:04

## 2025-04-22 RX ADMIN — SODIUM CHLORIDE 800 MG: 9 INJECTION, SOLUTION INTRAVENOUS at 09:04

## 2025-04-22 RX ADMIN — SODIUM CHLORIDE 50 MG: 9 INJECTION, SOLUTION INTRAVENOUS at 08:04

## 2025-04-22 RX ADMIN — METHYLPREDNISOLONE SODIUM SUCCINATE 125 MG: 125 INJECTION, POWDER, FOR SOLUTION INTRAMUSCULAR; INTRAVENOUS at 08:04

## 2025-04-22 NOTE — PLAN OF CARE
Problem: Adult Inpatient Plan of Care  Goal: Plan of Care Review  Outcome: Progressing  Flowsheets (Taken 4/22/2025 0845)  Plan of Care Reviewed With: patient  Goal: Patient-Specific Goal (Individualized)  Outcome: Progressing  Flowsheets (Taken 4/22/2025 0845)  Individualized Care Needs: feet elevated pillow warm blanket and snack provided  Anxieties, Fears or Concerns: denies  Goal: Absence of Hospital-Acquired Illness or Injury  Outcome: Progressing  Intervention: Identify and Manage Fall Risk  Flowsheets (Taken 4/22/2025 0845)  Safety Promotion/Fall Prevention:   assistive device/personal item within reach   Fall Risk reviewed with patient/family   instructed to call staff for mobility   in recliner, wheels locked   medications reviewed   patient expresses understanding of fall risk and prevention  Intervention: Prevent Infection  Flowsheets (Taken 4/22/2025 0845)  Infection Prevention:   environmental surveillance performed   equipment surfaces disinfected   hand hygiene promoted   personal protective equipment utilized  Goal: Optimal Comfort and Wellbeing  Outcome: Progressing  Intervention: Monitor Pain and Promote Comfort  Flowsheets (Taken 4/22/2025 0845)  Pain Management Interventions:   relaxation techniques promoted   quiet environment facilitated   position adjusted   warm blanket provided  Intervention: Provide Person-Centered Care  Flowsheets (Taken 4/22/2025 0845)  Trust Relationship/Rapport:   care explained   questions encouraged   choices provided   reassurance provided   emotional support provided   thoughts/feelings acknowledged   empathic listening provided   questions answered     Problem: Infection  Goal: Absence of Infection Signs and Symptoms  Outcome: Progressing  Intervention: Prevent or Manage Infection  Flowsheets (Taken 4/22/2025 0845)  Infection Management: aseptic technique maintained

## 2025-04-29 ENCOUNTER — INFUSION (OUTPATIENT)
Dept: INFUSION THERAPY | Facility: HOSPITAL | Age: 50
End: 2025-04-29
Attending: INTERNAL MEDICINE
Payer: MEDICARE

## 2025-04-29 VITALS
OXYGEN SATURATION: 98 % | HEIGHT: 62 IN | SYSTOLIC BLOOD PRESSURE: 113 MMHG | RESPIRATION RATE: 16 BRPM | DIASTOLIC BLOOD PRESSURE: 75 MMHG | WEIGHT: 257.94 LBS | TEMPERATURE: 98 F | HEART RATE: 85 BPM | BODY MASS INDEX: 47.47 KG/M2

## 2025-04-29 DIAGNOSIS — M31.7 MICROSCOPIC POLYANGIITIS: Primary | ICD-10-CM

## 2025-04-29 PROCEDURE — 25000003 PHARM REV CODE 250: Performed by: INTERNAL MEDICINE

## 2025-04-29 PROCEDURE — 63600175 PHARM REV CODE 636 W HCPCS: Performed by: INTERNAL MEDICINE

## 2025-04-29 PROCEDURE — 96367 TX/PROPH/DG ADDL SEQ IV INF: CPT

## 2025-04-29 PROCEDURE — 96375 TX/PRO/DX INJ NEW DRUG ADDON: CPT

## 2025-04-29 PROCEDURE — 96366 THER/PROPH/DIAG IV INF ADDON: CPT

## 2025-04-29 PROCEDURE — 96365 THER/PROPH/DIAG IV INF INIT: CPT

## 2025-04-29 RX ORDER — METHYLPREDNISOLONE SOD SUCC 125 MG
125 VIAL (EA) INJECTION
OUTPATIENT
Start: 2025-05-06

## 2025-04-29 RX ORDER — ACETAMINOPHEN 325 MG/1
650 TABLET ORAL
OUTPATIENT
Start: 2025-05-06

## 2025-04-29 RX ORDER — SODIUM CHLORIDE 0.9 % (FLUSH) 0.9 %
10 SYRINGE (ML) INJECTION
Status: DISCONTINUED | OUTPATIENT
Start: 2025-04-29 | End: 2025-04-29 | Stop reason: HOSPADM

## 2025-04-29 RX ORDER — SODIUM CHLORIDE 0.9 % (FLUSH) 0.9 %
10 SYRINGE (ML) INJECTION
OUTPATIENT
Start: 2025-05-06

## 2025-04-29 RX ORDER — EPINEPHRINE 0.3 MG/.3ML
0.3 INJECTION SUBCUTANEOUS ONCE AS NEEDED
Status: DISCONTINUED | OUTPATIENT
Start: 2025-04-29 | End: 2025-04-29 | Stop reason: HOSPADM

## 2025-04-29 RX ORDER — EPINEPHRINE 0.3 MG/.3ML
0.3 INJECTION SUBCUTANEOUS ONCE AS NEEDED
OUTPATIENT
Start: 2025-05-06

## 2025-04-29 RX ORDER — FAMOTIDINE 10 MG/ML
20 INJECTION, SOLUTION INTRAVENOUS
OUTPATIENT
Start: 2025-05-06

## 2025-04-29 RX ORDER — DIPHENHYDRAMINE HYDROCHLORIDE 50 MG/ML
50 INJECTION, SOLUTION INTRAMUSCULAR; INTRAVENOUS ONCE AS NEEDED
OUTPATIENT
Start: 2025-05-06

## 2025-04-29 RX ORDER — DIPHENHYDRAMINE HYDROCHLORIDE 50 MG/ML
50 INJECTION, SOLUTION INTRAMUSCULAR; INTRAVENOUS ONCE AS NEEDED
Status: DISCONTINUED | OUTPATIENT
Start: 2025-04-29 | End: 2025-04-29 | Stop reason: HOSPADM

## 2025-04-29 RX ORDER — ACETAMINOPHEN 325 MG/1
650 TABLET ORAL
Status: COMPLETED | OUTPATIENT
Start: 2025-04-29 | End: 2025-04-29

## 2025-04-29 RX ORDER — HEPARIN 100 UNIT/ML
500 SYRINGE INTRAVENOUS
OUTPATIENT
Start: 2025-05-06

## 2025-04-29 RX ORDER — METHYLPREDNISOLONE SOD SUCC 125 MG
125 VIAL (EA) INJECTION
Status: COMPLETED | OUTPATIENT
Start: 2025-04-29 | End: 2025-04-29

## 2025-04-29 RX ORDER — FAMOTIDINE 10 MG/ML
20 INJECTION, SOLUTION INTRAVENOUS
Status: COMPLETED | OUTPATIENT
Start: 2025-04-29 | End: 2025-04-29

## 2025-04-29 RX ORDER — MEPERIDINE HYDROCHLORIDE 50 MG/ML
25 INJECTION INTRAMUSCULAR; INTRAVENOUS; SUBCUTANEOUS
OUTPATIENT
Start: 2025-05-06

## 2025-04-29 RX ADMIN — FAMOTIDINE 20 MG: 10 INJECTION INTRAVENOUS at 08:04

## 2025-04-29 RX ADMIN — SODIUM CHLORIDE: 9 INJECTION, SOLUTION INTRAVENOUS at 08:04

## 2025-04-29 RX ADMIN — ACETAMINOPHEN 650 MG: 325 TABLET ORAL at 08:04

## 2025-04-29 RX ADMIN — METHYLPREDNISOLONE SODIUM SUCCINATE 125 MG: 125 INJECTION, POWDER, FOR SOLUTION INTRAMUSCULAR; INTRAVENOUS at 08:04

## 2025-04-29 RX ADMIN — SODIUM CHLORIDE 50 MG: 9 INJECTION, SOLUTION INTRAVENOUS at 08:04

## 2025-04-29 RX ADMIN — SODIUM CHLORIDE 800 MG: 9 INJECTION, SOLUTION INTRAVENOUS at 09:04

## 2025-04-29 NOTE — PLAN OF CARE
Patient tolerated Ruxience well today; no adverse reaction noted.  No significant complaints voiced.  IV discontinued with catheter intact and pressure dressing applied to the site.  Has f/u appt(s) scheduled per MD request.  No questions or concerns voiced.  NAD noted upon discharge.          Problem: Adult Inpatient Plan of Care  Goal: Plan of Care Review  Outcome: Progressing  Flowsheets (Taken 4/29/2025 0934)  Plan of Care Reviewed With: patient  Goal: Patient-Specific Goal (Individualized)  Outcome: Progressing  Flowsheets (Taken 4/29/2025 0934)  Individualized Care Needs: pt likes feet elevated with warm blanket and pillow provided. Refreshments provided.  Anxieties, Fears or Concerns: PT denies  Patient/Family-Specific Goals (Include Timeframe): pt to tolerate Rituxan infusion well today with no adverse reactions.  Goal: Absence of Hospital-Acquired Illness or Injury  Outcome: Progressing  Intervention: Identify and Manage Fall Risk  Flowsheets (Taken 4/29/2025 0934)  Safety Promotion/Fall Prevention:   assistive device/personal item within reach   instructed to call staff for mobility   lighting adjusted   medications reviewed   nonskid shoes/socks when out of bed   in recliner, wheels locked  Intervention: Prevent Infection  Flowsheets (Taken 4/29/2025 0934)  Infection Prevention:   hand hygiene promoted   personal protective equipment utilized   equipment surfaces disinfected  Goal: Optimal Comfort and Wellbeing  Outcome: Progressing  Intervention: Monitor Pain and Promote Comfort  Flowsheets (Taken 4/29/2025 0934)  Pain Management Interventions:   position adjusted   pillow support provided   quiet environment facilitated   relaxation techniques promoted   warm blanket provided  Intervention: Provide Person-Centered Care  Flowsheets (Taken 4/29/2025 0934)  Trust Relationship/Rapport:   care explained   choices provided   emotional support provided   empathic listening provided   questions encouraged    questions answered   reassurance provided   thoughts/feelings acknowledged     Problem: Infection  Goal: Absence of Infection Signs and Symptoms  Outcome: Progressing  Intervention: Prevent or Manage Infection  Flowsheets (Taken 4/29/2025 2428)  Infection Management: aseptic technique maintained

## 2025-04-29 NOTE — DISCHARGE INSTRUCTIONS
Thank you for letting me take care of YOU!!    SCOTT Venegas [facility]  [address] [facility].  or  [id number] [place] [address] Drive  [phone] phone     [phone] fax  Hours of Operation: Monday- Friday 8:00am- 5:00pm  After hours phone  [phone]      Please don't hesitate to call if you have any concerns.      WAYS TO HELP PREVENT INFECTION        WASH YOUR HANDS OFTEN DURING THE DAY, ESPECIALLY BEFORE YOU EAT, AFTER USING THE BATHROOM, AND AFTER TOUCHING ANIMALS    STAY AWAY FROM PEOPLE WHO HAVE ILLNESSES YOU CAN CATCH; SUCH AS COLDS, FLU, CHICKEN POX    TRY TO AVOID CROWDS    STAY AWAY FROM CHILDREN WHO RECENTLY HAVE RECEIVED LIVE VIRUS VACCINES    MAINTAIN GOOD MOUTH CARE    DO NOT SQUEEZE OR SCRATCH PIMPLES    CLEAN CUTS & SCRAPES RIGHT AWAY AND DAILY UNTIL HEALED WITH WARM WATER, SOAP & AN ANTISEPTIC    AVOID CONTACT WITH LITTER BOXES, BIRD CAGES, & FISH TANKS    AVOID STANDING WATER, IE., BIRD BATHS, FLOWER POTS/VASES, OR HUMIDIFIERS    WEAR GLOVES WHEN GARDENING OR CLEANING UP AFTER OTHERS, ESPECIALLY BABIES & SMALL CHILDREN    DO NOT EAT RAW FISH, SEAFOOD, MEAT, OR EGGS

## 2025-05-06 ENCOUNTER — INFUSION (OUTPATIENT)
Dept: INFUSION THERAPY | Facility: HOSPITAL | Age: 50
End: 2025-05-06
Attending: INTERNAL MEDICINE
Payer: MEDICARE

## 2025-05-06 VITALS
HEIGHT: 62 IN | HEART RATE: 88 BPM | TEMPERATURE: 97 F | BODY MASS INDEX: 46.38 KG/M2 | WEIGHT: 252 LBS | OXYGEN SATURATION: 96 % | RESPIRATION RATE: 16 BRPM | DIASTOLIC BLOOD PRESSURE: 74 MMHG | SYSTOLIC BLOOD PRESSURE: 113 MMHG

## 2025-05-06 DIAGNOSIS — M31.7 MICROSCOPIC POLYANGIITIS: Primary | ICD-10-CM

## 2025-05-06 PROCEDURE — 96367 TX/PROPH/DG ADDL SEQ IV INF: CPT

## 2025-05-06 PROCEDURE — 25000003 PHARM REV CODE 250: Performed by: INTERNAL MEDICINE

## 2025-05-06 PROCEDURE — 96375 TX/PRO/DX INJ NEW DRUG ADDON: CPT

## 2025-05-06 PROCEDURE — 96365 THER/PROPH/DIAG IV INF INIT: CPT

## 2025-05-06 PROCEDURE — 63600175 PHARM REV CODE 636 W HCPCS: Mod: JZ,TB | Performed by: INTERNAL MEDICINE

## 2025-05-06 PROCEDURE — 96366 THER/PROPH/DIAG IV INF ADDON: CPT

## 2025-05-06 RX ORDER — MEPERIDINE HYDROCHLORIDE 50 MG/ML
25 INJECTION INTRAMUSCULAR; INTRAVENOUS; SUBCUTANEOUS
OUTPATIENT
Start: 2025-05-13

## 2025-05-06 RX ORDER — METHYLPREDNISOLONE SOD SUCC 125 MG
125 VIAL (EA) INJECTION
OUTPATIENT
Start: 2025-05-13

## 2025-05-06 RX ORDER — METHYLPREDNISOLONE SOD SUCC 125 MG
125 VIAL (EA) INJECTION
Status: COMPLETED | OUTPATIENT
Start: 2025-05-06 | End: 2025-05-06

## 2025-05-06 RX ORDER — HEPARIN 100 UNIT/ML
500 SYRINGE INTRAVENOUS
OUTPATIENT
Start: 2025-05-13

## 2025-05-06 RX ORDER — DIPHENHYDRAMINE HYDROCHLORIDE 50 MG/ML
50 INJECTION, SOLUTION INTRAMUSCULAR; INTRAVENOUS ONCE AS NEEDED
OUTPATIENT
Start: 2025-05-13

## 2025-05-06 RX ORDER — ACETAMINOPHEN 325 MG/1
650 TABLET ORAL
OUTPATIENT
Start: 2025-05-13

## 2025-05-06 RX ORDER — DIPHENHYDRAMINE HYDROCHLORIDE 50 MG/ML
50 INJECTION, SOLUTION INTRAMUSCULAR; INTRAVENOUS ONCE AS NEEDED
Status: DISCONTINUED | OUTPATIENT
Start: 2025-05-06 | End: 2025-05-06 | Stop reason: HOSPADM

## 2025-05-06 RX ORDER — SODIUM CHLORIDE 0.9 % (FLUSH) 0.9 %
10 SYRINGE (ML) INJECTION
Status: DISCONTINUED | OUTPATIENT
Start: 2025-05-06 | End: 2025-05-06 | Stop reason: HOSPADM

## 2025-05-06 RX ORDER — FAMOTIDINE 10 MG/ML
20 INJECTION, SOLUTION INTRAVENOUS
OUTPATIENT
Start: 2025-05-13

## 2025-05-06 RX ORDER — FAMOTIDINE 10 MG/ML
20 INJECTION, SOLUTION INTRAVENOUS
Status: COMPLETED | OUTPATIENT
Start: 2025-05-06 | End: 2025-05-06

## 2025-05-06 RX ORDER — EPINEPHRINE 0.3 MG/.3ML
0.3 INJECTION SUBCUTANEOUS ONCE AS NEEDED
Status: DISCONTINUED | OUTPATIENT
Start: 2025-05-06 | End: 2025-05-06 | Stop reason: HOSPADM

## 2025-05-06 RX ORDER — EPINEPHRINE 0.3 MG/.3ML
0.3 INJECTION SUBCUTANEOUS ONCE AS NEEDED
OUTPATIENT
Start: 2025-05-13

## 2025-05-06 RX ORDER — SODIUM CHLORIDE 0.9 % (FLUSH) 0.9 %
10 SYRINGE (ML) INJECTION
OUTPATIENT
Start: 2025-05-13

## 2025-05-06 RX ORDER — MEPERIDINE HYDROCHLORIDE 50 MG/ML
25 INJECTION INTRAMUSCULAR; INTRAVENOUS; SUBCUTANEOUS
Status: DISCONTINUED | OUTPATIENT
Start: 2025-05-06 | End: 2025-05-06 | Stop reason: HOSPADM

## 2025-05-06 RX ORDER — ACETAMINOPHEN 325 MG/1
650 TABLET ORAL
Status: COMPLETED | OUTPATIENT
Start: 2025-05-06 | End: 2025-05-06

## 2025-05-06 RX ADMIN — SODIUM CHLORIDE 800 MG: 9 INJECTION, SOLUTION INTRAVENOUS at 09:05

## 2025-05-06 RX ADMIN — ACETAMINOPHEN 650 MG: 325 TABLET ORAL at 08:05

## 2025-05-06 RX ADMIN — FAMOTIDINE 20 MG: 10 INJECTION, SOLUTION INTRAVENOUS at 08:05

## 2025-05-06 RX ADMIN — METHYLPREDNISOLONE SODIUM SUCCINATE 125 MG: 125 INJECTION, POWDER, FOR SOLUTION INTRAMUSCULAR; INTRAVENOUS at 08:05

## 2025-05-06 RX ADMIN — SODIUM CHLORIDE: 9 INJECTION, SOLUTION INTRAVENOUS at 08:05

## 2025-05-06 RX ADMIN — SODIUM CHLORIDE 50 MG: 9 INJECTION, SOLUTION INTRAVENOUS at 08:05

## 2025-05-06 NOTE — PLAN OF CARE
Patient tolerated Rituxan 3/4 well today; no adverse reaction noted.  No significant complaints voiced.  IV discontinued with catheter intact and pressure dressing applied to the site.  Has f/u appt(s) scheduled per MD request.  No questions or concerns voiced.  NAD noted upon discharge.         Problem: Adult Inpatient Plan of Care  Goal: Plan of Care Review  Outcome: Progressing  Flowsheets (Taken 5/6/2025 0845)  Plan of Care Reviewed With: patient  Goal: Patient-Specific Goal (Individualized)  Outcome: Progressing  Flowsheets (Taken 5/6/2025 0845)  Individualized Care Needs: pt likes feet elevated with warm blanket and sprite zero provided  Anxieties, Fears or Concerns: Pt denies  Patient/Family-Specific Goals (Include Timeframe): pt to tolerate Rituxan infusion well with no adverse reactions.  Goal: Absence of Hospital-Acquired Illness or Injury  Outcome: Progressing  Intervention: Identify and Manage Fall Risk  Flowsheets (Taken 5/6/2025 0845)  Safety Promotion/Fall Prevention:   assistive device/personal item within reach   in recliner, wheels locked   instructed to call staff for mobility   lighting adjusted   family to remain at bedside   medications reviewed   nonskid shoes/socks when out of bed  Intervention: Prevent Infection  Flowsheets (Taken 5/6/2025 0845)  Infection Prevention:   personal protective equipment utilized   hand hygiene promoted   equipment surfaces disinfected  Goal: Optimal Comfort and Wellbeing  Outcome: Progressing  Intervention: Monitor Pain and Promote Comfort  Flowsheets (Taken 5/6/2025 0845)  Pain Management Interventions:   position adjusted   quiet environment facilitated   relaxation techniques promoted   warm blanket provided  Intervention: Provide Person-Centered Care  Flowsheets (Taken 5/6/2025 0845)  Trust Relationship/Rapport:   care explained   choices provided   emotional support provided   empathic listening provided   questions answered   questions encouraged    reassurance provided   thoughts/feelings acknowledged     Problem: Infection  Goal: Absence of Infection Signs and Symptoms  Outcome: Progressing  Intervention: Prevent or Manage Infection  Flowsheets (Taken 5/6/2025 0845)  Infection Management: aseptic technique maintained     Problem: Fall Injury Risk  Goal: Absence of Fall and Fall-Related Injury  Outcome: Progressing  Intervention: Identify and Manage Contributors  Flowsheets (Taken 5/6/2025 0845)  Medication Review/Management: medications reviewed  Intervention: Promote Injury-Free Environment  Flowsheets (Taken 5/6/2025 0845)  Safety Promotion/Fall Prevention:   assistive device/personal item within reach   in recliner, wheels locked   instructed to call staff for mobility   lighting adjusted   family to remain at bedside   medications reviewed   nonskid shoes/socks when out of bed

## 2025-05-06 NOTE — DISCHARGE INSTRUCTIONS
Thank you for letting me take care of YOU!!    SCOTT Venegas    [facility]  [address] [facility].  or  [id number] [place] [address] Drive  [phone] phone     [phone] fax  Hours of Operation: Monday- Friday 8:00am- 5:00pm  After hours phone  [phone]      Rheumatology Physicians :  Dr. Poornima Cardenas    Please don't hesitate to call, if you have any concerns.              WAYS TO HELP PREVENT INFECTION      WASH YOUR HANDS OFTEN DURING THE DAY, ESPECIALLY BEFORE YOU EAT, AFTER USING THE BATHROOM, AND AFTER TOUCHING ANIMALS    STAY AWAY FROM PEOPLE WHO HAVE ILLNESSES YOU CAN CATCH; SUCH AS COLDS, FLU, CHICKEN POX    TRY TO AVOID CROWDS    STAY AWAY FROM CHILDREN WHO RECENTLY HAVE RECEIVED LIVE VIRUS VACCINES    MAINTAIN GOOD MOUTH CARE    DO NOT SQUEEZE OR SCRATCH PIMPLES    CLEAN CUTS & SCRAPES RIGHT AWAY AND DAILY UNTIL HEALED WITH WARM WATER, SOAP & AN ANTISEPTIC    AVOID CONTACT WITH LITTER BOXES, BIRD CAGES, & FISH TANKS    AVOID STANDING WATER, IE., BIRD BATHS, FLOWER POTS/VASES, OR HUMIDIFIERS    WEAR GLOVES WHEN GARDENING OR CLEANING UP AFTER OTHERS, ESPECIALLY BABIES & SMALL CHILDREN    DO NOT EAT RAW FISH, SEAFOOD, MEAT, OR EGGS      FALL PREVENTION   Falls often occur due to slipping, tripping or losing your balance. Here are ways to reduce your risk of falling again.   Was there anything that caused your fall that can be fixed, removed or replaced?   Make your home safe by keeping walkways clear of objects you may trip over.   Use non-slip pads under rugs.   Do not walk in poorly lit areas.   Do not stand on chairs or wobbly ladders.   Use caution when reaching overhead or looking upward. This position can cause a loss of balance.   Be sure your shoes fit properly, have non-slip bottoms and are in good condition.   Be cautious when going up and down stairs, curbs, and when walking on uneven sidewalks.   If your balance is poor, consider using a cane or  walker.   If your fall was related to alcohol use, stop or limit alcohol intake.   If your fall was related to use of sleeping medicines, talk to your doctor about this. You may need to reduce your dosage at bedtime if you awaken during the night to go to the bathroom.   To reduce the need for nighttime bathroom trips:   Avoid drinking fluids for several hours before going to bed   Empty your bladder before going to bed   Men can keep a urinal at the bedside   © 1655-5165 [employer] [facility] [place], [address] [place] [address], [place] [address]. All rights reserved. This information is not intended as a substitute for professional medical care. Always follow your healthcare professional's instructions.

## 2025-05-13 ENCOUNTER — INFUSION (OUTPATIENT)
Dept: INFUSION THERAPY | Facility: HOSPITAL | Age: 50
End: 2025-05-13
Attending: INTERNAL MEDICINE
Payer: MEDICARE

## 2025-05-13 VITALS
BODY MASS INDEX: 46.78 KG/M2 | RESPIRATION RATE: 16 BRPM | HEIGHT: 62 IN | SYSTOLIC BLOOD PRESSURE: 113 MMHG | DIASTOLIC BLOOD PRESSURE: 70 MMHG | HEART RATE: 88 BPM | TEMPERATURE: 97 F | WEIGHT: 254.19 LBS | OXYGEN SATURATION: 98 %

## 2025-05-13 DIAGNOSIS — M31.7 MICROSCOPIC POLYANGIITIS: Primary | ICD-10-CM

## 2025-05-13 PROCEDURE — 63600175 PHARM REV CODE 636 W HCPCS: Mod: JZ,TB | Performed by: INTERNAL MEDICINE

## 2025-05-13 PROCEDURE — 96366 THER/PROPH/DIAG IV INF ADDON: CPT

## 2025-05-13 PROCEDURE — 96365 THER/PROPH/DIAG IV INF INIT: CPT

## 2025-05-13 PROCEDURE — 25000003 PHARM REV CODE 250: Performed by: INTERNAL MEDICINE

## 2025-05-13 PROCEDURE — 96415 CHEMO IV INFUSION ADDL HR: CPT

## 2025-05-13 PROCEDURE — 96375 TX/PRO/DX INJ NEW DRUG ADDON: CPT

## 2025-05-13 PROCEDURE — 96413 CHEMO IV INFUSION 1 HR: CPT

## 2025-05-13 PROCEDURE — 96367 TX/PROPH/DG ADDL SEQ IV INF: CPT

## 2025-05-13 RX ORDER — EPINEPHRINE 0.3 MG/.3ML
0.3 INJECTION SUBCUTANEOUS ONCE AS NEEDED
OUTPATIENT
Start: 2025-05-13

## 2025-05-13 RX ORDER — MEPERIDINE HYDROCHLORIDE 50 MG/ML
25 INJECTION INTRAMUSCULAR; INTRAVENOUS; SUBCUTANEOUS
OUTPATIENT
Start: 2025-05-13

## 2025-05-13 RX ORDER — FAMOTIDINE 10 MG/ML
20 INJECTION, SOLUTION INTRAVENOUS
Status: COMPLETED | OUTPATIENT
Start: 2025-05-13 | End: 2025-05-13

## 2025-05-13 RX ORDER — ACETAMINOPHEN 325 MG/1
650 TABLET ORAL
Status: CANCELLED | OUTPATIENT
Start: 2025-05-13

## 2025-05-13 RX ORDER — METHYLPREDNISOLONE SOD SUCC 125 MG
125 VIAL (EA) INJECTION
Status: CANCELLED | OUTPATIENT
Start: 2025-05-13

## 2025-05-13 RX ORDER — SODIUM CHLORIDE 0.9 % (FLUSH) 0.9 %
10 SYRINGE (ML) INJECTION
Status: DISCONTINUED | OUTPATIENT
Start: 2025-05-13 | End: 2025-05-13 | Stop reason: HOSPADM

## 2025-05-13 RX ORDER — SODIUM CHLORIDE 0.9 % (FLUSH) 0.9 %
10 SYRINGE (ML) INJECTION
OUTPATIENT
Start: 2025-05-13

## 2025-05-13 RX ORDER — DIPHENHYDRAMINE HYDROCHLORIDE 50 MG/ML
50 INJECTION, SOLUTION INTRAMUSCULAR; INTRAVENOUS ONCE AS NEEDED
Status: DISCONTINUED | OUTPATIENT
Start: 2025-05-13 | End: 2025-05-13 | Stop reason: HOSPADM

## 2025-05-13 RX ORDER — ACETAMINOPHEN 325 MG/1
650 TABLET ORAL
Status: COMPLETED | OUTPATIENT
Start: 2025-05-13 | End: 2025-05-13

## 2025-05-13 RX ORDER — HEPARIN 100 UNIT/ML
500 SYRINGE INTRAVENOUS
OUTPATIENT
Start: 2025-05-13

## 2025-05-13 RX ORDER — METHYLPREDNISOLONE SOD SUCC 125 MG
125 VIAL (EA) INJECTION
Status: COMPLETED | OUTPATIENT
Start: 2025-05-13 | End: 2025-05-13

## 2025-05-13 RX ORDER — FAMOTIDINE 10 MG/ML
20 INJECTION, SOLUTION INTRAVENOUS
Status: CANCELLED | OUTPATIENT
Start: 2025-05-13

## 2025-05-13 RX ORDER — DIPHENHYDRAMINE HYDROCHLORIDE 50 MG/ML
50 INJECTION, SOLUTION INTRAMUSCULAR; INTRAVENOUS ONCE AS NEEDED
OUTPATIENT
Start: 2025-05-13

## 2025-05-13 RX ORDER — EPINEPHRINE 0.3 MG/.3ML
0.3 INJECTION SUBCUTANEOUS ONCE AS NEEDED
Status: DISCONTINUED | OUTPATIENT
Start: 2025-05-13 | End: 2025-05-13 | Stop reason: HOSPADM

## 2025-05-13 RX ADMIN — METHYLPREDNISOLONE SODIUM SUCCINATE 125 MG: 125 INJECTION, POWDER, FOR SOLUTION INTRAMUSCULAR; INTRAVENOUS at 09:05

## 2025-05-13 RX ADMIN — SODIUM CHLORIDE 800 MG: 9 INJECTION, SOLUTION INTRAVENOUS at 09:05

## 2025-05-13 RX ADMIN — DIPHENHYDRAMINE HYDROCHLORIDE 50 MG: 50 INJECTION INTRAMUSCULAR; INTRAVENOUS at 09:05

## 2025-05-13 RX ADMIN — FAMOTIDINE 20 MG: 10 INJECTION INTRAVENOUS at 09:05

## 2025-05-13 RX ADMIN — ACETAMINOPHEN 650 MG: 325 TABLET ORAL at 09:05

## 2025-05-13 NOTE — PLAN OF CARE
Problem: Adult Inpatient Plan of Care  Goal: Plan of Care Review  Outcome: Progressing  Flowsheets (Taken 5/13/2025 0858)  Plan of Care Reviewed With: patient  Goal: Patient-Specific Goal (Individualized)  Outcome: Progressing  Flowsheets (Taken 5/13/2025 0858)  Individualized Care Needs: feet elevated pilloe warm blanket and snack provided  Anxieties, Fears or Concerns: denies  Goal: Absence of Hospital-Acquired Illness or Injury  Outcome: Progressing  Intervention: Identify and Manage Fall Risk  Flowsheets (Taken 5/13/2025 0858)  Safety Promotion/Fall Prevention:   assistive device/personal item within reach   Fall Risk reviewed with patient/family   medications reviewed   in recliner, wheels locked   patient expresses understanding of fall risk and prevention  Intervention: Prevent Infection  Flowsheets (Taken 5/13/2025 0858)  Infection Prevention:   environmental surveillance performed   equipment surfaces disinfected   hand hygiene promoted   personal protective equipment utilized  Goal: Optimal Comfort and Wellbeing  Outcome: Progressing  Intervention: Monitor Pain and Promote Comfort  Flowsheets (Taken 5/13/2025 0858)  Pain Management Interventions:   quiet environment facilitated   relaxation techniques promoted   pillow support provided   warm blanket provided  Intervention: Provide Person-Centered Care  Flowsheets (Taken 5/13/2025 0858)  Trust Relationship/Rapport:   care explained   questions encouraged   choices provided   reassurance provided   emotional support provided   thoughts/feelings acknowledged   empathic listening provided   questions answered     Problem: Infection  Goal: Absence of Infection Signs and Symptoms  Outcome: Progressing  Intervention: Prevent or Manage Infection  Flowsheets (Taken 5/13/2025 0858)  Infection Management: aseptic technique maintained     Problem: Fall Injury Risk  Goal: Absence of Fall and Fall-Related Injury  Outcome: Progressing  Intervention: Identify and  Manage Contributors  Flowsheets (Taken 5/13/2025 0858)  Self-Care Promotion: independence encouraged  Medication Review/Management: medications reviewed  Intervention: Promote Injury-Free Environment  Flowsheets (Taken 5/13/2025 0858)  Safety Promotion/Fall Prevention:   assistive device/personal item within reach   Fall Risk reviewed with patient/family   medications reviewed   in recliner, wheels locked   patient expresses understanding of fall risk and prevention

## 2025-05-14 ENCOUNTER — TELEPHONE (OUTPATIENT)
Dept: RHEUMATOLOGY | Facility: CLINIC | Age: 50
End: 2025-05-14
Payer: MEDICARE

## 2025-05-14 DIAGNOSIS — M31.7 MICROSCOPIC POLYANGIITIS: Primary | ICD-10-CM

## 2025-05-14 NOTE — TELEPHONE ENCOUNTER
Patient RS to 5/20.  Do you what her to do any labs prior?    No mention of labs at her last visit but patient wanted to double check, can go Monday if needed    3/26/2025:  Severe vasculitis flare. Increasing prednisone to 40 mg. Labs, cxr and ECHO stat. Rituxan vasculitis protocol weekly for 4 doses. Follow up one week after completion for further treatment decision.

## 2025-05-14 NOTE — TELEPHONE ENCOUNTER
----- Message from Lamar sent at 5/14/2025  2:05 PM CDT -----  Contact: self  Patient is requesting a call back regarding rescheduling (family emergency). Please call back at [phone]  ----- Message -----  From: Lamar Maya  Sent: 5/14/2025   2:06 PM CDT  To: [facility] Staff    Patient is requesting a call back regarding rescheduling. Please call back at [phone]

## 2025-05-19 ENCOUNTER — LAB VISIT (OUTPATIENT)
Dept: LAB | Facility: HOSPITAL | Age: 50
End: 2025-05-19
Attending: INTERNAL MEDICINE
Payer: MEDICARE

## 2025-05-19 DIAGNOSIS — M31.7 MICROSCOPIC POLYANGIITIS: ICD-10-CM

## 2025-05-19 LAB
ABSOLUTE EOSINOPHIL (OHS): 0.15 K/UL
ABSOLUTE MONOCYTE (OHS): 1.17 K/UL (ref 0.3–1)
ABSOLUTE NEUTROPHIL COUNT (OHS): 4.53 K/UL (ref 1.8–7.7)
ALBUMIN SERPL BCP-MCNC: 3.5 G/DL (ref 3.5–5.2)
ALP SERPL-CCNC: 81 UNIT/L (ref 40–150)
ALT SERPL W/O P-5'-P-CCNC: 28 UNIT/L (ref 10–44)
ANION GAP (OHS): 10 MMOL/L (ref 8–16)
AST SERPL-CCNC: 20 UNIT/L (ref 11–45)
BASOPHILS # BLD AUTO: 0.03 K/UL
BASOPHILS NFR BLD AUTO: 0.4 %
BILIRUB SERPL-MCNC: 0.3 MG/DL (ref 0.1–1)
BUN SERPL-MCNC: 20 MG/DL (ref 6–20)
CALCIUM SERPL-MCNC: 9.8 MG/DL (ref 8.7–10.5)
CHLORIDE SERPL-SCNC: 103 MMOL/L (ref 95–110)
CO2 SERPL-SCNC: 23 MMOL/L (ref 23–29)
CREAT SERPL-MCNC: 1.3 MG/DL (ref 0.5–1.4)
CRP SERPL-MCNC: 31.8 MG/L
ERYTHROCYTE [DISTWIDTH] IN BLOOD BY AUTOMATED COUNT: 12.7 % (ref 11.5–14.5)
ERYTHROCYTE [SEDIMENTATION RATE] IN BLOOD: 49 MM/HR
GFR SERPLBLD CREATININE-BSD FMLA CKD-EPI: 50 ML/MIN/1.73/M2
GLUCOSE SERPL-MCNC: 109 MG/DL (ref 70–110)
HCT VFR BLD AUTO: 39.7 % (ref 37–48.5)
HGB BLD-MCNC: 12.8 GM/DL (ref 12–16)
IMM GRANULOCYTES # BLD AUTO: 0.06 K/UL (ref 0–0.04)
IMM GRANULOCYTES NFR BLD AUTO: 0.7 % (ref 0–0.5)
LYMPHOCYTES # BLD AUTO: 2.33 K/UL (ref 1–4.8)
MCH RBC QN AUTO: 29.8 PG (ref 27–31)
MCHC RBC AUTO-ENTMCNC: 32.2 G/DL (ref 32–36)
MCV RBC AUTO: 92 FL (ref 82–98)
NUCLEATED RBC (/100WBC) (OHS): 0 /100 WBC
PLATELET # BLD AUTO: 335 K/UL (ref 150–450)
PMV BLD AUTO: 11.5 FL (ref 9.2–12.9)
POTASSIUM SERPL-SCNC: 4.4 MMOL/L (ref 3.5–5.1)
PROT SERPL-MCNC: 7.9 GM/DL (ref 6–8.4)
RBC # BLD AUTO: 4.3 M/UL (ref 4–5.4)
RELATIVE EOSINOPHIL (OHS): 1.8 %
RELATIVE LYMPHOCYTE (OHS): 28.2 % (ref 18–48)
RELATIVE MONOCYTE (OHS): 14.1 % (ref 4–15)
RELATIVE NEUTROPHIL (OHS): 54.8 % (ref 38–73)
SODIUM SERPL-SCNC: 136 MMOL/L (ref 136–145)
WBC # BLD AUTO: 8.27 K/UL (ref 3.9–12.7)

## 2025-05-19 PROCEDURE — 86140 C-REACTIVE PROTEIN: CPT

## 2025-05-19 PROCEDURE — 85652 RBC SED RATE AUTOMATED: CPT

## 2025-05-19 PROCEDURE — 36415 COLL VENOUS BLD VENIPUNCTURE: CPT

## 2025-05-19 PROCEDURE — 82040 ASSAY OF SERUM ALBUMIN: CPT

## 2025-05-19 PROCEDURE — 85025 COMPLETE CBC W/AUTO DIFF WBC: CPT

## 2025-05-19 PROCEDURE — 83516 IMMUNOASSAY NONANTIBODY: CPT

## 2025-05-20 ENCOUNTER — HOSPITAL ENCOUNTER (OUTPATIENT)
Dept: RADIOLOGY | Facility: HOSPITAL | Age: 50
Discharge: HOME | End: 2025-05-20
Attending: INTERNAL MEDICINE
Payer: MEDICARE

## 2025-05-20 ENCOUNTER — RESULTS FOLLOW-UP (OUTPATIENT)
Dept: RHEUMATOLOGY | Facility: CLINIC | Age: 50
End: 2025-05-20

## 2025-05-20 ENCOUNTER — OFFICE VISIT (OUTPATIENT)
Dept: OTOLARYNGOLOGY | Facility: CLINIC | Age: 50
End: 2025-05-20
Payer: MEDICARE

## 2025-05-20 ENCOUNTER — OFFICE VISIT (OUTPATIENT)
Dept: RHEUMATOLOGY | Facility: CLINIC | Age: 50
End: 2025-05-20
Payer: MEDICARE

## 2025-05-20 VITALS
HEART RATE: 97 BPM | HEIGHT: 62 IN | WEIGHT: 251.31 LBS | BODY MASS INDEX: 46.25 KG/M2 | SYSTOLIC BLOOD PRESSURE: 123 MMHG | DIASTOLIC BLOOD PRESSURE: 90 MMHG

## 2025-05-20 VITALS — WEIGHT: 251.31 LBS | BODY MASS INDEX: 46.25 KG/M2 | HEIGHT: 62 IN

## 2025-05-20 DIAGNOSIS — H92.03 ACUTE EAR PAIN, BILATERAL: ICD-10-CM

## 2025-05-20 DIAGNOSIS — Z51.81 ENCOUNTER FOR MEDICATION MONITORING: ICD-10-CM

## 2025-05-20 DIAGNOSIS — D84.821 DRUG-INDUCED IMMUNODEFICIENCY: ICD-10-CM

## 2025-05-20 DIAGNOSIS — M79.7 FIBROMYALGIA: ICD-10-CM

## 2025-05-20 DIAGNOSIS — M54.50 CHRONIC MIDLINE LOW BACK PAIN WITHOUT SCIATICA: ICD-10-CM

## 2025-05-20 DIAGNOSIS — D68.61 ANTIPHOSPHOLIPID ANTIBODY WITH HYPERCOAGULABLE STATE: ICD-10-CM

## 2025-05-20 DIAGNOSIS — G89.29 CHRONIC MIDLINE LOW BACK PAIN WITHOUT SCIATICA: ICD-10-CM

## 2025-05-20 DIAGNOSIS — M31.7 MICROSCOPIC POLYANGIITIS: Primary | ICD-10-CM

## 2025-05-20 DIAGNOSIS — Z79.899 DRUG-INDUCED IMMUNODEFICIENCY: ICD-10-CM

## 2025-05-20 PROCEDURE — 99999 PR PBB SHADOW E&M-EST. PATIENT-LVL III: CPT | Mod: PBBFAC,,, | Performed by: PHYSICIAN ASSISTANT

## 2025-05-20 PROCEDURE — 1159F MED LIST DOCD IN RCRD: CPT | Mod: CPTII,S$GLB,, | Performed by: PHYSICIAN ASSISTANT

## 2025-05-20 PROCEDURE — 99203 OFFICE O/P NEW LOW 30 MIN: CPT | Mod: S$GLB,,, | Performed by: PHYSICIAN ASSISTANT

## 2025-05-20 PROCEDURE — 3008F BODY MASS INDEX DOCD: CPT | Mod: CPTII,S$GLB,, | Performed by: PHYSICIAN ASSISTANT

## 2025-05-20 PROCEDURE — G2211 COMPLEX E/M VISIT ADD ON: HCPCS | Mod: S$GLB,,, | Performed by: INTERNAL MEDICINE

## 2025-05-20 PROCEDURE — 72100 X-RAY EXAM L-S SPINE 2/3 VWS: CPT | Mod: TC

## 2025-05-20 PROCEDURE — 99215 OFFICE O/P EST HI 40 MIN: CPT | Mod: S$GLB,,, | Performed by: INTERNAL MEDICINE

## 2025-05-20 PROCEDURE — 3074F SYST BP LT 130 MM HG: CPT | Mod: CPTII,S$GLB,, | Performed by: INTERNAL MEDICINE

## 2025-05-20 PROCEDURE — 72100 X-RAY EXAM L-S SPINE 2/3 VWS: CPT | Mod: 26,,, | Performed by: RADIOLOGY

## 2025-05-20 PROCEDURE — 99999 PR PBB SHADOW E&M-EST. PATIENT-LVL IV: CPT | Mod: PBBFAC,,, | Performed by: INTERNAL MEDICINE

## 2025-05-20 PROCEDURE — 3008F BODY MASS INDEX DOCD: CPT | Mod: CPTII,S$GLB,, | Performed by: INTERNAL MEDICINE

## 2025-05-20 PROCEDURE — 3080F DIAST BP >= 90 MM HG: CPT | Mod: CPTII,S$GLB,, | Performed by: INTERNAL MEDICINE

## 2025-05-20 RX ORDER — PREGABALIN 75 MG/1
75 CAPSULE ORAL 3 TIMES DAILY
Qty: 90 CAPSULE | Refills: 3 | Status: SHIPPED | OUTPATIENT
Start: 2025-05-20

## 2025-05-20 NOTE — PROGRESS NOTES
"Subjective:   Patient ID: Denise Flower is a 50 y.o. female.    Chief Complaint: Ear Problem    Ms. Flower is a very pleasant 49 yo female here to see me today with bilateral ear pain x  several months. She has been treated with 2 round of antibiotics with no relief. Denies grinding teeth at night but does state her dentures fit poorly. She has had to have them shaved down twice. She describes bilateral ear pressure.       Review of patient's allergies indicates:   Allergen Reactions    Oranges [orange] Hives           Review of Systems   Constitutional:  Negative for chills, fatigue, fever and unexpected weight change.   HENT:  Positive for ear pain. Negative for congestion, dental problem, ear discharge, facial swelling, hearing loss, nosebleeds, postnasal drip, rhinorrhea, sinus pressure, sneezing, sore throat, tinnitus, trouble swallowing and voice change.    Eyes:  Negative for redness, itching and visual disturbance.   Respiratory:  Negative for cough, choking, shortness of breath and wheezing.    Cardiovascular:  Negative for chest pain and palpitations.   Gastrointestinal:  Negative for abdominal pain.        No reflux.   Musculoskeletal:  Negative for gait problem.   Skin:  Negative for rash.   Neurological:  Negative for dizziness, light-headedness and headaches.         Objective:   Ht 5' 2" (1.575 m)   Wt 114 kg (251 lb 5.2 oz)   BMI 45.97 kg/m²     Physical Exam  Constitutional:       General: She is not in acute distress.     Appearance: She is well-developed.   HENT:      Head: Normocephalic and atraumatic.      Right Ear: Tympanic membrane, ear canal and external ear normal.      Left Ear: Tympanic membrane, ear canal and external ear normal.      Nose: Nose normal. No nasal deformity, septal deviation, mucosal edema or rhinorrhea.      Right Sinus: No maxillary sinus tenderness or frontal sinus tenderness.      Left Sinus: No maxillary sinus tenderness or frontal sinus tenderness.      " Mouth/Throat:      Mouth: Mucous membranes are not pale and not dry.      Dentition: No dental caries.      Pharynx: Uvula midline. No oropharyngeal exudate or posterior oropharyngeal erythema.   Eyes:      General: Lids are normal. No scleral icterus.     Extraocular Movements:      Right eye: Normal extraocular motion and no nystagmus.      Left eye: Normal extraocular motion and no nystagmus.      Conjunctiva/sclera: Conjunctivae normal.      Right eye: Right conjunctiva is not injected. No chemosis.     Left eye: Left conjunctiva is not injected. No chemosis.     Pupils: Pupils are equal, round, and reactive to light.   Neck:      Thyroid: No thyroid mass or thyromegaly.      Trachea: Trachea and phonation normal. No tracheal tenderness or tracheal deviation.   Pulmonary:      Effort: Pulmonary effort is normal. No respiratory distress.      Breath sounds: No stridor.   Abdominal:      General: There is no distension.   Lymphadenopathy:      Head:      Right side of head: No submental, submandibular, preauricular, posterior auricular or occipital adenopathy.      Left side of head: No submental, submandibular, preauricular, posterior auricular or occipital adenopathy.      Cervical: No cervical adenopathy.   Skin:     General: Skin is warm and dry.      Findings: No erythema or rash.   Neurological:      Mental Status: She is alert and oriented to person, place, and time.      Cranial Nerves: No cranial nerve deficit.   Psychiatric:         Behavior: Behavior normal.          Imaging : normal tymps bilaterally    Assessment:     1. Acute ear pain, bilateral        Plan:     Acute ear pain, bilateral  -     Ambulatory referral/consult to ENT      Normal ear exam and no signs of infection or effusion. Discussed other etiologies such as dental issues vs ETD. She will schedule and appointment with dentist to check poorly fitting dentures.    We had a long discussion regarding the anatomy and function of the  eustachian tube.  We discussed that the eustachian tube acts as a pump to keep the appropriate amount of pressure behind the ear drum.  I gave the patient a prescription for a nasal steroid spray to be used on a daily basis, and we discussed that it will take 2-3 weeks of daily use to achieve maximal effectiveness.

## 2025-05-20 NOTE — PROGRESS NOTES
RHEUMATOLOGY CLINIC FOLLOW UP VISIT  Chief complaints, HPI, ROS, EXAM, Assessment & Plans:-  Denise Sapp a 50 y.o. pleasant female comes in for follow-up visit.  Complains of severe worsening bilateral ear pain.  Have been treated with 2 courses of antibiotics by primary care physician in the past month with no improvement of ear pain.  Complains of worsening lower back pain.  Significant improvement of small joint pain and stiffness since receiving rituximab infusion.  No adverse reactions.  Rheumatological review of system negative otherwise.  Physical examination shows mild synovitis of multiple MCP and PIP joints significant soft tissue tenderness.  Significant lower back tenderness present around lower lumbar region..    1. Microscopic polyangiitis    2. Antiphospholipid antibody with hypercoagulable state    3. Drug-induced immunodeficiency    4. Encounter for medication monitoring    5. Chronic midline low back pain without sciatica    6. Fibromyalgia    7. Acute ear pain, bilateral      Problem List Items Addressed This Visit       Acute ear pain, bilateral    Relevant Orders    Ambulatory referral/consult to ENT    Antiphospholipid antibody with hypercoagulable state    Chronic midline low back pain without sciatica    Relevant Orders    X-Ray Lumbar Spine AP And Lateral (Completed)    Drug-induced immunodeficiency    Encounter for medication monitoring    Fibromyalgia    Relevant Medications    pregabalin (LYRICA) 75 MG capsule    Microscopic polyangiitis - Primary    Relevant Medications    pregabalin (LYRICA) 75 MG capsule    Other Relevant Orders    NM Joint Scan Whole Body       Labs reviewed today:-   Latest Reference Range & Units 05/19/25 10:26   WBC 3.90 - 12.70 K/uL 8.27   RBC 4.00 - 5.40 M/uL 4.30   Hemoglobin 12.0 - 16.0 gm/dL 12.8   Hematocrit 37.0 - 48.5 % 39.7   MCV 82 - 98 fL 92   MCH 27.0 - 31.0 pg 29.8   MCHC 32.0 - 36.0 g/dL  32.2   RDW 11.5 - 14.5 % 12.7   Platelet Count 150 - 450 K/uL 335   MPV 9.2 - 12.9 fL 11.5   Neut % 38 - 73 % 54.8   Lymph % 18 - 48 % 28.2   Mono % 4 - 15 % 14.1   Eos % <=8 % 1.8   Basophil % <=1.9 % 0.4   Immature Granulocytes 0.0 - 0.5 % 0.7 (H)   Gran # (ANC) 1.8 - 7.7 K/uL 4.53   Lymph # 1 - 4.8 K/uL 2.33   Mono # 0.3 - 1 K/uL 1.17 (H)   Eos # <=0.5 K/uL 0.15   Baso # <=0.2 K/uL 0.03   Immature Grans (Abs) 0.00 - 0.04 K/uL 0.06 (H)   nRBC <=0 /100 WBC 0   Sed Rate <=36 mm/hr 49 (H)   Sodium 136 - 145 mmol/L 136   Potassium 3.5 - 5.1 mmol/L 4.4   Chloride 95 - 110 mmol/L 103   CO2 23 - 29 mmol/L 23   Anion Gap 8 - 16 mmol/L 10   BUN 6 - 20 mg/dL 20   Creatinine 0.5 - 1.4 mg/dL 1.3   eGFR >60 mL/min/1.73/m2 50 (L)   Glucose 70 - 110 mg/dL 109   Calcium 8.7 - 10.5 mg/dL 9.8   ALP 40 - 150 unit/L 81   PROTEIN TOTAL 6.0 - 8.4 gm/dL 7.9   Albumin 3.5 - 5.2 g/dL 3.5   BILIRUBIN TOTAL 0.1 - 1.0 mg/dL 0.3   AST 11 - 45 unit/L 20   ALT 10 - 44 unit/L 28   CRP <=8.2 mg/L 31.8 (H)   (H): Data is abnormally high  (L): Data is abnormally low        Acute worsening bilateral ear pain with significantly elevated inflammation markers.  Treated by primary care physician with multiple rounds of antibiotics.  Scheduled to see ENT specialist today for further management due to immunocompromised state.  Severe inflammatory arthritis with microscopic polyangiitis complicated by antiphospholipid antibody syndrome on rituximab pulse therapy.  Completed weekly dosing for 4 infusions.  Tolerated well.  Still has significant joint tenderness and mild synovitis.  Check nuclear scan to quantify extent of synovitis before considering further therapy.  Drug induced immunodeficiency due to use of immunosuppressive drugs. Monitor carefully for infections. Advised to get immediate medical care if any infection. Also advised strict adherence to age appropriate vaccinations and cancer screenings with PCP.  Severe lumbar pain with fibromyalgia.   Restart Lyrica.  I have explained all of the above in detail and the patient understands all of the current recommendation(s). I have answered all questions to the best of my ability and to their complete satisfaction.       # Follow up in about 3 months (around 8/20/2025).      Disclaimer: This note was prepared using voice recognition system and is likely to have sound alike errors and is not proof read.  Please call me with any questions.

## 2025-05-20 NOTE — PROGRESS NOTES
X-ray does show mild osteoarthritis of lower lumbar spine which was tender on exam today.  I would recommend considering epidural steroid injection with pain specialist.  Dr. JAMISON

## 2025-05-22 ENCOUNTER — RESULTS FOLLOW-UP (OUTPATIENT)
Dept: RHEUMATOLOGY | Facility: CLINIC | Age: 50
End: 2025-05-22

## 2025-05-22 LAB — W MYELOPEROXIDASE (MPO) AB: 4.6 U/ML

## 2025-05-30 ENCOUNTER — TELEPHONE (OUTPATIENT)
Dept: RHEUMATOLOGY | Facility: CLINIC | Age: 50
End: 2025-05-30
Payer: MEDICARE

## 2025-05-30 NOTE — TELEPHONE ENCOUNTER
----- Message from Dalia sent at 5/30/2025 12:38 PM CDT -----  States she would like to know, what the NM Joint Scan is for. States she didn't know anything about it, until she saw it on her Patient Portal. Please call pt [phone]. Thank you

## 2025-06-06 ENCOUNTER — HOSPITAL ENCOUNTER (OUTPATIENT)
Dept: RADIOLOGY | Facility: HOSPITAL | Age: 50
Discharge: HOME | End: 2025-06-06
Attending: INTERNAL MEDICINE
Payer: MEDICARE

## 2025-06-06 DIAGNOSIS — M31.7 MICROSCOPIC POLYANGIITIS: ICD-10-CM

## 2025-06-06 PROCEDURE — A9512 TC99M PERTECHNETATE: HCPCS | Performed by: INTERNAL MEDICINE

## 2025-06-06 PROCEDURE — 78306 BONE IMAGING WHOLE BODY: CPT | Mod: TC

## 2025-06-06 PROCEDURE — 78306 BONE IMAGING WHOLE BODY: CPT | Mod: 26,,, | Performed by: RADIOLOGY

## 2025-06-06 RX ADMIN — TECHNETIUM TC-99M 15.9 MILLICURIE: 14 INJECTION, SOLUTION INTRAVENOUS at 12:06

## 2025-06-09 NOTE — PROGRESS NOTES
Glad to report that the nuclear joint scan shows no evidence of active inflammation in the joints.

## 2025-07-15 DIAGNOSIS — M79.7 FIBROMYALGIA: ICD-10-CM

## 2025-07-16 DIAGNOSIS — M79.7 FIBROMYALGIA: ICD-10-CM

## 2025-07-16 RX ORDER — CYCLOBENZAPRINE HCL 10 MG
10 TABLET ORAL
Qty: 90 TABLET | Refills: 11 | Status: SHIPPED | OUTPATIENT
Start: 2025-07-16

## 2025-07-17 RX ORDER — CYCLOBENZAPRINE HCL 10 MG
10 TABLET ORAL
Qty: 90 TABLET | Refills: 11 | OUTPATIENT
Start: 2025-07-17

## 2025-08-21 ENCOUNTER — TELEPHONE (OUTPATIENT)
Dept: RHEUMATOLOGY | Facility: CLINIC | Age: 50
End: 2025-08-21
Payer: MEDICARE

## 2025-08-21 ENCOUNTER — LAB VISIT (OUTPATIENT)
Dept: LAB | Facility: HOSPITAL | Age: 50
End: 2025-08-21
Attending: INTERNAL MEDICINE
Payer: MEDICARE

## 2025-08-21 DIAGNOSIS — M31.7 MICROSCOPIC POLYANGIITIS: ICD-10-CM

## 2025-08-21 LAB
ABSOLUTE EOSINOPHIL (OHS): 0.12 K/UL
ABSOLUTE MONOCYTE (OHS): 0.84 K/UL (ref 0.3–1)
ABSOLUTE NEUTROPHIL COUNT (OHS): 2.8 K/UL (ref 1.8–7.7)
ALBUMIN SERPL BCP-MCNC: 3.6 G/DL (ref 3.5–5.2)
ALP SERPL-CCNC: 73 UNIT/L (ref 40–150)
ALT SERPL W/O P-5'-P-CCNC: 15 UNIT/L (ref 10–44)
ANION GAP (OHS): 15 MMOL/L (ref 8–16)
AST SERPL-CCNC: 16 UNIT/L (ref 11–45)
BASOPHILS # BLD AUTO: 0.03 K/UL
BASOPHILS NFR BLD AUTO: 0.5 %
BILIRUB SERPL-MCNC: 0.4 MG/DL (ref 0.1–1)
BUN SERPL-MCNC: 11 MG/DL (ref 6–20)
CALCIUM SERPL-MCNC: 9.3 MG/DL (ref 8.7–10.5)
CHLORIDE SERPL-SCNC: 98 MMOL/L (ref 95–110)
CO2 SERPL-SCNC: 30 MMOL/L (ref 23–29)
CREAT SERPL-MCNC: 1 MG/DL (ref 0.5–1.4)
CRP SERPL-MCNC: 36.6 MG/L
ERYTHROCYTE [DISTWIDTH] IN BLOOD BY AUTOMATED COUNT: 13.2 % (ref 11.5–14.5)
ERYTHROCYTE [SEDIMENTATION RATE] IN BLOOD: 72 MM/HR
GFR SERPLBLD CREATININE-BSD FMLA CKD-EPI: >60 ML/MIN/1.73/M2
GLUCOSE SERPL-MCNC: 103 MG/DL (ref 70–110)
HCT VFR BLD AUTO: 37.5 % (ref 37–48.5)
HGB BLD-MCNC: 12 GM/DL (ref 12–16)
IMM GRANULOCYTES # BLD AUTO: 0.01 K/UL (ref 0–0.04)
IMM GRANULOCYTES NFR BLD AUTO: 0.2 % (ref 0–0.5)
LYMPHOCYTES # BLD AUTO: 1.93 K/UL (ref 1–4.8)
MCH RBC QN AUTO: 28.8 PG (ref 27–31)
MCHC RBC AUTO-ENTMCNC: 32 G/DL (ref 32–36)
MCV RBC AUTO: 90 FL (ref 82–98)
NUCLEATED RBC (/100WBC) (OHS): 0 /100 WBC
PLATELET # BLD AUTO: 295 K/UL (ref 150–450)
PMV BLD AUTO: 10.5 FL (ref 9.2–12.9)
POTASSIUM SERPL-SCNC: 2.6 MMOL/L (ref 3.5–5.1)
PROT SERPL-MCNC: 7.1 GM/DL (ref 6–8.4)
RBC # BLD AUTO: 4.17 M/UL (ref 4–5.4)
RELATIVE EOSINOPHIL (OHS): 2.1 %
RELATIVE LYMPHOCYTE (OHS): 33.7 % (ref 18–48)
RELATIVE MONOCYTE (OHS): 14.7 % (ref 4–15)
RELATIVE NEUTROPHIL (OHS): 48.8 % (ref 38–73)
SODIUM SERPL-SCNC: 143 MMOL/L (ref 136–145)
WBC # BLD AUTO: 5.73 K/UL (ref 3.9–12.7)

## 2025-08-21 PROCEDURE — 85652 RBC SED RATE AUTOMATED: CPT

## 2025-08-21 PROCEDURE — 86140 C-REACTIVE PROTEIN: CPT

## 2025-08-21 PROCEDURE — 36415 COLL VENOUS BLD VENIPUNCTURE: CPT

## 2025-08-21 PROCEDURE — 85025 COMPLETE CBC W/AUTO DIFF WBC: CPT

## 2025-08-21 PROCEDURE — 80053 COMPREHEN METABOLIC PANEL: CPT

## 2025-08-21 PROCEDURE — 83516 IMMUNOASSAY NONANTIBODY: CPT

## 2025-08-25 LAB — W MYELOPEROXIDASE (MPO) AB: 4.9 U/ML

## 2025-08-28 ENCOUNTER — OFFICE VISIT (OUTPATIENT)
Dept: RHEUMATOLOGY | Facility: CLINIC | Age: 50
End: 2025-08-28
Payer: MEDICARE

## 2025-08-28 ENCOUNTER — PATIENT MESSAGE (OUTPATIENT)
Dept: RHEUMATOLOGY | Facility: CLINIC | Age: 50
End: 2025-08-28

## 2025-08-28 ENCOUNTER — TELEPHONE (OUTPATIENT)
Dept: RHEUMATOLOGY | Facility: CLINIC | Age: 50
End: 2025-08-28
Payer: MEDICARE

## 2025-08-28 DIAGNOSIS — D68.61 ANTIPHOSPHOLIPID ANTIBODY WITH HYPERCOAGULABLE STATE: ICD-10-CM

## 2025-08-28 DIAGNOSIS — Z79.899 DRUG-INDUCED IMMUNODEFICIENCY: ICD-10-CM

## 2025-08-28 DIAGNOSIS — M31.7 MICROSCOPIC POLYANGIITIS: Primary | ICD-10-CM

## 2025-08-28 DIAGNOSIS — M54.50 CHRONIC MIDLINE LOW BACK PAIN WITHOUT SCIATICA: ICD-10-CM

## 2025-08-28 DIAGNOSIS — Z51.81 ENCOUNTER FOR MEDICATION MONITORING: ICD-10-CM

## 2025-08-28 DIAGNOSIS — D84.821 DRUG-INDUCED IMMUNODEFICIENCY: ICD-10-CM

## 2025-08-28 DIAGNOSIS — M06.09 RHEUMATOID ARTHRITIS OF MULTIPLE SITES WITH NEGATIVE RHEUMATOID FACTOR: ICD-10-CM

## 2025-08-28 DIAGNOSIS — E87.6 HYPOKALEMIA: ICD-10-CM

## 2025-08-28 DIAGNOSIS — G89.29 CHRONIC MIDLINE LOW BACK PAIN WITHOUT SCIATICA: ICD-10-CM

## 2025-08-28 DIAGNOSIS — M79.7 FIBROMYALGIA: ICD-10-CM

## 2025-08-28 RX ORDER — DIPHENHYDRAMINE HYDROCHLORIDE 50 MG/ML
50 INJECTION, SOLUTION INTRAMUSCULAR; INTRAVENOUS ONCE AS NEEDED
OUTPATIENT
Start: 2025-08-28 | End: 2037-01-24

## 2025-08-28 RX ORDER — ACETAMINOPHEN 325 MG/1
650 TABLET ORAL
OUTPATIENT
Start: 2025-08-28 | End: 2025-08-28

## 2025-08-28 RX ORDER — METHOTREXATE 2.5 MG/1
10 TABLET ORAL
Qty: 16 TABLET | Refills: 2 | Status: SHIPPED | OUTPATIENT
Start: 2025-08-28

## 2025-08-28 RX ORDER — FAMOTIDINE 10 MG/ML
20 INJECTION, SOLUTION INTRAVENOUS
OUTPATIENT
Start: 2025-08-28 | End: 2025-08-28

## 2025-08-28 RX ORDER — EPINEPHRINE 0.3 MG/.3ML
0.3 INJECTION SUBCUTANEOUS ONCE AS NEEDED
OUTPATIENT
Start: 2025-08-28 | End: 2037-01-24

## 2025-08-28 RX ORDER — MEPERIDINE HYDROCHLORIDE 50 MG/ML
25 INJECTION INTRAMUSCULAR; INTRAVENOUS; SUBCUTANEOUS
OUTPATIENT
Start: 2025-08-28

## 2025-08-28 RX ORDER — SODIUM CHLORIDE 0.9 % (FLUSH) 0.9 %
10 SYRINGE (ML) INJECTION
OUTPATIENT
Start: 2025-08-28

## 2025-08-28 RX ORDER — METHYLPREDNISOLONE SOD SUCC 125 MG
100 VIAL (EA) INJECTION
OUTPATIENT
Start: 2025-08-28 | End: 2025-08-28

## 2025-08-28 RX ORDER — FOLIC ACID 1 MG/1
1 TABLET ORAL DAILY
Qty: 90 TABLET | Refills: 2 | Status: SHIPPED | OUTPATIENT
Start: 2025-08-28

## 2025-08-28 RX ORDER — HEPARIN 100 UNIT/ML
500 SYRINGE INTRAVENOUS
OUTPATIENT
Start: 2025-08-28